# Patient Record
Sex: FEMALE | Race: BLACK OR AFRICAN AMERICAN | NOT HISPANIC OR LATINO | Employment: UNEMPLOYED | URBAN - METROPOLITAN AREA
[De-identification: names, ages, dates, MRNs, and addresses within clinical notes are randomized per-mention and may not be internally consistent; named-entity substitution may affect disease eponyms.]

---

## 2020-02-13 ENCOUNTER — HISTORICAL (OUTPATIENT)
Dept: ENDOSCOPY | Facility: HOSPITAL | Age: 75
End: 2020-02-13

## 2020-02-20 ENCOUNTER — HISTORICAL (OUTPATIENT)
Dept: ADMINISTRATIVE | Facility: HOSPITAL | Age: 75
End: 2020-02-20

## 2020-02-20 LAB
ABS NEUT (OLG): 5.57 X10(3)/MCL (ref 2.1–9.2)
ALBUMIN SERPL-MCNC: 3.2 GM/DL (ref 3.4–5)
ALBUMIN/GLOB SERPL: 0.8 {RATIO}
ALP SERPL-CCNC: 260 UNIT/L (ref 38–126)
ALT SERPL-CCNC: 422 UNIT/L (ref 12–78)
AST SERPL-CCNC: 224 UNIT/L (ref 15–37)
BASOPHILS # BLD AUTO: 0 X10(3)/MCL (ref 0–0.2)
BASOPHILS NFR BLD AUTO: 0 %
BILIRUB SERPL-MCNC: 15.2 MG/DL (ref 0.2–1)
BILIRUBIN DIRECT+TOT PNL SERPL-MCNC: 11.9 MG/DL (ref 0–0.2)
BILIRUBIN DIRECT+TOT PNL SERPL-MCNC: 3.3 MG/DL (ref 0–0.8)
BUN SERPL-MCNC: 9 MG/DL (ref 7–18)
CALCIUM SERPL-MCNC: 9.7 MG/DL (ref 8.5–10.1)
CANCER AG19-9 SERPL-ACNC: 9.6 IU/ML (ref 0–35)
CHLORIDE SERPL-SCNC: 104 MMOL/L (ref 98–107)
CO2 SERPL-SCNC: 27 MMOL/L (ref 21–32)
CREAT SERPL-MCNC: 0.73 MG/DL (ref 0.55–1.02)
EOSINOPHIL # BLD AUTO: 0 X10(3)/MCL (ref 0–0.9)
EOSINOPHIL NFR BLD AUTO: 0 %
ERYTHROCYTE [DISTWIDTH] IN BLOOD BY AUTOMATED COUNT: 17.4 % (ref 11.5–17)
GLOBULIN SER-MCNC: 4.2 GM/DL (ref 2.4–3.5)
GLUCOSE SERPL-MCNC: 172 MG/DL (ref 74–106)
HCT VFR BLD AUTO: 41.7 % (ref 37–47)
HGB BLD-MCNC: 13.1 GM/DL (ref 12–16)
INR PPP: 1.1 (ref 0–1.3)
LYMPHOCYTES # BLD AUTO: 2 X10(3)/MCL (ref 0.6–4.6)
LYMPHOCYTES NFR BLD AUTO: 24 %
MCH RBC QN AUTO: 27.3 PG (ref 27–31)
MCHC RBC AUTO-ENTMCNC: 31.4 GM/DL (ref 33–36)
MCV RBC AUTO: 86.9 FL (ref 80–94)
MONOCYTES # BLD AUTO: 0.4 X10(3)/MCL (ref 0.1–1.3)
MONOCYTES NFR BLD AUTO: 5 %
NEUTROPHILS # BLD AUTO: 5.57 X10(3)/MCL (ref 2.1–9.2)
NEUTROPHILS NFR BLD AUTO: 69 %
PLATELET # BLD AUTO: 278 X10(3)/MCL (ref 130–400)
PMV BLD AUTO: 10.3 FL (ref 9.4–12.4)
POTASSIUM SERPL-SCNC: 3.3 MMOL/L (ref 3.5–5.1)
PROT SERPL-MCNC: 7.4 GM/DL (ref 6.4–8.2)
PROTHROMBIN TIME: 13.9 SECOND(S) (ref 11.1–13.7)
RBC # BLD AUTO: 4.8 X10(6)/MCL (ref 4.2–5.4)
SODIUM SERPL-SCNC: 139 MMOL/L (ref 136–145)
WBC # SPEC AUTO: 8.1 X10(3)/MCL (ref 4.5–11.5)

## 2020-03-04 ENCOUNTER — HISTORICAL (OUTPATIENT)
Dept: RADIOLOGY | Facility: HOSPITAL | Age: 75
End: 2020-03-04

## 2020-03-05 ENCOUNTER — HISTORICAL (OUTPATIENT)
Dept: ADMINISTRATIVE | Facility: HOSPITAL | Age: 75
End: 2020-03-05

## 2020-03-05 LAB
ALBUMIN SERPL-MCNC: 2.9 GM/DL (ref 3.4–5)
ALBUMIN/GLOB SERPL: 0.7 RATIO (ref 1.1–2)
ALP SERPL-CCNC: 782 UNIT/L (ref 38–126)
ALT SERPL-CCNC: 763 UNIT/L (ref 12–78)
AST SERPL-CCNC: 521 UNIT/L (ref 15–37)
BILIRUB SERPL-MCNC: 2.8 MG/DL (ref 0.2–1)
BILIRUBIN DIRECT+TOT PNL SERPL-MCNC: 0.3 MG/DL (ref 0–0.8)
BILIRUBIN DIRECT+TOT PNL SERPL-MCNC: 2.5 MG/DL (ref 0–0.5)
BUN SERPL-MCNC: 15 MG/DL (ref 7–18)
CALCIUM SERPL-MCNC: 9 MG/DL (ref 8.5–10.1)
CHLORIDE SERPL-SCNC: 102 MMOL/L (ref 98–107)
CO2 SERPL-SCNC: 29 MMOL/L (ref 21–32)
CREAT SERPL-MCNC: 0.75 MG/DL (ref 0.55–1.02)
GLOBULIN SER-MCNC: 3.9 GM/DL (ref 2.4–3.5)
GLUCOSE SERPL-MCNC: 392 MG/DL (ref 74–106)
POTASSIUM SERPL-SCNC: 4.2 MMOL/L (ref 3.5–5.1)
PROT SERPL-MCNC: 6.8 GM/DL (ref 6.4–8.2)
SODIUM SERPL-SCNC: 139 MMOL/L (ref 136–145)

## 2020-03-06 ENCOUNTER — HISTORICAL (OUTPATIENT)
Dept: RADIOLOGY | Facility: HOSPITAL | Age: 75
End: 2020-03-06

## 2020-03-11 ENCOUNTER — HISTORICAL (OUTPATIENT)
Dept: ADMINISTRATIVE | Facility: HOSPITAL | Age: 75
End: 2020-03-11

## 2020-03-11 LAB
ABS NEUT (OLG): 3.12 X10(3)/MCL (ref 2.1–9.2)
ALBUMIN SERPL-MCNC: 2.8 GM/DL (ref 3.4–5)
ALBUMIN/GLOB SERPL: 0.7 RATIO (ref 1.1–2)
ALP SERPL-CCNC: 408 UNIT/L (ref 38–126)
ALT SERPL-CCNC: 197 UNIT/L (ref 12–78)
AST SERPL-CCNC: 52 UNIT/L (ref 15–37)
BASOPHILS # BLD AUTO: 0 X10(3)/MCL (ref 0–0.2)
BASOPHILS NFR BLD AUTO: 0.3 %
BILIRUB SERPL-MCNC: 1.8 MG/DL (ref 0.2–1)
BILIRUBIN DIRECT+TOT PNL SERPL-MCNC: 0.1 MG/DL (ref 0–0.8)
BILIRUBIN DIRECT+TOT PNL SERPL-MCNC: 1.7 MG/DL (ref 0–0.5)
BUN SERPL-MCNC: 11 MG/DL (ref 7–18)
CALCIUM SERPL-MCNC: 9 MG/DL (ref 8.5–10.1)
CHLORIDE SERPL-SCNC: 104 MMOL/L (ref 98–107)
CO2 SERPL-SCNC: 30 MMOL/L (ref 21–32)
CREAT SERPL-MCNC: 0.66 MG/DL (ref 0.55–1.02)
EOSINOPHIL # BLD AUTO: 0.1 X10(3)/MCL (ref 0–0.9)
EOSINOPHIL NFR BLD AUTO: 1 %
ERYTHROCYTE [DISTWIDTH] IN BLOOD BY AUTOMATED COUNT: 14.8 % (ref 11.5–17)
GLOBULIN SER-MCNC: 3.9 GM/DL (ref 2.4–3.5)
GLUCOSE SERPL-MCNC: 245 MG/DL (ref 74–106)
HCT VFR BLD AUTO: 36.7 % (ref 37–47)
HGB BLD-MCNC: 11.4 GM/DL (ref 12–16)
LYMPHOCYTES # BLD AUTO: 2.7 X10(3)/MCL (ref 0.6–4.6)
LYMPHOCYTES NFR BLD AUTO: 42.6 %
MCH RBC QN AUTO: 27.7 PG (ref 27–31)
MCHC RBC AUTO-ENTMCNC: 31.1 GM/DL (ref 33–36)
MCV RBC AUTO: 89.3 FL (ref 80–94)
MONOCYTES # BLD AUTO: 0.4 X10(3)/MCL (ref 0.1–1.3)
MONOCYTES NFR BLD AUTO: 6.4 %
NEUTROPHILS # BLD AUTO: 3.1 X10(3)/MCL (ref 2.1–9.2)
NEUTROPHILS NFR BLD AUTO: 49.5 %
PLATELET # BLD AUTO: 249 X10(3)/MCL (ref 130–400)
PMV BLD AUTO: 9.1 FL (ref 9.4–12.4)
POTASSIUM SERPL-SCNC: 3.7 MMOL/L (ref 3.5–5.1)
PROT SERPL-MCNC: 6.7 GM/DL (ref 6.4–8.2)
RBC # BLD AUTO: 4.11 X10(6)/MCL (ref 4.2–5.4)
SODIUM SERPL-SCNC: 139 MMOL/L (ref 136–145)
WBC # SPEC AUTO: 6.3 X10(3)/MCL (ref 4.5–11.5)

## 2020-03-12 ENCOUNTER — HISTORICAL (OUTPATIENT)
Dept: INFUSION THERAPY | Facility: HOSPITAL | Age: 75
End: 2020-03-12

## 2020-03-12 ENCOUNTER — HISTORICAL (OUTPATIENT)
Dept: ADMINISTRATIVE | Facility: HOSPITAL | Age: 75
End: 2020-03-12

## 2020-03-18 ENCOUNTER — HISTORICAL (OUTPATIENT)
Dept: ADMINISTRATIVE | Facility: HOSPITAL | Age: 75
End: 2020-03-18

## 2020-03-18 LAB
ABS NEUT (OLG): 10.74 X10(3)/MCL (ref 2.1–9.2)
ALBUMIN SERPL-MCNC: 3 GM/DL (ref 3.4–5)
ALBUMIN/GLOB SERPL: 0.8 RATIO (ref 1.1–2)
ALP SERPL-CCNC: 284 UNIT/L (ref 38–126)
ALT SERPL-CCNC: 116 UNIT/L (ref 12–78)
AST SERPL-CCNC: 27 UNIT/L (ref 15–37)
BASOPHILS # BLD AUTO: 0 X10(3)/MCL (ref 0–0.2)
BASOPHILS NFR BLD AUTO: 0.1 %
BILIRUB SERPL-MCNC: 1.2 MG/DL (ref 0.2–1)
BILIRUBIN DIRECT+TOT PNL SERPL-MCNC: 0.2 MG/DL (ref 0–0.8)
BILIRUBIN DIRECT+TOT PNL SERPL-MCNC: 1 MG/DL (ref 0–0.5)
BUN SERPL-MCNC: 10 MG/DL (ref 7–18)
CALCIUM SERPL-MCNC: 9.2 MG/DL (ref 8.5–10.1)
CHLORIDE SERPL-SCNC: 100 MMOL/L (ref 98–107)
CO2 SERPL-SCNC: 30 MMOL/L (ref 21–32)
CREAT SERPL-MCNC: 0.75 MG/DL (ref 0.55–1.02)
EOSINOPHIL # BLD AUTO: 0 X10(3)/MCL (ref 0–0.9)
EOSINOPHIL NFR BLD AUTO: 0.1 %
ERYTHROCYTE [DISTWIDTH] IN BLOOD BY AUTOMATED COUNT: 14.2 % (ref 11.5–17)
GLOBULIN SER-MCNC: 3.7 GM/DL (ref 2.4–3.5)
GLUCOSE SERPL-MCNC: 296 MG/DL (ref 74–106)
HCT VFR BLD AUTO: 36.8 % (ref 37–47)
HGB BLD-MCNC: 11.5 GM/DL (ref 12–16)
LYMPHOCYTES # BLD AUTO: 2.8 X10(3)/MCL (ref 0.6–4.6)
LYMPHOCYTES NFR BLD AUTO: 19.6 %
MCH RBC QN AUTO: 27.8 PG (ref 27–31)
MCHC RBC AUTO-ENTMCNC: 31.3 GM/DL (ref 33–36)
MCV RBC AUTO: 89.1 FL (ref 80–94)
MONOCYTES # BLD AUTO: 0.7 X10(3)/MCL (ref 0.1–1.3)
MONOCYTES NFR BLD AUTO: 5 %
NEUTROPHILS # BLD AUTO: 10.7 X10(3)/MCL (ref 2.1–9.2)
NEUTROPHILS NFR BLD AUTO: 74 %
PLATELET # BLD AUTO: 127 X10(3)/MCL (ref 130–400)
PMV BLD AUTO: 9.8 FL (ref 9.4–12.4)
POTASSIUM SERPL-SCNC: 4.1 MMOL/L (ref 3.5–5.1)
PROT SERPL-MCNC: 6.7 GM/DL (ref 6.4–8.2)
RBC # BLD AUTO: 4.13 X10(6)/MCL (ref 4.2–5.4)
SODIUM SERPL-SCNC: 137 MMOL/L (ref 136–145)
WBC # SPEC AUTO: 14.5 X10(3)/MCL (ref 4.5–11.5)

## 2020-03-26 ENCOUNTER — HISTORICAL (OUTPATIENT)
Dept: INFUSION THERAPY | Facility: HOSPITAL | Age: 75
End: 2020-03-26

## 2020-03-26 LAB
ABS NEUT (OLG): 13.15 X10(3)/MCL (ref 2.1–9.2)
ANION GAP SERPL CALC-SCNC: 17 MMOL/L
BASOPHILS # BLD AUTO: 0 X10(3)/MCL (ref 0–0.2)
BASOPHILS NFR BLD AUTO: 0.2 %
BUN SERPL-MCNC: 8 MG/DL (ref 8–26)
CHLORIDE SERPL-SCNC: 100 MMOL/L (ref 98–109)
CREAT SERPL-MCNC: 0.5 MG/DL (ref 0.6–1.3)
EOSINOPHIL # BLD AUTO: 0.2 X10(3)/MCL (ref 0–0.9)
EOSINOPHIL NFR BLD AUTO: 0.9 %
ERYTHROCYTE [DISTWIDTH] IN BLOOD BY AUTOMATED COUNT: 14.8 % (ref 11.5–17)
GLUCOSE SERPL-MCNC: 280 MG/DL (ref 70–105)
HCT VFR BLD AUTO: 37.3 % (ref 37–47)
HCT VFR BLD CALC: 37 % (ref 38–51)
HGB BLD-MCNC: 11.5 GM/DL (ref 12–16)
HGB BLD-MCNC: 12.6 MG/DL (ref 12–17)
LYMPHOCYTES # BLD AUTO: 4.3 X10(3)/MCL (ref 0.6–4.6)
LYMPHOCYTES NFR BLD AUTO: 23.2 %
MCH RBC QN AUTO: 27.3 PG (ref 27–31)
MCHC RBC AUTO-ENTMCNC: 30.8 GM/DL (ref 33–36)
MCV RBC AUTO: 88.6 FL (ref 80–94)
MONOCYTES # BLD AUTO: 0.8 X10(3)/MCL (ref 0.1–1.3)
MONOCYTES NFR BLD AUTO: 4.1 %
NEUTROPHILS # BLD AUTO: 13.2 X10(3)/MCL (ref 2.1–9.2)
NEUTROPHILS NFR BLD AUTO: 71 %
PLATELET # BLD AUTO: 181 X10(3)/MCL (ref 130–400)
PMV BLD AUTO: 9.1 FL (ref 9.4–12.4)
POC IONIZED CALCIUM: 1.19 MMOL/L (ref 1.12–1.32)
POC TCO2: 26 MMOL/L (ref 24–29)
POTASSIUM BLD-SCNC: 4 MMOL/L (ref 3.5–4.9)
RBC # BLD AUTO: 4.21 X10(6)/MCL (ref 4.2–5.4)
SODIUM BLD-SCNC: 138 MMOL/L (ref 138–146)
WBC # SPEC AUTO: 18.5 X10(3)/MCL (ref 4.5–11.5)

## 2020-03-27 ENCOUNTER — HISTORICAL (OUTPATIENT)
Dept: INFUSION THERAPY | Facility: HOSPITAL | Age: 75
End: 2020-03-27

## 2020-04-01 ENCOUNTER — HISTORICAL (OUTPATIENT)
Dept: ADMINISTRATIVE | Facility: HOSPITAL | Age: 75
End: 2020-04-01

## 2020-04-01 LAB
ABS NEUT (OLG): 27.55 X10(3)/MCL (ref 2.1–9.2)
ALBUMIN SERPL-MCNC: 2.9 GM/DL (ref 3.4–4.8)
ALBUMIN/GLOB SERPL: 0.9 RATIO (ref 1.1–2)
ALP SERPL-CCNC: 332 UNIT/L (ref 40–150)
ALT SERPL-CCNC: 101 UNIT/L (ref 0–55)
AST SERPL-CCNC: 53 UNIT/L (ref 5–34)
BASOPHILS # BLD AUTO: 0 X10(3)/MCL (ref 0–0.2)
BASOPHILS NFR BLD AUTO: 0 %
BILIRUB SERPL-MCNC: 2.6 MG/DL
BILIRUBIN DIRECT+TOT PNL SERPL-MCNC: 0.4 MG/DL (ref 0–0.8)
BILIRUBIN DIRECT+TOT PNL SERPL-MCNC: 2.2 MG/DL (ref 0–0.5)
BUN SERPL-MCNC: 12 MG/DL (ref 9.8–20.1)
CALCIUM SERPL-MCNC: 8.4 MG/DL (ref 8.4–10.2)
CHLORIDE SERPL-SCNC: 101 MMOL/L (ref 98–107)
CO2 SERPL-SCNC: 26 MMOL/L (ref 23–31)
CREAT SERPL-MCNC: 0.73 MG/DL (ref 0.55–1.02)
EOSINOPHIL # BLD AUTO: 0 X10(3)/MCL (ref 0–0.9)
EOSINOPHIL NFR BLD AUTO: 0.2 %
ERYTHROCYTE [DISTWIDTH] IN BLOOD BY AUTOMATED COUNT: 15 % (ref 11.5–17)
GLOBULIN SER-MCNC: 3.2 GM/DL (ref 2.4–3.5)
GLUCOSE SERPL-MCNC: 270 MG/DL (ref 82–115)
HCT VFR BLD AUTO: 35.9 % (ref 37–47)
HGB BLD-MCNC: 11.2 GM/DL (ref 12–16)
LYMPHOCYTES # BLD AUTO: 2.4 X10(3)/MCL (ref 0.6–4.6)
LYMPHOCYTES NFR BLD AUTO: 7.7 %
MCH RBC QN AUTO: 27.4 PG (ref 27–31)
MCHC RBC AUTO-ENTMCNC: 31.2 GM/DL (ref 33–36)
MCV RBC AUTO: 87.8 FL (ref 80–94)
MONOCYTES # BLD AUTO: 1.1 X10(3)/MCL (ref 0.1–1.3)
MONOCYTES NFR BLD AUTO: 3.5 %
NEUTROPHILS # BLD AUTO: 27.6 X10(3)/MCL (ref 2.1–9.2)
NEUTROPHILS NFR BLD AUTO: 87.2 %
PLATELET # BLD AUTO: 129 X10(3)/MCL (ref 130–400)
PMV BLD AUTO: 9.2 FL (ref 9.4–12.4)
POTASSIUM SERPL-SCNC: 3.4 MMOL/L (ref 3.5–5.1)
PROT SERPL-MCNC: 6.1 GM/DL (ref 5.8–7.6)
RBC # BLD AUTO: 4.09 X10(6)/MCL (ref 4.2–5.4)
SODIUM SERPL-SCNC: 136 MMOL/L (ref 136–145)
WBC # SPEC AUTO: 31.6 X10(3)/MCL (ref 4.5–11.5)

## 2020-04-09 ENCOUNTER — HISTORICAL (OUTPATIENT)
Dept: INFUSION THERAPY | Facility: HOSPITAL | Age: 75
End: 2020-04-09

## 2020-04-09 LAB
ABS NEUT (OLG): 18.19 X10(3)/MCL (ref 2.1–9.2)
ALBUMIN SERPL-MCNC: 3.2 GM/DL (ref 3.4–5)
ALP SERPL-CCNC: 354 UNIT/L (ref 40–150)
ALT SERPL-CCNC: 78 UNIT/L (ref 0–55)
ANION GAP SERPL CALC-SCNC: 14 MMOL/L
AST SERPL-CCNC: 46 UNIT/L (ref 5–34)
BASOPHILS # BLD AUTO: 0 X10(3)/MCL (ref 0–0.2)
BASOPHILS NFR BLD AUTO: 0.2 %
BILIRUB SERPL-MCNC: 1.1 MG/DL
BILIRUBIN DIRECT+TOT PNL SERPL-MCNC: 0.4 MG/DL (ref 0–0.8)
BILIRUBIN DIRECT+TOT PNL SERPL-MCNC: 0.7 MG/DL (ref 0–0.5)
BUN SERPL-MCNC: 8 MG/DL (ref 8–26)
CHLORIDE SERPL-SCNC: 102 MMOL/L (ref 98–109)
CREAT SERPL-MCNC: 0.6 MG/DL (ref 0.6–1.3)
EOSINOPHIL # BLD AUTO: 0.2 X10(3)/MCL (ref 0–0.9)
EOSINOPHIL NFR BLD AUTO: 0.6 %
ERYTHROCYTE [DISTWIDTH] IN BLOOD BY AUTOMATED COUNT: 16 % (ref 11.5–17)
GLUCOSE SERPL-MCNC: 229 MG/DL (ref 70–105)
HCT VFR BLD AUTO: 38.2 % (ref 37–47)
HCT VFR BLD CALC: 37 % (ref 38–51)
HGB BLD-MCNC: 11.8 GM/DL (ref 12–16)
HGB BLD-MCNC: 12.6 MG/DL (ref 12–17)
LIVER PROFILE INTERP: ABNORMAL
LYMPHOCYTES # BLD AUTO: 4.4 X10(3)/MCL (ref 0.6–4.6)
LYMPHOCYTES NFR BLD AUTO: 18.3 %
MCH RBC QN AUTO: 27.6 PG (ref 27–31)
MCHC RBC AUTO-ENTMCNC: 30.9 GM/DL (ref 33–36)
MCV RBC AUTO: 89.5 FL (ref 80–94)
MONOCYTES # BLD AUTO: 0.9 X10(3)/MCL (ref 0.1–1.3)
MONOCYTES NFR BLD AUTO: 3.7 %
NEUTROPHILS # BLD AUTO: 18.2 X10(3)/MCL (ref 2.1–9.2)
NEUTROPHILS NFR BLD AUTO: 76.6 %
PLATELET # BLD AUTO: 266 X10(3)/MCL (ref 130–400)
PMV BLD AUTO: 9.1 FL (ref 9.4–12.4)
POC IONIZED CALCIUM: 1.16 MMOL/L (ref 1.12–1.32)
POC TCO2: 27 MMOL/L (ref 24–29)
POTASSIUM BLD-SCNC: 3.7 MMOL/L (ref 3.5–4.9)
PROT SERPL-MCNC: 7 GM/DL (ref 5.8–7.6)
RBC # BLD AUTO: 4.27 X10(6)/MCL (ref 4.2–5.4)
SODIUM BLD-SCNC: 139 MMOL/L (ref 138–146)
WBC # SPEC AUTO: 23.8 X10(3)/MCL (ref 4.5–11.5)

## 2020-04-23 ENCOUNTER — HISTORICAL (OUTPATIENT)
Dept: ADMINISTRATIVE | Facility: HOSPITAL | Age: 75
End: 2020-04-23

## 2020-04-23 LAB
ABS NEUT (OLG): 8.93 X10(3)/MCL (ref 2.1–9.2)
ALBUMIN SERPL-MCNC: 2.7 GM/DL (ref 3.4–4.8)
ALP SERPL-CCNC: 337 UNIT/L (ref 40–150)
ALT SERPL-CCNC: 64 UNIT/L (ref 0–55)
ANION GAP SERPL CALC-SCNC: 16 MMOL/L
AST SERPL-CCNC: 56 UNIT/L (ref 5–34)
BASOPHILS # BLD AUTO: 0 X10(3)/MCL (ref 0–0.2)
BASOPHILS NFR BLD AUTO: 0.3 %
BILIRUB SERPL-MCNC: 1.8 MG/DL
BILIRUBIN DIRECT+TOT PNL SERPL-MCNC: 0.7 MG/DL (ref 0–0.8)
BILIRUBIN DIRECT+TOT PNL SERPL-MCNC: 1.1 MG/DL (ref 0–0.5)
BUN SERPL-MCNC: 9 MG/DL (ref 8–26)
CHLORIDE SERPL-SCNC: 99 MMOL/L (ref 98–109)
CREAT SERPL-MCNC: 0.5 MG/DL (ref 0.6–1.3)
EOSINOPHIL # BLD AUTO: 0 X10(3)/MCL (ref 0–0.9)
EOSINOPHIL NFR BLD AUTO: 0.4 %
ERYTHROCYTE [DISTWIDTH] IN BLOOD BY AUTOMATED COUNT: 16.9 % (ref 11.5–17)
GLUCOSE SERPL-MCNC: 195 MG/DL (ref 70–105)
HCT VFR BLD AUTO: 31.7 % (ref 37–47)
HCT VFR BLD CALC: 33 % (ref 38–51)
HGB BLD-MCNC: 11.2 MG/DL (ref 12–17)
HGB BLD-MCNC: 9.9 GM/DL (ref 12–16)
LIVER PROFILE INTERP: ABNORMAL
LYMPHOCYTES # BLD AUTO: 2.2 X10(3)/MCL (ref 0.6–4.6)
LYMPHOCYTES NFR BLD AUTO: 17.7 %
MCH RBC QN AUTO: 28.3 PG (ref 27–31)
MCHC RBC AUTO-ENTMCNC: 31.2 GM/DL (ref 33–36)
MCV RBC AUTO: 90.6 FL (ref 80–94)
MONOCYTES # BLD AUTO: 0.9 X10(3)/MCL (ref 0.1–1.3)
MONOCYTES NFR BLD AUTO: 7.5 %
NEUTROPHILS # BLD AUTO: 8.9 X10(3)/MCL (ref 2.1–9.2)
NEUTROPHILS NFR BLD AUTO: 73.8 %
PLATELET # BLD AUTO: 280 X10(3)/MCL (ref 130–400)
PMV BLD AUTO: 10.4 FL (ref 9.4–12.4)
POC IONIZED CALCIUM: 1.12 MMOL/L (ref 1.12–1.32)
POC TCO2: 26 MMOL/L (ref 24–29)
POTASSIUM BLD-SCNC: 3.4 MMOL/L (ref 3.5–4.9)
PROT SERPL-MCNC: 7.3 GM/DL (ref 5.8–7.6)
RBC # BLD AUTO: 3.5 X10(6)/MCL (ref 4.2–5.4)
SODIUM BLD-SCNC: 137 MMOL/L (ref 138–146)
WBC # SPEC AUTO: 12.1 X10(3)/MCL (ref 4.5–11.5)

## 2020-04-30 ENCOUNTER — HISTORICAL (OUTPATIENT)
Dept: INFUSION THERAPY | Facility: HOSPITAL | Age: 75
End: 2020-04-30

## 2020-04-30 LAB
ABS NEUT (OLG): 5.73 X10(3)/MCL (ref 2.1–9.2)
ALBUMIN SERPL-MCNC: 2.9 GM/DL (ref 3.4–5)
ALP SERPL-CCNC: 312 UNIT/L (ref 40–150)
ALT SERPL-CCNC: 41 UNIT/L (ref 0–55)
ANION GAP SERPL CALC-SCNC: 14 MMOL/L
AST SERPL-CCNC: 41 UNIT/L (ref 5–34)
BASOPHILS # BLD AUTO: 0.1 X10(3)/MCL (ref 0–0.2)
BASOPHILS NFR BLD AUTO: 1.1 %
BILIRUB SERPL-MCNC: 1 MG/DL
BILIRUBIN DIRECT+TOT PNL SERPL-MCNC: 0.3 MG/DL (ref 0–0.8)
BILIRUBIN DIRECT+TOT PNL SERPL-MCNC: 0.7 MG/DL (ref 0–0.5)
BUN SERPL-MCNC: 8 MG/DL (ref 8–26)
CHLORIDE SERPL-SCNC: 102 MMOL/L (ref 98–109)
CREAT SERPL-MCNC: 0.6 MG/DL (ref 0.6–1.3)
EOSINOPHIL # BLD AUTO: 0.1 X10(3)/MCL (ref 0–0.9)
EOSINOPHIL NFR BLD AUTO: 1.4 %
ERYTHROCYTE [DISTWIDTH] IN BLOOD BY AUTOMATED COUNT: 17.4 % (ref 11.5–17)
GLUCOSE SERPL-MCNC: 166 MG/DL (ref 70–105)
HCT VFR BLD AUTO: 31.6 % (ref 37–47)
HCT VFR BLD CALC: 33 % (ref 38–51)
HGB BLD-MCNC: 11.2 MG/DL (ref 12–17)
HGB BLD-MCNC: 9.8 GM/DL (ref 12–16)
LIVER PROFILE INTERP: ABNORMAL
LYMPHOCYTES # BLD AUTO: 2.2 X10(3)/MCL (ref 0.6–4.6)
LYMPHOCYTES NFR BLD AUTO: 25.5 %
MCH RBC QN AUTO: 28.6 PG (ref 27–31)
MCHC RBC AUTO-ENTMCNC: 31 GM/DL (ref 33–36)
MCV RBC AUTO: 92.1 FL (ref 80–94)
MONOCYTES # BLD AUTO: 0.6 X10(3)/MCL (ref 0.1–1.3)
MONOCYTES NFR BLD AUTO: 6.6 %
NEUTROPHILS # BLD AUTO: 5.7 X10(3)/MCL (ref 2.1–9.2)
NEUTROPHILS NFR BLD AUTO: 65.2 %
PLATELET # BLD AUTO: 384 X10(3)/MCL (ref 130–400)
PMV BLD AUTO: 8.9 FL (ref 9.4–12.4)
POC IONIZED CALCIUM: 1.22 MMOL/L (ref 1.12–1.32)
POC TCO2: 27 MMOL/L (ref 24–29)
POTASSIUM BLD-SCNC: 3.5 MMOL/L (ref 3.5–4.9)
PROT SERPL-MCNC: 7.5 GM/DL (ref 5.8–7.6)
RBC # BLD AUTO: 3.43 X10(6)/MCL (ref 4.2–5.4)
SODIUM BLD-SCNC: 139 MMOL/L (ref 138–146)
WBC # SPEC AUTO: 8.8 X10(3)/MCL (ref 4.5–11.5)

## 2020-05-07 ENCOUNTER — HISTORICAL (OUTPATIENT)
Dept: ADMINISTRATIVE | Facility: HOSPITAL | Age: 75
End: 2020-05-07

## 2020-05-07 LAB
ABS NEUT (OLG): 1.74 X10(3)/MCL (ref 2.1–9.2)
ALBUMIN SERPL-MCNC: 2.9 GM/DL (ref 3.4–5)
ALBUMIN/GLOB SERPL: 0.8 RATIO (ref 1.1–2)
ALP SERPL-CCNC: 231 UNIT/L (ref 40–150)
ALT SERPL-CCNC: 40 UNIT/L (ref 0–55)
AST SERPL-CCNC: 32 UNIT/L (ref 5–34)
BASOPHILS # BLD AUTO: 0 X10(3)/MCL (ref 0–0.2)
BASOPHILS NFR BLD AUTO: 0.9 %
BILIRUB SERPL-MCNC: 0.7 MG/DL
BILIRUBIN DIRECT+TOT PNL SERPL-MCNC: 0.2 MG/DL (ref 0–0.8)
BILIRUBIN DIRECT+TOT PNL SERPL-MCNC: 0.5 MG/DL (ref 0–0.5)
BUN SERPL-MCNC: 7 MG/DL (ref 9.8–20.1)
CALCIUM SERPL-MCNC: 8.4 MG/DL (ref 8.4–10.2)
CHLORIDE SERPL-SCNC: 103 MMOL/L (ref 98–107)
CO2 SERPL-SCNC: 28 MMOL/L (ref 23–31)
CREAT SERPL-MCNC: 0.66 MG/DL (ref 0.55–1.02)
EOSINOPHIL # BLD AUTO: 0 X10(3)/MCL (ref 0–0.9)
EOSINOPHIL NFR BLD AUTO: 0.9 %
ERYTHROCYTE [DISTWIDTH] IN BLOOD BY AUTOMATED COUNT: 17 % (ref 11.5–17)
GLOBULIN SER-MCNC: 3.6 GM/DL (ref 2.4–3.5)
GLUCOSE SERPL-MCNC: 161 MG/DL (ref 82–115)
HCT VFR BLD AUTO: 28.7 % (ref 37–47)
HGB BLD-MCNC: 8.8 GM/DL (ref 12–16)
LYMPHOCYTES # BLD AUTO: 2.1 X10(3)/MCL (ref 0.6–4.6)
LYMPHOCYTES NFR BLD AUTO: 48.8 %
MCH RBC QN AUTO: 28.9 PG (ref 27–31)
MCHC RBC AUTO-ENTMCNC: 30.7 GM/DL (ref 33–36)
MCV RBC AUTO: 94.1 FL (ref 80–94)
MONOCYTES # BLD AUTO: 0.4 X10(3)/MCL (ref 0.1–1.3)
MONOCYTES NFR BLD AUTO: 8.6 %
NEUTROPHILS # BLD AUTO: 1.7 X10(3)/MCL (ref 2.1–9.2)
NEUTROPHILS NFR BLD AUTO: 40.6 %
PLATELET # BLD AUTO: 133 X10(3)/MCL (ref 130–400)
PMV BLD AUTO: 9.2 FL (ref 9.4–12.4)
POTASSIUM SERPL-SCNC: 3.9 MMOL/L (ref 3.5–5.1)
PROT SERPL-MCNC: 6.5 GM/DL (ref 5.8–7.6)
RBC # BLD AUTO: 3.05 X10(6)/MCL (ref 4.2–5.4)
SODIUM SERPL-SCNC: 140 MMOL/L (ref 136–145)
WBC # SPEC AUTO: 4.3 X10(3)/MCL (ref 4.5–11.5)

## 2020-05-12 ENCOUNTER — HISTORICAL (OUTPATIENT)
Dept: WOUND CARE | Facility: HOSPITAL | Age: 75
End: 2020-05-12

## 2020-05-21 ENCOUNTER — HISTORICAL (OUTPATIENT)
Dept: ADMINISTRATIVE | Facility: HOSPITAL | Age: 75
End: 2020-05-21

## 2020-05-21 LAB
ABS NEUT (OLG): 5.2 X10(3)/MCL (ref 2.1–9.2)
ALBUMIN SERPL-MCNC: 3.2 GM/DL (ref 3.4–4.8)
ALP SERPL-CCNC: 314 UNIT/L (ref 40–150)
ALT SERPL-CCNC: 49 UNIT/L (ref 0–55)
ANION GAP SERPL CALC-SCNC: 17 MMOL/L
AST SERPL-CCNC: 43 UNIT/L (ref 5–34)
BASOPHILS # BLD AUTO: 0 X10(3)/MCL (ref 0–0.2)
BASOPHILS NFR BLD AUTO: 0.3 %
BILIRUB SERPL-MCNC: 0.6 MG/DL
BILIRUBIN DIRECT+TOT PNL SERPL-MCNC: 0.2 MG/DL (ref 0–0.8)
BILIRUBIN DIRECT+TOT PNL SERPL-MCNC: 0.4 MG/DL (ref 0–0.5)
BUN SERPL-MCNC: 6 MG/DL (ref 8–26)
CHLORIDE SERPL-SCNC: 102 MMOL/L (ref 98–109)
CREAT SERPL-MCNC: 0.4 MG/DL (ref 0.6–1.3)
EOSINOPHIL # BLD AUTO: 0 X10(3)/MCL (ref 0–0.9)
EOSINOPHIL NFR BLD AUTO: 0.6 %
ERYTHROCYTE [DISTWIDTH] IN BLOOD BY AUTOMATED COUNT: 16.8 % (ref 11.5–17)
GLUCOSE SERPL-MCNC: 164 MG/DL (ref 70–105)
HCT VFR BLD AUTO: 32.2 % (ref 37–47)
HCT VFR BLD CALC: 32 % (ref 38–51)
HGB BLD-MCNC: 10.9 MG/DL (ref 12–17)
HGB BLD-MCNC: 9.6 GM/DL (ref 12–16)
LIVER PROFILE INTERP: ABNORMAL
LYMPHOCYTES # BLD AUTO: 2.9 X10(3)/MCL (ref 0.6–4.6)
LYMPHOCYTES NFR BLD AUTO: 33.3 %
MCH RBC QN AUTO: 28.2 PG (ref 27–31)
MCHC RBC AUTO-ENTMCNC: 29.8 GM/DL (ref 33–36)
MCV RBC AUTO: 94.4 FL (ref 80–94)
MONOCYTES # BLD AUTO: 0.5 X10(3)/MCL (ref 0.1–1.3)
MONOCYTES NFR BLD AUTO: 6 %
NEUTROPHILS # BLD AUTO: 5.2 X10(3)/MCL (ref 2.1–9.2)
NEUTROPHILS NFR BLD AUTO: 59.6 %
PLATELET # BLD AUTO: 315 X10(3)/MCL (ref 130–400)
PMV BLD AUTO: 8.9 FL (ref 9.4–12.4)
POC IONIZED CALCIUM: 1.24 MMOL/L (ref 1.12–1.32)
POC TCO2: 27 MMOL/L (ref 24–29)
POTASSIUM BLD-SCNC: 3.7 MMOL/L (ref 3.5–4.9)
PROT SERPL-MCNC: 7.1 GM/DL (ref 5.8–7.6)
RBC # BLD AUTO: 3.41 X10(6)/MCL (ref 4.2–5.4)
SODIUM BLD-SCNC: 140 MMOL/L (ref 138–146)
WBC # SPEC AUTO: 8.7 X10(3)/MCL (ref 4.5–11.5)

## 2020-06-04 ENCOUNTER — HISTORICAL (OUTPATIENT)
Dept: ADMINISTRATIVE | Facility: HOSPITAL | Age: 75
End: 2020-06-04

## 2020-06-04 LAB
ABS NEUT (OLG): 9.28 X10(3)/MCL (ref 2.1–9.2)
ALBUMIN SERPL-MCNC: 3.2 GM/DL (ref 3.4–5)
ALBUMIN/GLOB SERPL: 0.8 RATIO (ref 1.1–2)
ALP SERPL-CCNC: 561 UNIT/L (ref 40–150)
ALT SERPL-CCNC: 157 UNIT/L (ref 0–55)
AST SERPL-CCNC: 164 UNIT/L (ref 5–34)
BASOPHILS # BLD AUTO: 0 X10(3)/MCL (ref 0–0.2)
BASOPHILS NFR BLD AUTO: 0.2 %
BILIRUB SERPL-MCNC: 2.3 MG/DL
BILIRUBIN DIRECT+TOT PNL SERPL-MCNC: 0.6 MG/DL (ref 0–0.8)
BILIRUBIN DIRECT+TOT PNL SERPL-MCNC: 1.7 MG/DL (ref 0–0.5)
BUN SERPL-MCNC: 7.4 MG/DL (ref 9.8–20.1)
CALCIUM SERPL-MCNC: 9.2 MG/DL (ref 8.4–10.2)
CANCER AG19-9 SERPL-ACNC: <2.06 UNIT/ML (ref 0–37)
CHLORIDE SERPL-SCNC: 102 MMOL/L (ref 98–107)
CO2 SERPL-SCNC: 30 MMOL/L (ref 23–31)
CREAT SERPL-MCNC: 0.59 MG/DL (ref 0.55–1.02)
EOSINOPHIL # BLD AUTO: 0 X10(3)/MCL (ref 0–0.9)
EOSINOPHIL NFR BLD AUTO: 0.2 %
ERYTHROCYTE [DISTWIDTH] IN BLOOD BY AUTOMATED COUNT: 16.2 % (ref 11.5–17)
GLOBULIN SER-MCNC: 4 GM/DL (ref 2.4–3.5)
GLUCOSE SERPL-MCNC: 188 MG/DL (ref 82–115)
HCT VFR BLD AUTO: 34.5 % (ref 37–47)
HGB BLD-MCNC: 10.5 GM/DL (ref 12–16)
LYMPHOCYTES # BLD AUTO: 1.8 X10(3)/MCL (ref 0.6–4.6)
LYMPHOCYTES NFR BLD AUTO: 15.2 %
MCH RBC QN AUTO: 28.8 PG (ref 27–31)
MCHC RBC AUTO-ENTMCNC: 30.4 GM/DL (ref 33–36)
MCV RBC AUTO: 94.5 FL (ref 80–94)
MONOCYTES # BLD AUTO: 0.6 X10(3)/MCL (ref 0.1–1.3)
MONOCYTES NFR BLD AUTO: 4.8 %
NEUTROPHILS # BLD AUTO: 9.3 X10(3)/MCL (ref 2.1–9.2)
NEUTROPHILS NFR BLD AUTO: 79.4 %
PLATELET # BLD AUTO: 277 X10(3)/MCL (ref 130–400)
PMV BLD AUTO: 9.3 FL (ref 9.4–12.4)
POTASSIUM SERPL-SCNC: 4 MMOL/L (ref 3.5–5.1)
PROT SERPL-MCNC: 7.2 GM/DL (ref 5.8–7.6)
RBC # BLD AUTO: 3.65 X10(6)/MCL (ref 4.2–5.4)
SODIUM SERPL-SCNC: 139 MMOL/L (ref 136–145)
WBC # SPEC AUTO: 11.7 X10(3)/MCL (ref 4.5–11.5)

## 2020-06-10 ENCOUNTER — HISTORICAL (OUTPATIENT)
Dept: HEMATOLOGY/ONCOLOGY | Facility: CLINIC | Age: 75
End: 2020-06-10

## 2020-06-10 LAB
ABS NEUT (OLG): 7.1 X10(3)/MCL (ref 2.1–9.2)
ALBUMIN SERPL-MCNC: 2.8 GM/DL (ref 3.4–5)
ALP SERPL-CCNC: 560 UNIT/L (ref 40–150)
ALT SERPL-CCNC: 174 UNIT/L (ref 0–55)
ANION GAP SERPL CALC-SCNC: 15 MMOL/L
AST SERPL-CCNC: 131 UNIT/L (ref 5–34)
BASOPHILS # BLD AUTO: 0 X10(3)/MCL (ref 0–0.2)
BASOPHILS NFR BLD AUTO: 0.2 %
BILIRUB SERPL-MCNC: 2.5 MG/DL
BILIRUBIN DIRECT+TOT PNL SERPL-MCNC: 0.9 MG/DL (ref 0–0.8)
BILIRUBIN DIRECT+TOT PNL SERPL-MCNC: 1.6 MG/DL (ref 0–0.5)
BUN SERPL-MCNC: 5 MG/DL (ref 8–26)
CHLORIDE SERPL-SCNC: 100 MMOL/L (ref 98–109)
CREAT SERPL-MCNC: 0.3 MG/DL (ref 0.6–1.3)
EOSINOPHIL # BLD AUTO: 0.1 X10(3)/MCL (ref 0–0.9)
EOSINOPHIL NFR BLD AUTO: 0.6 %
ERYTHROCYTE [DISTWIDTH] IN BLOOD BY AUTOMATED COUNT: 15.4 % (ref 11.5–17)
GLUCOSE SERPL-MCNC: 270 MG/DL (ref 70–105)
HCT VFR BLD AUTO: 32.2 % (ref 37–47)
HCT VFR BLD CALC: 33 % (ref 38–51)
HGB BLD-MCNC: 10 GM/DL (ref 12–16)
HGB BLD-MCNC: 11.2 MG/DL (ref 12–17)
LIVER PROFILE INTERP: ABNORMAL
LYMPHOCYTES # BLD AUTO: 2.4 X10(3)/MCL (ref 0.6–4.6)
LYMPHOCYTES NFR BLD AUTO: 23.5 %
MCH RBC QN AUTO: 28.8 PG (ref 27–31)
MCHC RBC AUTO-ENTMCNC: 31.1 GM/DL (ref 33–36)
MCV RBC AUTO: 92.8 FL (ref 80–94)
MONOCYTES # BLD AUTO: 0.5 X10(3)/MCL (ref 0.1–1.3)
MONOCYTES NFR BLD AUTO: 5.2 %
NEUTROPHILS # BLD AUTO: 7.1 X10(3)/MCL (ref 2.1–9.2)
NEUTROPHILS NFR BLD AUTO: 70.4 %
PLATELET # BLD AUTO: 290 X10(3)/MCL (ref 130–400)
PMV BLD AUTO: 9.2 FL (ref 9.4–12.4)
POC IONIZED CALCIUM: 1.14 MMOL/L (ref 1.12–1.32)
POC TCO2: 24 MMOL/L (ref 24–29)
POTASSIUM BLD-SCNC: 3.8 MMOL/L (ref 3.5–4.9)
PROT SERPL-MCNC: 7.4 GM/DL (ref 5.8–7.6)
RBC # BLD AUTO: 3.47 X10(6)/MCL (ref 4.2–5.4)
SODIUM BLD-SCNC: 134 MMOL/L (ref 138–146)
WBC # SPEC AUTO: 10.1 X10(3)/MCL (ref 4.5–11.5)

## 2020-06-15 ENCOUNTER — HISTORICAL (OUTPATIENT)
Dept: ADMINISTRATIVE | Facility: HOSPITAL | Age: 75
End: 2020-06-15

## 2020-06-15 LAB
ABS NEUT (OLG): 6.77 X10(3)/MCL (ref 2.1–9.2)
ALBUMIN SERPL-MCNC: 2.7 GM/DL (ref 3.4–4.8)
ALP SERPL-CCNC: 569 UNIT/L (ref 40–150)
ALT SERPL-CCNC: 176 UNIT/L (ref 0–55)
AST SERPL-CCNC: 117 UNIT/L (ref 5–34)
BASOPHILS # BLD AUTO: 0.04 X10(3)/MCL (ref 0–0.2)
BASOPHILS NFR BLD AUTO: 0.4 % (ref 0–1)
BILIRUB SERPL-MCNC: 2.9 MG/DL (ref 0.2–1.2)
BILIRUBIN DIRECT+TOT PNL SERPL-MCNC: 0.7 MG/DL (ref 0–0.8)
BILIRUBIN DIRECT+TOT PNL SERPL-MCNC: 2.2 MG/DL (ref 0–0.5)
BUN SERPL-MCNC: 6.2 MG/DL (ref 9.8–20.1)
CALCIUM SERPL-MCNC: 9.5 MG/DL (ref 8.4–10.2)
CHLORIDE SERPL-SCNC: 101 MMOL/L (ref 98–107)
CO2 SERPL-SCNC: 22 MMOL/L (ref 23–31)
CREAT SERPL-MCNC: 0.6 MG/DL (ref 0.57–1.11)
CREAT/UREA NIT SERPL: 10
EOSINOPHIL # BLD AUTO: 0.08 X10(3)/MCL (ref 0–0.9)
EOSINOPHIL NFR BLD AUTO: 0.8 % (ref 0–6.4)
ERYTHROCYTE [DISTWIDTH] IN BLOOD BY AUTOMATED COUNT: 14.9 % (ref 11.5–17)
GLUCOSE SERPL-MCNC: 217 MG/DL (ref 82–115)
HCT VFR BLD AUTO: 32.5 % (ref 37–47)
HGB BLD-MCNC: 10.3 GM/DL (ref 12–16)
IMM GRANULOCYTES # BLD AUTO: 0.03 10*3/UL (ref 0–0.02)
IMM GRANULOCYTES NFR BLD AUTO: 0.3 % (ref 0–0.43)
LYMPHOCYTES # BLD AUTO: 2.35 X10(3)/MCL (ref 0.6–4.6)
LYMPHOCYTES NFR BLD AUTO: 24.2 % (ref 16–44)
MCH RBC QN AUTO: 28.4 PG (ref 27–31)
MCHC RBC AUTO-ENTMCNC: 31.7 GM/DL (ref 33–36)
MCV RBC AUTO: 89.5 FL (ref 80–94)
MONOCYTES # BLD AUTO: 0.46 X10(3)/MCL (ref 0.1–1.3)
MONOCYTES NFR BLD AUTO: 4.7 % (ref 4–12.1)
NEUTROPHILS # BLD AUTO: 6.77 X10(3)/MCL (ref 2.1–9.2)
NEUTROPHILS NFR BLD AUTO: 69.6 % (ref 43–73)
NRBC BLD AUTO-RTO: 0 % (ref 0–0.2)
PLATELET # BLD AUTO: 321 X10(3)/MCL (ref 130–400)
PMV BLD AUTO: 9.4 FL (ref 7.4–10.4)
POTASSIUM SERPL-SCNC: 3.6 MMOL/L (ref 3.5–5.1)
PROT SERPL-MCNC: 7.4 GM/DL (ref 5.8–7.6)
RBC # BLD AUTO: 3.63 X10(6)/MCL (ref 4.2–5.4)
SODIUM SERPL-SCNC: 135 MMOL/L (ref 136–145)
WBC # SPEC AUTO: 9.7 X10(3)/MCL (ref 4.5–11.5)

## 2020-07-23 ENCOUNTER — TELEPHONE (OUTPATIENT)
Dept: HEMATOLOGY/ONCOLOGY | Facility: CLINIC | Age: 75
End: 2020-07-23

## 2020-07-23 NOTE — TELEPHONE ENCOUNTER
LM for pt to call me back regarding her second opinion request . Direct number left with daughter over phone.

## 2020-07-24 ENCOUNTER — TELEPHONE (OUTPATIENT)
Dept: HEMATOLOGY/ONCOLOGY | Facility: CLINIC | Age: 75
End: 2020-07-24

## 2020-07-24 NOTE — TELEPHONE ENCOUNTER
Pt and daughter Marichuy called me back this am and they were on their speaker phone.  Together we set up an appt to have a second opinion for pt's pancreatic cancer with DR. Onofre for 8/4/2020 at 1120 am at the [facility].  Pt requested date and time for appt as she is traveling from [facility] And only wants a morning appt.  She states she has been undergoing care and treatment at [facility] and had procedures at [facility]   I will obtain records from those facilities and have ready for her visit with us.  I explained my role as nurse navigator and they have my direct number to call back with any further questions or concerns.  I gave them appt address and travel directions. They are in agreement with this plan,.

## 2020-07-27 ENCOUNTER — DOCUMENTATION ONLY (OUTPATIENT)
Dept: HEMATOLOGY/ONCOLOGY | Facility: CLINIC | Age: 75
End: 2020-07-27

## 2020-07-27 NOTE — NURSING
Spoke with Rhea at [facility] regarding obtaining records for upcoming appt with Luz Onofre for 8/4/20 . [phone].  Rhea took my request over the phone and will fax records to [phone] today for this pt.   Oncology Navigation   Intake  Date Worked: 07/27/20     Treatment                  Acuity      Follow Up  No follow-ups on file.

## 2020-07-28 ENCOUNTER — DOCUMENTATION ONLY (OUTPATIENT)
Dept: HEMATOLOGY/ONCOLOGY | Facility: CLINIC | Age: 75
End: 2020-07-28

## 2020-07-28 ENCOUNTER — HISTORICAL (OUTPATIENT)
Dept: ADMINISTRATIVE | Facility: HOSPITAL | Age: 75
End: 2020-07-28

## 2020-07-28 LAB
ABS NEUT (OLG): 4.53 X10(3)/MCL (ref 2.1–9.2)
ALBUMIN SERPL-MCNC: 2.5 GM/DL (ref 3.4–5)
ALBUMIN/GLOB SERPL: 0.7 RATIO (ref 1.1–2)
ALP SERPL-CCNC: 365 UNIT/L (ref 40–150)
ALT SERPL-CCNC: 32 UNIT/L (ref 0–55)
AST SERPL-CCNC: 38 UNIT/L (ref 5–34)
BASOPHILS # BLD AUTO: 0 X10(3)/MCL (ref 0–0.2)
BASOPHILS NFR BLD AUTO: 0.5 %
BILIRUB SERPL-MCNC: 0.7 MG/DL
BILIRUBIN DIRECT+TOT PNL SERPL-MCNC: 0.1 MG/DL (ref 0–0.8)
BILIRUBIN DIRECT+TOT PNL SERPL-MCNC: 0.6 MG/DL (ref 0–0.5)
BUN SERPL-MCNC: 8.4 MG/DL (ref 9.8–20.1)
CALCIUM SERPL-MCNC: 8.4 MG/DL (ref 8.4–10.2)
CANCER AG19-9 SERPL-ACNC: <2.06 UNIT/ML (ref 0–37)
CEA SERPL-MCNC: 4.54 MG/ML (ref 0–3)
CHLORIDE SERPL-SCNC: 106 MMOL/L (ref 98–107)
CO2 SERPL-SCNC: 29 MMOL/L (ref 23–31)
CREAT SERPL-MCNC: 0.68 MG/DL (ref 0.55–1.02)
EOSINOPHIL # BLD AUTO: 0.1 X10(3)/MCL (ref 0–0.9)
EOSINOPHIL NFR BLD AUTO: 1 %
ERYTHROCYTE [DISTWIDTH] IN BLOOD BY AUTOMATED COUNT: 14.1 % (ref 11.5–17)
GLOBULIN SER-MCNC: 3.8 GM/DL (ref 2.4–3.5)
GLUCOSE SERPL-MCNC: 177 MG/DL (ref 82–115)
HCT VFR BLD AUTO: 34.8 % (ref 37–47)
HGB BLD-MCNC: 10.9 GM/DL (ref 12–16)
LYMPHOCYTES # BLD AUTO: 2.8 X10(3)/MCL (ref 0.6–4.6)
LYMPHOCYTES NFR BLD AUTO: 35.9 %
MCH RBC QN AUTO: 28 PG (ref 27–31)
MCHC RBC AUTO-ENTMCNC: 31.3 GM/DL (ref 33–36)
MCV RBC AUTO: 89.5 FL (ref 80–94)
MONOCYTES # BLD AUTO: 0.4 X10(3)/MCL (ref 0.1–1.3)
MONOCYTES NFR BLD AUTO: 5.2 %
NEUTROPHILS # BLD AUTO: 4.5 X10(3)/MCL (ref 2.1–9.2)
NEUTROPHILS NFR BLD AUTO: 57.1 %
PLATELET # BLD AUTO: 361 X10(3)/MCL (ref 130–400)
PMV BLD AUTO: 8.7 FL (ref 9.4–12.4)
POTASSIUM SERPL-SCNC: 3.6 MMOL/L (ref 3.5–5.1)
PROT SERPL-MCNC: 6.3 GM/DL (ref 5.8–7.6)
RBC # BLD AUTO: 3.89 X10(6)/MCL (ref 4.2–5.4)
SODIUM SERPL-SCNC: 138 MMOL/L (ref 136–145)
WBC # SPEC AUTO: 7.9 X10(3)/MCL (ref 4.5–11.5)

## 2020-07-28 NOTE — NURSING
Received medical records from [facility] and scanned into media tab of epic chart.   Oncology Navigation   Intake  Date Worked: 07/28/20     Treatment                  Acuity      Follow Up  No follow-ups on file.

## 2020-08-03 PROBLEM — C25.9: Status: ACTIVE | Noted: 2020-08-03

## 2020-08-03 PROBLEM — C77.2: Status: ACTIVE | Noted: 2020-08-03

## 2020-08-03 PROBLEM — R63.4 WEIGHT LOSS: Status: ACTIVE | Noted: 2020-08-03

## 2020-08-04 ENCOUNTER — TELEPHONE (OUTPATIENT)
Dept: HEMATOLOGY/ONCOLOGY | Facility: CLINIC | Age: 75
End: 2020-08-04

## 2020-08-04 ENCOUNTER — OFFICE VISIT (OUTPATIENT)
Dept: HEMATOLOGY/ONCOLOGY | Facility: CLINIC | Age: 75
End: 2020-08-04
Payer: MEDICARE

## 2020-08-04 ENCOUNTER — LAB VISIT (OUTPATIENT)
Dept: LAB | Facility: HOSPITAL | Age: 75
End: 2020-08-04
Attending: INTERNAL MEDICINE
Payer: MEDICARE

## 2020-08-04 VITALS
SYSTOLIC BLOOD PRESSURE: 112 MMHG | WEIGHT: 91.5 LBS | HEART RATE: 73 BPM | OXYGEN SATURATION: 99 % | HEIGHT: 61 IN | DIASTOLIC BLOOD PRESSURE: 66 MMHG | RESPIRATION RATE: 17 BRPM | TEMPERATURE: 98 F | BODY MASS INDEX: 17.27 KG/M2

## 2020-08-04 DIAGNOSIS — C77.2 PANCREATIC CARCINOMA METASTATIC TO INTRA-ABDOMINAL LYMPH NODE: ICD-10-CM

## 2020-08-04 DIAGNOSIS — R63.4 WEIGHT LOSS: ICD-10-CM

## 2020-08-04 DIAGNOSIS — C25.9 PANCREATIC CARCINOMA METASTATIC TO INTRA-ABDOMINAL LYMPH NODE: ICD-10-CM

## 2020-08-04 LAB
ALBUMIN SERPL BCP-MCNC: 2.4 G/DL (ref 3.5–5.2)
ALP SERPL-CCNC: 274 U/L (ref 55–135)
ALT SERPL W/O P-5'-P-CCNC: 42 U/L (ref 10–44)
ANION GAP SERPL CALC-SCNC: 11 MMOL/L (ref 8–16)
AST SERPL-CCNC: 50 U/L (ref 10–40)
BASOPHILS # BLD AUTO: 0.03 K/UL (ref 0–0.2)
BASOPHILS NFR BLD: 0.4 % (ref 0–1.9)
BILIRUB SERPL-MCNC: 0.5 MG/DL (ref 0.1–1)
BUN SERPL-MCNC: 10 MG/DL (ref 8–23)
CALCIUM SERPL-MCNC: 8.5 MG/DL (ref 8.7–10.5)
CHLORIDE SERPL-SCNC: 104 MMOL/L (ref 95–110)
CO2 SERPL-SCNC: 23 MMOL/L (ref 23–29)
CREAT SERPL-MCNC: 0.6 MG/DL (ref 0.5–1.4)
DIFFERENTIAL METHOD: ABNORMAL
EOSINOPHIL # BLD AUTO: 0.1 K/UL (ref 0–0.5)
EOSINOPHIL NFR BLD: 0.8 % (ref 0–8)
ERYTHROCYTE [DISTWIDTH] IN BLOOD BY AUTOMATED COUNT: 14 % (ref 11.5–14.5)
EST. GFR  (AFRICAN AMERICAN): >60 ML/MIN/1.73 M^2
EST. GFR  (NON AFRICAN AMERICAN): >60 ML/MIN/1.73 M^2
GLUCOSE SERPL-MCNC: 192 MG/DL (ref 70–110)
HCT VFR BLD AUTO: 34.3 % (ref 37–48.5)
HGB BLD-MCNC: 10.9 G/DL (ref 12–16)
IMM GRANULOCYTES # BLD AUTO: 0.02 K/UL (ref 0–0.04)
IMM GRANULOCYTES NFR BLD AUTO: 0.3 % (ref 0–0.5)
LYMPHOCYTES # BLD AUTO: 2.6 K/UL (ref 1–4.8)
LYMPHOCYTES NFR BLD: 35.9 % (ref 18–48)
MCH RBC QN AUTO: 27.8 PG (ref 27–31)
MCHC RBC AUTO-ENTMCNC: 31.8 G/DL (ref 32–36)
MCV RBC AUTO: 88 FL (ref 82–98)
MONOCYTES # BLD AUTO: 0.3 K/UL (ref 0.3–1)
MONOCYTES NFR BLD: 4.1 % (ref 4–15)
NEUTROPHILS # BLD AUTO: 4.2 K/UL (ref 1.8–7.7)
NEUTROPHILS NFR BLD: 58.5 % (ref 38–73)
NRBC BLD-RTO: 0 /100 WBC
PLATELET # BLD AUTO: 224 K/UL (ref 150–350)
PMV BLD AUTO: 9.5 FL (ref 9.2–12.9)
POTASSIUM SERPL-SCNC: 3 MMOL/L (ref 3.5–5.1)
PROT SERPL-MCNC: 6.4 G/DL (ref 6–8.4)
RBC # BLD AUTO: 3.92 M/UL (ref 4–5.4)
SODIUM SERPL-SCNC: 138 MMOL/L (ref 136–145)
WBC # BLD AUTO: 7.24 K/UL (ref 3.9–12.7)

## 2020-08-04 PROCEDURE — 99999 PR PBB SHADOW E&M-EST. PATIENT-LVL IV: CPT | Mod: PBBFAC,,, | Performed by: INTERNAL MEDICINE

## 2020-08-04 PROCEDURE — 85025 COMPLETE CBC W/AUTO DIFF WBC: CPT

## 2020-08-04 PROCEDURE — 36415 COLL VENOUS BLD VENIPUNCTURE: CPT

## 2020-08-04 PROCEDURE — 99205 PR OFFICE/OUTPT VISIT, NEW, LEVL V, 60-74 MIN: ICD-10-PCS | Mod: S$PBB,,, | Performed by: INTERNAL MEDICINE

## 2020-08-04 PROCEDURE — 99214 OFFICE O/P EST MOD 30 MIN: CPT | Mod: PBBFAC | Performed by: INTERNAL MEDICINE

## 2020-08-04 PROCEDURE — 99999 PR PBB SHADOW E&M-EST. PATIENT-LVL IV: ICD-10-PCS | Mod: PBBFAC,,, | Performed by: INTERNAL MEDICINE

## 2020-08-04 PROCEDURE — 99205 OFFICE O/P NEW HI 60 MIN: CPT | Mod: S$PBB,,, | Performed by: INTERNAL MEDICINE

## 2020-08-04 PROCEDURE — 80053 COMPREHEN METABOLIC PANEL: CPT

## 2020-08-04 PROCEDURE — 86301 IMMUNOASSAY TUMOR CA 19-9: CPT

## 2020-08-04 RX ORDER — SIMVASTATIN 20 MG/1
20 TABLET, FILM COATED ORAL NIGHTLY
COMMUNITY
Start: 2020-02-13

## 2020-08-04 RX ORDER — MEGESTROL ACETATE 40 MG/ML
20 SUSPENSION ORAL DAILY
COMMUNITY
Start: 2020-06-04

## 2020-08-04 RX ORDER — HYDROCODONE BITARTRATE AND ACETAMINOPHEN 10; 325 MG/1; MG/1
10-325 TABLET ORAL EVERY 6 HOURS PRN
COMMUNITY
Start: 2020-07-06 | End: 2020-08-18 | Stop reason: SDUPTHER

## 2020-08-04 RX ORDER — PROMETHAZINE HYDROCHLORIDE 25 MG/1
25 TABLET ORAL EVERY 6 HOURS PRN
COMMUNITY
Start: 2020-07-20

## 2020-08-04 RX ORDER — LEVOTHYROXINE SODIUM 13 UG/1
150 CAPSULE ORAL DAILY
COMMUNITY
Start: 2020-06-20

## 2020-08-04 RX ORDER — PANCRELIPASE 30000; 6000; 19000 [USP'U]/1; [USP'U]/1; [USP'U]/1
1 CAPSULE, DELAYED RELEASE PELLETS ORAL
Qty: 90 CAPSULE | Refills: 11 | Status: SHIPPED | OUTPATIENT
Start: 2020-08-04 | End: 2021-08-04

## 2020-08-04 RX ORDER — GLIPIZIDE 10 MG/1
10 TABLET, FILM COATED, EXTENDED RELEASE ORAL 2 TIMES DAILY
COMMUNITY
Start: 2020-02-13

## 2020-08-04 RX ORDER — ONDANSETRON HYDROCHLORIDE 8 MG/1
8 TABLET, FILM COATED ORAL EVERY 8 HOURS PRN
COMMUNITY
Start: 2020-03-11

## 2020-08-04 RX ORDER — AMLODIPINE BESYLATE 5 MG/1
5 TABLET ORAL 2 TIMES DAILY
COMMUNITY
Start: 2020-02-13

## 2020-08-04 NOTE — ASSESSMENT & PLAN NOTE
Related to debility from cancer.  Was prescribed Megace however claims not to be very helpful.  Given her low ECOG performance score, will recommend discontinuing Megace due to increased risk for thrombosis.  Will introduce Creon supplement.  Also recommend small frequent meals.  May consider Marinol in future if no improvement with appetite and weight gain.

## 2020-08-04 NOTE — PLAN OF CARE
START ON PATHWAY REGIMEN - Pancreatic Adenocarcinoma    PANOS10        Gemcitabine (Gemzar)     **Always confirm dose/schedule in your pharmacy ordering system**    Patient Characteristics:  Metastatic Disease, First Line, PS  ?  2, BRCA1/2 and PALB2 Mutation   Absent/Unknown  Current evidence of distant metastases<= Yes  AJCC T Category: T4  AJCC N Category: N1  AJCC M Category: M1  AJCC 8 Stage Grouping: IV  Line of Therapy: First Line  ECOG Performance Status: 2  BRCA1/2 Mutation Status: Awaiting Test Results  PALB2 Mutation Status: Awaiting Test Results  Intent of Therapy:  Non-Curative / Palliative Intent, Discussed with Patient

## 2020-08-04 NOTE — TELEPHONE ENCOUNTER
Spoke with daughter, Marichuy over the phone. Reviewed my role as the nurse navigator and gave her my direct contact information. We reviewed and scheduled the following appts needed prior to starting chemotherapy.  Virtual New Patient teaching appt with the NP on 8/6/20 @ 1030am, Marichuy states she will do the precheck prior to the visit; Marichuy agreeable to pt having echo [facility] 1 & PET CT (cancer center on 8/14 @ 1015 and 1245, respectively. Agrees with pt having rapid covid test, lab appt, appt with Dr. Rutherford since Dr. Onofre will not be in clinic that day and chemotherapy on 8/18 starting @ 12noon at the [place] location. Informed Marichuy that pt can have 1 support person over 17 y/o with her for the visit, she's agreeable. Notified , social workers & pharamacist of pt intent to start chemotherapy tx along with date, time & location. Also notified referral center to process authorization for insurance approval.

## 2020-08-04 NOTE — PROGRESS NOTES
Subjective:   Date of Visit: 8/4/20   ?   ?    REFERRING PROVIDER: Aaareferral Self  No address on file   ?   CHIEF COMPLAINT:  2nd opinion???????   ?   ONCOLOGIC DIAGNOSIS:   Metastatic adenocarcinoma of the pancreas  ?   CURRENT TREATMENT:  None    PAST TREATMENT:  Chemotherapy and palliative biliary bypass  ?   ONCOLOGIC HISTORY:     -borderline resectable pancreatic adenocarcinoma-02/21/2020  -completed 5 cycles of neoadjuvant chemotherapy with gemcitabine and Abraxane  -plan for Whipple procedure however aborted due to evidence of metastatic disease, receive palliative biliary bypass through gastrojejunostomy.    HPI:  75-year-old female with diagnosis of borderline resectable pancreatic adenocarcinoma-moderately differentiated-T3 NX MX, status post neoadjuvant chemotherapy with gemcitabine and Abraxane proceeded for Whipple procedure which was aborted due to evidence of metastatic disease.  She underwent palliative biliary by prs through jejunostomy.  Interestingly the course of chemotherapy was interrupted on multiple occasions due to infections requiring hospitalizations.    She presents today to our clinic for 2nd opinion.  Continues to complain of weight loss due to decreased appetite and claims that magace has not been very helpful.  She also complains of mid upper back pain that only relieves with narcotics.  She denies nausea or vomiting, chest pain, diarrhea or dysuria.  Denies abnormal bleed.    Review of Systems   Constitutional: Positive for activity change, appetite change and fatigue.   Musculoskeletal: Positive for back pain.   Neurological: Positive for weakness.       ?   PAST MEDICAL HISTORY:   No past medical history on file. ?     PAST SURGICAL HISTORY:   No past surgical history on file.   ?   ALLERGIES:   Allergies as of 08/04/2020    (No Known Allergies)      ?   MEDICATIONS:?   Outpatient Medications Marked as Taking for the 8/4/20 encounter (Office Visit) with Justin Onofre MD    Medication Sig Dispense Refill    amLODIPine (NORVASC) 5 MG tablet Take 5 mg by mouth 2 (two) times a day.      glipiZIDE (GLUCOTROL) 10 MG TR24 Take 10 mg by mouth 2 (two) times a day.      HYDROcodone-acetaminophen (NORCO)  mg per tablet Take  tablets by mouth every 6 (six) hours as needed.      levothyroxine 150 mcg Cap Take 150 mcg by mouth once daily.      megestroL (MEGACE) 400 mg/10 mL (40 mg/mL) Susp Take 20 mg by mouth once daily.      ondansetron (ZOFRAN) 8 MG tablet Take 8 mg by mouth every 8 (eight) hours as needed.      promethazine (PHENERGAN) 25 MG tablet Take 25 mg by mouth every 6 (six) hours as needed.      simvastatin (ZOCOR) 20 MG tablet Take 20 mg by mouth every evening.        ?   SOCIAL HISTORY:?   Social History     Tobacco Use    Smoking status: Not on file   Substance Use Topics    Alcohol use: Not on file        ?   FAMILY HISTORY:   family history is not on file.   ?     Objective:      Physical Exam  Constitutional:       General: She is not in acute distress.     Appearance: She is well-developed. She is ill-appearing. She is not toxic-appearing.   HENT:      Head: Normocephalic and atraumatic.      Mouth/Throat:      Pharynx: No oropharyngeal exudate.   Eyes:      General: No scleral icterus.        Right eye: No discharge.         Left eye: No discharge.      Conjunctiva/sclera: Conjunctivae normal.      Pupils: Pupils are equal, round, and reactive to light.   Neck:      Musculoskeletal: Normal range of motion and neck supple.      Thyroid: No thyromegaly.   Cardiovascular:      Rate and Rhythm: Normal rate and regular rhythm.      Heart sounds: No murmur.   Pulmonary:      Effort: Pulmonary effort is normal. No respiratory distress.      Breath sounds: Normal breath sounds.   Chest:      Chest wall: No tenderness.   Abdominal:      General: Bowel sounds are normal. There is no distension.      Palpations: Abdomen is soft. There is no mass.      Tenderness:  There is no abdominal tenderness. There is no guarding or rebound.   Musculoskeletal: Normal range of motion.         General: No tenderness.   Lymphadenopathy:      Cervical: No cervical adenopathy.      Right cervical: No superficial cervical adenopathy.     Left cervical: No superficial cervical adenopathy.      Upper Body:      Right upper body: No supraclavicular or pectoral adenopathy.      Left upper body: No supraclavicular or pectoral adenopathy.   Skin:     General: Skin is warm and dry.      Capillary Refill: Capillary refill takes 2 to 3 seconds.      Coloration: Skin is not pale.      Findings: No erythema or rash.   Neurological:      Mental Status: She is alert and oriented to person, place, and time.      Cranial Nerves: No cranial nerve deficit.      Sensory: No sensory deficit.   Psychiatric:         Behavior: Behavior normal. Behavior is cooperative.         Judgment: Judgment normal.         ?   Vitals:    08/04/20 1129   BP: 112/66   Pulse: 73   Resp: 17   Temp: 98.1 °F (36.7 °C)      ?     ECOG SCORE    2 - Capable of all selfcare but unable to carry out any work activities, active > 50% of hours         ?   Laboratory:  ?   Lab Visit on 08/04/2020   Component Date Value Ref Range Status    WBC 08/04/2020 7.24  3.90 - 12.70 K/uL Final    RBC 08/04/2020 3.92* 4.00 - 5.40 M/uL Final    Hemoglobin 08/04/2020 10.9* 12.0 - 16.0 g/dL Final    Hematocrit 08/04/2020 34.3* 37.0 - 48.5 % Final    Mean Corpuscular Volume 08/04/2020 88  82 - 98 fL Final    Mean Corpuscular Hemoglobin 08/04/2020 27.8  27.0 - 31.0 pg Final    Mean Corpuscular Hemoglobin Conc 08/04/2020 31.8* 32.0 - 36.0 g/dL Final    RDW 08/04/2020 14.0  11.5 - 14.5 % Final    Platelets 08/04/2020 224  150 - 350 K/uL Final    MPV 08/04/2020 9.5  9.2 - 12.9 fL Final    Immature Granulocytes 08/04/2020 0.3  0.0 - 0.5 % Final    Gran # (ANC) 08/04/2020 4.2  1.8 - 7.7 K/uL Final    Immature Grans (Abs) 08/04/2020 0.02  0.00 - 0.04  K/uL Final    Lymph # 08/04/2020 2.6  1.0 - 4.8 K/uL Final    Mono # 08/04/2020 0.3  0.3 - 1.0 K/uL Final    Eos # 08/04/2020 0.1  0.0 - 0.5 K/uL Final    Baso # 08/04/2020 0.03  0.00 - 0.20 K/uL Final    nRBC 08/04/2020 0  0 /100 WBC Final    Gran% 08/04/2020 58.5  38.0 - 73.0 % Final    Lymph% 08/04/2020 35.9  18.0 - 48.0 % Final    Mono% 08/04/2020 4.1  4.0 - 15.0 % Final    Eosinophil% 08/04/2020 0.8  0.0 - 8.0 % Final    Basophil% 08/04/2020 0.4  0.0 - 1.9 % Final    Differential Method 08/04/2020 Automated   Final    Sodium 08/04/2020 138  136 - 145 mmol/L Final    Potassium 08/04/2020 3.0* 3.5 - 5.1 mmol/L Final    Chloride 08/04/2020 104  95 - 110 mmol/L Final    CO2 08/04/2020 23  23 - 29 mmol/L Final    Glucose 08/04/2020 192* 70 - 110 mg/dL Final    BUN, Bld 08/04/2020 10  8 - 23 mg/dL Final    Creatinine 08/04/2020 0.6  0.5 - 1.4 mg/dL Final    Calcium 08/04/2020 8.5* 8.7 - 10.5 mg/dL Final    Total Protein 08/04/2020 6.4  6.0 - 8.4 g/dL Final    Albumin 08/04/2020 2.4* 3.5 - 5.2 g/dL Final    Total Bilirubin 08/04/2020 0.5  0.1 - 1.0 mg/dL Final    Alkaline Phosphatase 08/04/2020 274* 55 - 135 U/L Final    AST 08/04/2020 50* 10 - 40 U/L Final    ALT 08/04/2020 42  10 - 44 U/L Final    Anion Gap 08/04/2020 11  8 - 16 mmol/L Final    eGFR if African American 08/04/2020 >60  >60 mL/min/1.73 m^2 Final    eGFR if non African American 08/04/2020 >60  >60 mL/min/1.73 m^2 Final      ?   Tumor markers   ?   ?   Imaging: No image results found.     ?      Pathology:  Pathology Results  (Last 10 years)    None           ?   Assessment/Plan:       1. 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   Presents today for 2nd opinion.  Will start of by restaging with PET scan and biochemically with CA 19 9.  Will also obtain CBC and CMP.    Will request for tissue from outside facility to sent for genetic testing.  Plan to test for MSI status, BRCA and NTRK fusion gene mutation.  Meanwhile will initiate palliative systemic treatment with gemcitabine monotherapy.  Discussed the prognosis of metastatic pancreatic adenocarcinoma with patient and daughter, noted that this disease cannot be cured but can be treated.  She is to return back following nurse practitioner chemo teaching and 2D echocardiogram in about 2 weeks.  She knows to contact us sooner if needed.    Weight loss  Related to debility from cancer.  Was prescribed Megace however claims not to be very helpful.  Given her low ECOG performance score, will recommend discontinuing Megace due to increased risk for thrombosis.  Will introduce Creon supplement.  Also recommend small frequent meals.  May consider Marinol in future if no improvement with appetite and weight gain.     ?   ?   Follow-Up: Follow up in about 2 weeks (around 8/18/2020).    MAYURI JOHNSON Md., Ph.D  Hematology & Oncology Department  Phone #: [phone]

## 2020-08-04 NOTE — ASSESSMENT & PLAN NOTE
Initially diagnosed as borderline resectable pancreatic adenocarcinoma, status post neoadjuvant chemotherapy however Whipple's procedure aborted due to evidence of metastatic disease.  Presents today for 2nd opinion.  Will start of by restaging with PET scan and biochemically with CA 19 9.  Will also obtain CBC and CMP.    Will request for tissue from outside facility to sent for genetic testing.  Plan to test for MSI status, BRCA and NTRK fusion gene mutation.  Meanwhile will initiate palliative systemic treatment with gemcitabine monotherapy.  Discussed the prognosis of metastatic pancreatic adenocarcinoma with patient and daughter, noted that this disease cannot be cured but can be treated.  She is to return back following nurse practitioner chemo teaching and 2D echocardiogram in about 2 weeks.  She knows to contact us sooner if needed.

## 2020-08-04 NOTE — TELEPHONE ENCOUNTER
LVM on both home & cell phone to call me at my direct number to discuss some appts per Dr. Onofre's request. Will follow

## 2020-08-05 ENCOUNTER — DOCUMENTATION ONLY (OUTPATIENT)
Dept: PHARMACY | Facility: HOSPITAL | Age: 75
End: 2020-08-05

## 2020-08-05 LAB — CANCER AG19-9 SERPL-ACNC: <2 U/ML (ref 2–40)

## 2020-08-06 ENCOUNTER — OFFICE VISIT (OUTPATIENT)
Dept: HEMATOLOGY/ONCOLOGY | Facility: CLINIC | Age: 75
End: 2020-08-06
Payer: MEDICARE

## 2020-08-06 DIAGNOSIS — C77.2 PANCREATIC CARCINOMA METASTATIC TO INTRA-ABDOMINAL LYMPH NODE: Primary | ICD-10-CM

## 2020-08-06 DIAGNOSIS — C25.9 PANCREATIC CARCINOMA METASTATIC TO INTRA-ABDOMINAL LYMPH NODE: Primary | ICD-10-CM

## 2020-08-06 PROCEDURE — 99214 PR OFFICE/OUTPT VISIT, EST, LEVL IV, 30-39 MIN: ICD-10-PCS | Mod: 95,,, | Performed by: NURSE PRACTITIONER

## 2020-08-06 PROCEDURE — 99214 OFFICE O/P EST MOD 30 MIN: CPT | Mod: 95,,, | Performed by: NURSE PRACTITIONER

## 2020-08-06 RX ORDER — ONDANSETRON 8 MG/1
8 TABLET, ORALLY DISINTEGRATING ORAL EVERY 12 HOURS PRN
Qty: 30 TABLET | Refills: 1 | Status: SHIPPED | OUTPATIENT
Start: 2020-08-06 | End: 2021-08-06

## 2020-08-06 NOTE — PROGRESS NOTES
76 y/o female who presents to the Heme-Onc Clinic today for chemotherapy teaching.  Patient given the Navigation Notebook.  I had a detailed discussion with patient in regards to how to use notebook.  I had a detailed discussion with patient regarding the rationale for chemotherapy, how the chemotherapy works, the process of treatment, potential side effects along with managing the potential side effects.  Also discussed with patient signs and symptoms to report.     I had a detailed discussion with the patient and reviewed the specific medication(s) and gave them a handout describing the potential side effects of GEMZAR along with the management of those potential side effects. Also discussed with patient signs and symptoms to report.     Discussed in detail potential side effects such as:  Nausea and vomiting  Bone Marrow Suppression  Thrombocytopenia precautions  Anemia  Leukopenia  Fatigue  Anorexia  Diarrhea  Cystitis  Gastritis  Fever > 100.4  Antiemetics instructions  Skin care  Constipation  Rash  Small frequent meals  Increased protein  Increased calories  Vitamin support   Taste alterations  Increased Hydration  Renal toxicity   DVTs  Stress   Depression   Port management  Peripheral IV   Community resources  Importance of monitoring blood sugars if diabetic and reporting elevations or lows    40 minutes total Viteral face to face time spent with patient completing chemotherapy education. 30 minutes spent educating patient about new medication. 5 minutes spent addressing questions and concerns. 5 minutes spent discussed advanced care planning    Advance Care Planning   Today I introduced the topic of advance care planning in regards to advanced directives. Patient/family interested in information regarding advanced care planning

## 2020-08-06 NOTE — ASSESSMENT & PLAN NOTE
Chemotherapy education performed. Patient knows to keep all follow up appointments as previously scheduled.

## 2020-08-11 ENCOUNTER — HISTORICAL (OUTPATIENT)
Dept: INFUSION THERAPY | Facility: HOSPITAL | Age: 75
End: 2020-08-11

## 2020-08-11 ENCOUNTER — TELEPHONE (OUTPATIENT)
Dept: RADIOLOGY | Facility: HOSPITAL | Age: 75
End: 2020-08-11

## 2020-08-12 ENCOUNTER — TELEPHONE (OUTPATIENT)
Dept: RADIOLOGY | Facility: HOSPITAL | Age: 75
End: 2020-08-12

## 2020-08-14 ENCOUNTER — HOSPITAL ENCOUNTER (OUTPATIENT)
Dept: RADIOLOGY | Facility: HOSPITAL | Age: 75
Discharge: HOME | End: 2020-08-14
Attending: INTERNAL MEDICINE
Payer: MEDICARE

## 2020-08-14 ENCOUNTER — HOSPITAL ENCOUNTER (OUTPATIENT)
Dept: CARDIOLOGY | Facility: HOSPITAL | Age: 75
Discharge: HOME | End: 2020-08-14
Attending: INTERNAL MEDICINE
Payer: MEDICARE

## 2020-08-14 VITALS — WEIGHT: 91 LBS | HEART RATE: 58 BPM | BODY MASS INDEX: 17.18 KG/M2 | HEIGHT: 61 IN

## 2020-08-14 DIAGNOSIS — C77.2 PANCREATIC CARCINOMA METASTATIC TO INTRA-ABDOMINAL LYMPH NODE: ICD-10-CM

## 2020-08-14 DIAGNOSIS — C25.9 PANCREATIC CARCINOMA METASTATIC TO INTRA-ABDOMINAL LYMPH NODE: ICD-10-CM

## 2020-08-14 LAB
AORTIC ROOT ANNULUS: 2.76 CM
ASCENDING AORTA: 2.49 CM
AV INDEX (PROSTH): 0.87
AV MEAN GRADIENT: 4 MMHG
AV PEAK GRADIENT: 9 MMHG
AV VALVE AREA: 2.38 CM2
AV VELOCITY RATIO: 0.84
BSA FOR ECHO PROCEDURE: 1.33 M2
CV ECHO LV RWT: 0.72 CM
DOP CALC AO PEAK VEL: 1.5 M/S
DOP CALC AO VTI: 30.67 CM
DOP CALC LVOT AREA: 2.7 CM2
DOP CALC LVOT DIAMETER: 1.87 CM
DOP CALC LVOT PEAK VEL: 1.26 M/S
DOP CALC LVOT STROKE VOLUME: 73.1 CM3
DOP CALC RVOT PEAK VEL: 0.92 M/S
DOP CALC RVOT VTI: 15.62 CM
DOP CALCLVOT PEAK VEL VTI: 26.63 CM
E WAVE DECELERATION TIME: 260.3 MSEC
E/A RATIO: 0.56
E/E' RATIO: 9.73 M/S
ECHO LV POSTERIOR WALL: 0.96 CM (ref 0.6–1.1)
FRACTIONAL SHORTENING: 26 % (ref 28–44)
INTERVENTRICULAR SEPTUM: 0.95 CM (ref 0.6–1.1)
IVRT: 93.43 MSEC
LA MAJOR: 3.2 CM
LA MINOR: 2.78 CM
LA WIDTH: 2.38 CM
LEFT ATRIUM SIZE: 1.96 CM
LEFT ATRIUM VOLUME INDEX: 8.7 ML/M2
LEFT ATRIUM VOLUME: 11.8 CM3
LEFT INTERNAL DIMENSION IN SYSTOLE: 1.96 CM (ref 2.1–4)
LEFT VENTRICLE DIASTOLIC VOLUME INDEX: 19.07 ML/M2
LEFT VENTRICLE DIASTOLIC VOLUME: 25.78 ML
LEFT VENTRICLE MASS INDEX: 47 G/M2
LEFT VENTRICLE SYSTOLIC VOLUME INDEX: 8.9 ML/M2
LEFT VENTRICLE SYSTOLIC VOLUME: 12.06 ML
LEFT VENTRICULAR INTERNAL DIMENSION IN DIASTOLE: 2.65 CM (ref 3.5–6)
LEFT VENTRICULAR MASS: 64.01 G
LV LATERAL E/E' RATIO: 8.11 M/S
LV SEPTAL E/E' RATIO: 12.17 M/S
MV PEAK A VEL: 1.3 M/S
MV PEAK E VEL: 0.73 M/S
MV STENOSIS PRESSURE HALF TIME: 75.49 MS
MV VALVE AREA P 1/2 METHOD: 2.91 CM2
PISA TR MAX VEL: 2.6 M/S
PULM VEIN S/D RATIO: 1.64
PV MEAN GRADIENT: 1.63 MMHG
PV PEAK D VEL: 0.33 M/S
PV PEAK S VEL: 0.54 M/S
PV PEAK VELOCITY: 1.12 CM/S
RA MAJOR: 3.06 CM
RA PRESSURE: 3 MMHG
RA WIDTH: 2.11 CM
RIGHT VENTRICULAR END-DIASTOLIC DIMENSION: 2.01 CM
SINUS: 2.52 CM
STJ: 1.98 CM
TDI LATERAL: 0.09 M/S
TDI SEPTAL: 0.06 M/S
TDI: 0.08 M/S
TR MAX PG: 27 MMHG
TV REST PULMONARY ARTERY PRESSURE: 30 MMHG

## 2020-08-14 PROCEDURE — 93306 TTE W/DOPPLER COMPLETE: CPT

## 2020-08-14 PROCEDURE — 78815 PET IMAGE W/CT SKULL-THIGH: CPT | Mod: 26,PS,, | Performed by: RADIOLOGY

## 2020-08-14 PROCEDURE — 93356 ECHO (CUPID ONLY): ICD-10-PCS | Mod: ,,, | Performed by: INTERNAL MEDICINE

## 2020-08-14 PROCEDURE — 93356 MYOCRD STRAIN IMG SPCKL TRCK: CPT | Mod: ,,, | Performed by: INTERNAL MEDICINE

## 2020-08-14 PROCEDURE — 78815 NM PET CT ROUTINE: ICD-10-PCS | Mod: 26,PS,, | Performed by: RADIOLOGY

## 2020-08-14 PROCEDURE — 93306 TTE W/DOPPLER COMPLETE: CPT | Mod: 26,,, | Performed by: INTERNAL MEDICINE

## 2020-08-14 PROCEDURE — A9552 F18 FDG: HCPCS

## 2020-08-14 PROCEDURE — 78815 PET IMAGE W/CT SKULL-THIGH: CPT | Mod: TC

## 2020-08-14 PROCEDURE — 93306 ECHO (CUPID ONLY): ICD-10-PCS | Mod: 26,,, | Performed by: INTERNAL MEDICINE

## 2020-08-17 NOTE — PROGRESS NOTES
Subjective:       Patient ID: Ave Bunch is a 75 y.o. female.    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 Ave Bunch in Hematology Oncology clinic at [facility] on 08/17/2020.  Ms. Ave Bunch is a 75 y.o. gentleman with metastatic pancreatic cancer, currently on gemcitabine.  Has abdominal bloating, nausea, mid back pain, decreased appetite.    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  No family history on file.  Social History     Socioeconomic History    Marital status: Single     Spouse name: Not on file    Number of children: Not on file    Years of education: Not on file    Highest education level: Not on file   Occupational History    Not on file   Social Needs    Financial resource strain: Not on file    Food insecurity     Worry: Not on file     Inability: Not on file    Transportation needs     Medical: Not on file     Non-medical: Not on file   Tobacco Use    Smoking status: Not on file   Substance and Sexual Activity    Alcohol use: Not on file    Drug use: Not on file    Sexual activity: Not on file   Lifestyle    Physical activity     Days per week: Not on file     Minutes per session: Not on file    Stress: Not on file   Relationships    Social connections     Talks on phone: Not on file     Gets together: Not  on file     Attends Confucianist service: Not on file     Active member of club or organization: Not on file     Attends meetings of clubs or organizations: Not on file     Relationship status: Not on file   Other Topics Concern    Not on file   Social History Narrative    Not on file     No past surgical history on file.  Current Outpatient Medications   Medication Sig Dispense Refill    amLODIPine (NORVASC) 5 MG tablet Take 5 mg by mouth 2 (two) times a day.      glipiZIDE (GLUCOTROL) 10 MG TR24 Take 10 mg by mouth 2 (two) times a day.      HYDROcodone-acetaminophen (NORCO)  mg per tablet Take  tablets by mouth every 6 (six) hours as needed.      levothyroxine 150 mcg Cap Take 150 mcg by mouth once daily.      lipase-protease-amylase 6,000-19,000-30,000 units (CREON) 6,000-19,000 -30,000 unit capsule Take 1 capsule by mouth 3 (three) times daily with meals. 90 capsule 11    megestroL (MEGACE) 400 mg/10 mL (40 mg/mL) Susp Take 20 mg by mouth once daily.      ondansetron (ZOFRAN) 8 MG tablet Take 8 mg by mouth every 8 (eight) hours as needed.      ondansetron (ZOFRAN-ODT) 8 MG TbDL Take 1 tablet (8 mg total) by mouth every 12 (twelve) hours as needed. 30 tablet 1    promethazine (PHENERGAN) 25 MG tablet Take 25 mg by mouth every 6 (six) hours as needed.      simvastatin (ZOCOR) 20 MG tablet Take 20 mg by mouth every evening.       No current facility-administered medications for this visit.        Labs:  Lab Results   Component Value Date    WBC 7.24 08/04/2020    HGB 10.9 (L) 08/04/2020    HCT 34.3 (L) 08/04/2020    MCV 88 08/04/2020     08/04/2020     BMP  Lab Results   Component Value Date     08/04/2020    K 3.0 (L) 08/04/2020     08/04/2020    CO2 23 08/04/2020    BUN 10 08/04/2020    CREATININE 0.6 08/04/2020    CALCIUM 8.5 (L) 08/04/2020    ANIONGAP 11 08/04/2020    ESTGFRAFRICA >60 08/04/2020    EGFRNONAA >60 08/04/2020     Lab Results   Component Value Date    ALT 42  08/04/2020    AST 50 (H) 08/04/2020    ALKPHOS 274 (H) 08/04/2020    BILITOT 0.5 08/04/2020       No results found for: IRON, TIBC, FERRITIN, SATURATEDIRO  No results found for: DKZAUHJQ12  No results found for: FOLATE  No results found for: TSH    I have reviewed the radiology reports and examined the scan/xray images.    Review of Systems   Constitutional: Negative.    HENT: Negative.    Eyes: Negative.    Respiratory: Negative.    Cardiovascular: Negative.    Gastrointestinal: Negative.    Endocrine: Negative.    Genitourinary: Negative.    Musculoskeletal: Negative.    Skin: Negative.    Allergic/Immunologic: Negative.    Neurological: Negative.    Hematological: Negative.    Psychiatric/Behavioral: Negative.      ECOG SCORE    2 - Capable of all selfcare but unable to carry out any work activities, active > 50% of hours            Objective:     Vitals:    08/18/20 1205   BP: 126/74   Pulse: 67   Resp: 19   Temp: 97.3 °F (36.3 °C)   Body mass index is 16.7 kg/m².  Physical Exam  Vitals signs and nursing note reviewed.   Constitutional:       Appearance: She is well-developed.   HENT:      Head: Normocephalic and atraumatic.   Eyes:      Conjunctiva/sclera: Conjunctivae normal.   Neck:      Musculoskeletal: Normal range of motion and neck supple.   Cardiovascular:      Rate and Rhythm: Normal rate and regular rhythm.   Pulmonary:      Effort: Pulmonary effort is normal.      Breath sounds: Normal breath sounds.   Abdominal:      General: Bowel sounds are normal.      Palpations: Abdomen is soft.   Musculoskeletal: Normal range of motion.   Skin:     General: Skin is warm and dry.   Neurological:      Mental Status: She is alert and oriented to person, place, and time.   Psychiatric:         Behavior: Behavior normal.         Thought Content: Thought content normal.         Judgment: Judgment normal.       PET CT Impression:     1. Findings consistent with the given history of pancreatic adenocarcinoma with a  large FDG avid mass noted in the region of the pancreatic head.  Small subcentimeter focal area of uptake noted within the right hepatic lobe suspicious for metastatic disease.  Somewhat more diffuse uptake noted within the karen hepatis/gallbladder fossa which may be related to postsurgical change.  Tumor involvement would be difficult to exclude.  2. Bilateral adrenal gland uptake possibly related to a paraneoplastic syndrome or possibly adrenal hyperplasia as there is no discrete adrenal gland nodule to account for the uptake within either adrenal gland.    Assessment:      1. 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  Continue megace, marinol for appetite.  Radiology referral for celiac nerve block.  -     azithromycin (Z-JUICE) 250 MG tablet; Take 1 tablet (250 mg total) by mouth once daily.  Dispense: 30 tablet; Refill: 2  -     IR Celiac Nerve Block; Future; Expected date: 08/18/2020  -     HYDROcodone-acetaminophen (NORCO)  mg per tablet; Take 1 tablet by mouth every 8 (eight) hours as needed for Pain.  Dispense: 60 tablet; Refill: 0  -     dronabinoL (MARINOL) 2.5 MG capsule; Take 1 capsule (2.5 mg total) by mouth 2 (two) times daily before meals.  Dispense: 60 capsule; Refill: 1  -     CBC auto differential; Future; Expected date: 08/25/2020  -     Comprehensive metabolic panel; Future; Expected date: 08/25/2020

## 2020-08-17 NOTE — H&P (VIEW-ONLY)
Subjective:       Patient ID: Ave Bunch is a 75 y.o. female.    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 Ave Bunch in Hematology Oncology clinic at [facility] on 08/17/2020.  Ms. Ave Bunch is a 75 y.o. gentleman with metastatic pancreatic cancer, currently on gemcitabine.  Has abdominal bloating, nausea, mid back pain, decreased appetite.    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  No family history on file.  Social History     Socioeconomic History    Marital status: Single     Spouse name: Not on file    Number of children: Not on file    Years of education: Not on file    Highest education level: Not on file   Occupational History    Not on file   Social Needs    Financial resource strain: Not on file    Food insecurity     Worry: Not on file     Inability: Not on file    Transportation needs     Medical: Not on file     Non-medical: Not on file   Tobacco Use    Smoking status: Not on file   Substance and Sexual Activity    Alcohol use: Not on file    Drug use: Not on file    Sexual activity: Not on file   Lifestyle    Physical activity     Days per week: Not on file     Minutes per session: Not on file    Stress: Not on file   Relationships    Social connections     Talks on phone: Not on file     Gets together: Not  on file     Attends Muslim service: Not on file     Active member of club or organization: Not on file     Attends meetings of clubs or organizations: Not on file     Relationship status: Not on file   Other Topics Concern    Not on file   Social History Narrative    Not on file     No past surgical history on file.  Current Outpatient Medications   Medication Sig Dispense Refill    amLODIPine (NORVASC) 5 MG tablet Take 5 mg by mouth 2 (two) times a day.      glipiZIDE (GLUCOTROL) 10 MG TR24 Take 10 mg by mouth 2 (two) times a day.      HYDROcodone-acetaminophen (NORCO)  mg per tablet Take  tablets by mouth every 6 (six) hours as needed.      levothyroxine 150 mcg Cap Take 150 mcg by mouth once daily.      lipase-protease-amylase 6,000-19,000-30,000 units (CREON) 6,000-19,000 -30,000 unit capsule Take 1 capsule by mouth 3 (three) times daily with meals. 90 capsule 11    megestroL (MEGACE) 400 mg/10 mL (40 mg/mL) Susp Take 20 mg by mouth once daily.      ondansetron (ZOFRAN) 8 MG tablet Take 8 mg by mouth every 8 (eight) hours as needed.      ondansetron (ZOFRAN-ODT) 8 MG TbDL Take 1 tablet (8 mg total) by mouth every 12 (twelve) hours as needed. 30 tablet 1    promethazine (PHENERGAN) 25 MG tablet Take 25 mg by mouth every 6 (six) hours as needed.      simvastatin (ZOCOR) 20 MG tablet Take 20 mg by mouth every evening.       No current facility-administered medications for this visit.        Labs:  Lab Results   Component Value Date    WBC 7.24 08/04/2020    HGB 10.9 (L) 08/04/2020    HCT 34.3 (L) 08/04/2020    MCV 88 08/04/2020     08/04/2020     BMP  Lab Results   Component Value Date     08/04/2020    K 3.0 (L) 08/04/2020     08/04/2020    CO2 23 08/04/2020    BUN 10 08/04/2020    CREATININE 0.6 08/04/2020    CALCIUM 8.5 (L) 08/04/2020    ANIONGAP 11 08/04/2020    ESTGFRAFRICA >60 08/04/2020    EGFRNONAA >60 08/04/2020     Lab Results   Component Value Date    ALT 42  08/04/2020    AST 50 (H) 08/04/2020    ALKPHOS 274 (H) 08/04/2020    BILITOT 0.5 08/04/2020       No results found for: IRON, TIBC, FERRITIN, SATURATEDIRO  No results found for: IINPRMTY76  No results found for: FOLATE  No results found for: TSH    I have reviewed the radiology reports and examined the scan/xray images.    Review of Systems   Constitutional: Negative.    HENT: Negative.    Eyes: Negative.    Respiratory: Negative.    Cardiovascular: Negative.    Gastrointestinal: Negative.    Endocrine: Negative.    Genitourinary: Negative.    Musculoskeletal: Negative.    Skin: Negative.    Allergic/Immunologic: Negative.    Neurological: Negative.    Hematological: Negative.    Psychiatric/Behavioral: Negative.      ECOG SCORE    2 - Capable of all selfcare but unable to carry out any work activities, active > 50% of hours            Objective:     Vitals:    08/18/20 1205   BP: 126/74   Pulse: 67   Resp: 19   Temp: 97.3 °F (36.3 °C)   Body mass index is 16.7 kg/m².  Physical Exam  Vitals signs and nursing note reviewed.   Constitutional:       Appearance: She is well-developed.   HENT:      Head: Normocephalic and atraumatic.   Eyes:      Conjunctiva/sclera: Conjunctivae normal.   Neck:      Musculoskeletal: Normal range of motion and neck supple.   Cardiovascular:      Rate and Rhythm: Normal rate and regular rhythm.   Pulmonary:      Effort: Pulmonary effort is normal.      Breath sounds: Normal breath sounds.   Abdominal:      General: Bowel sounds are normal.      Palpations: Abdomen is soft.   Musculoskeletal: Normal range of motion.   Skin:     General: Skin is warm and dry.   Neurological:      Mental Status: She is alert and oriented to person, place, and time.   Psychiatric:         Behavior: Behavior normal.         Thought Content: Thought content normal.         Judgment: Judgment normal.       PET CT Impression:     1. Findings consistent with the given history of pancreatic adenocarcinoma with a  large FDG avid mass noted in the region of the pancreatic head.  Small subcentimeter focal area of uptake noted within the right hepatic lobe suspicious for metastatic disease.  Somewhat more diffuse uptake noted within the karen hepatis/gallbladder fossa which may be related to postsurgical change.  Tumor involvement would be difficult to exclude.  2. Bilateral adrenal gland uptake possibly related to a paraneoplastic syndrome or possibly adrenal hyperplasia as there is no discrete adrenal gland nodule to account for the uptake within either adrenal gland.    Assessment:      1. 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  Continue megace, marinol for appetite.  Radiology referral for celiac nerve block.  -     azithromycin (Z-JUICE) 250 MG tablet; Take 1 tablet (250 mg total) by mouth once daily.  Dispense: 30 tablet; Refill: 2  -     IR Celiac Nerve Block; Future; Expected date: 08/18/2020  -     HYDROcodone-acetaminophen (NORCO)  mg per tablet; Take 1 tablet by mouth every 8 (eight) hours as needed for Pain.  Dispense: 60 tablet; Refill: 0  -     dronabinoL (MARINOL) 2.5 MG capsule; Take 1 capsule (2.5 mg total) by mouth 2 (two) times daily before meals.  Dispense: 60 capsule; Refill: 1  -     CBC auto differential; Future; Expected date: 08/25/2020  -     Comprehensive metabolic panel; Future; Expected date: 08/25/2020

## 2020-08-18 ENCOUNTER — TELEPHONE (OUTPATIENT)
Dept: RADIOLOGY | Facility: HOSPITAL | Age: 75
End: 2020-08-18

## 2020-08-18 ENCOUNTER — OFFICE VISIT (OUTPATIENT)
Dept: HEMATOLOGY/ONCOLOGY | Facility: CLINIC | Age: 75
End: 2020-08-18
Payer: MEDICARE

## 2020-08-18 ENCOUNTER — DOCUMENTATION ONLY (OUTPATIENT)
Dept: HEMATOLOGY/ONCOLOGY | Facility: CLINIC | Age: 75
End: 2020-08-18

## 2020-08-18 ENCOUNTER — SOCIAL WORK (OUTPATIENT)
Dept: HEMATOLOGY/ONCOLOGY | Facility: CLINIC | Age: 75
End: 2020-08-18

## 2020-08-18 ENCOUNTER — INFUSION (OUTPATIENT)
Dept: INFUSION THERAPY | Facility: HOSPITAL | Age: 75
End: 2020-08-18
Attending: INTERNAL MEDICINE
Payer: MEDICARE

## 2020-08-18 VITALS
OXYGEN SATURATION: 99 % | WEIGHT: 88.38 LBS | DIASTOLIC BLOOD PRESSURE: 65 MMHG | RESPIRATION RATE: 16 BRPM | RESPIRATION RATE: 19 BRPM | WEIGHT: 88.38 LBS | HEART RATE: 67 BPM | SYSTOLIC BLOOD PRESSURE: 125 MMHG | BODY MASS INDEX: 16.69 KG/M2 | HEIGHT: 61 IN | TEMPERATURE: 97 F | HEART RATE: 60 BPM | HEIGHT: 61 IN | OXYGEN SATURATION: 95 % | BODY MASS INDEX: 16.69 KG/M2 | TEMPERATURE: 99 F | DIASTOLIC BLOOD PRESSURE: 74 MMHG | SYSTOLIC BLOOD PRESSURE: 126 MMHG

## 2020-08-18 DIAGNOSIS — C25.9 PANCREATIC CARCINOMA METASTATIC TO INTRA-ABDOMINAL LYMPH NODE: Primary | ICD-10-CM

## 2020-08-18 DIAGNOSIS — E87.6 HYPOKALEMIA: ICD-10-CM

## 2020-08-18 DIAGNOSIS — C77.2 PANCREATIC CARCINOMA METASTATIC TO INTRA-ABDOMINAL LYMPH NODE: Primary | ICD-10-CM

## 2020-08-18 LAB
INR PPP: 1.2 (ref 0.8–1.2)
PROTHROMBIN TIME: 12.7 SEC (ref 9–12.5)
SARS-COV-2 RDRP RESP QL NAA+PROBE: NEGATIVE

## 2020-08-18 PROCEDURE — 25000003 PHARM REV CODE 250: Performed by: INTERNAL MEDICINE

## 2020-08-18 PROCEDURE — A4216 STERILE WATER/SALINE, 10 ML: HCPCS | Performed by: INTERNAL MEDICINE

## 2020-08-18 PROCEDURE — 99215 PR OFFICE/OUTPT VISIT, EST, LEVL V, 40-54 MIN: ICD-10-PCS | Mod: 25,S$PBB,, | Performed by: INTERNAL MEDICINE

## 2020-08-18 PROCEDURE — U0002 COVID-19 LAB TEST NON-CDC: HCPCS

## 2020-08-18 PROCEDURE — 99999 PR PBB SHADOW E&M-EST. PATIENT-LVL III: ICD-10-PCS | Mod: PBBFAC,,, | Performed by: INTERNAL MEDICINE

## 2020-08-18 PROCEDURE — 96367 TX/PROPH/DG ADDL SEQ IV INF: CPT

## 2020-08-18 PROCEDURE — 85610 PROTHROMBIN TIME: CPT

## 2020-08-18 PROCEDURE — 96413 CHEMO IV INFUSION 1 HR: CPT

## 2020-08-18 PROCEDURE — 63600175 PHARM REV CODE 636 W HCPCS: Performed by: INTERNAL MEDICINE

## 2020-08-18 PROCEDURE — 99999 PR PBB SHADOW E&M-EST. PATIENT-LVL III: CPT | Mod: PBBFAC,,, | Performed by: INTERNAL MEDICINE

## 2020-08-18 PROCEDURE — 99215 OFFICE O/P EST HI 40 MIN: CPT | Mod: 25,S$PBB,, | Performed by: INTERNAL MEDICINE

## 2020-08-18 PROCEDURE — 99213 OFFICE O/P EST LOW 20 MIN: CPT | Mod: PBBFAC,25 | Performed by: INTERNAL MEDICINE

## 2020-08-18 PROCEDURE — 96361 HYDRATE IV INFUSION ADD-ON: CPT

## 2020-08-18 RX ORDER — HEPARIN 100 UNIT/ML
500 SYRINGE INTRAVENOUS
Status: DISCONTINUED | OUTPATIENT
Start: 2020-08-18 | End: 2020-08-18 | Stop reason: HOSPADM

## 2020-08-18 RX ORDER — AZITHROMYCIN 250 MG/1
250 TABLET, FILM COATED ORAL DAILY
Qty: 30 TABLET | Refills: 2 | Status: SHIPPED | OUTPATIENT
Start: 2020-08-18

## 2020-08-18 RX ORDER — HYDROCODONE BITARTRATE AND ACETAMINOPHEN 10; 325 MG/1; MG/1
1 TABLET ORAL EVERY 8 HOURS PRN
Qty: 60 TABLET | Refills: 0 | Status: SHIPPED | OUTPATIENT
Start: 2020-08-18

## 2020-08-18 RX ORDER — HEPARIN 100 UNIT/ML
500 SYRINGE INTRAVENOUS
Status: CANCELLED | OUTPATIENT
Start: 2020-08-18

## 2020-08-18 RX ORDER — AMOXICILLIN AND CLAVULANATE POTASSIUM 500; 125 MG/1; MG/1
TABLET, FILM COATED ORAL
COMMUNITY
Start: 2020-06-20 | End: 2020-06-23

## 2020-08-18 RX ORDER — SODIUM CHLORIDE 0.9 % (FLUSH) 0.9 %
10 SYRINGE (ML) INJECTION
Status: DISCONTINUED | OUTPATIENT
Start: 2020-08-18 | End: 2020-08-18 | Stop reason: HOSPADM

## 2020-08-18 RX ORDER — SODIUM CHLORIDE 0.9 % (FLUSH) 0.9 %
10 SYRINGE (ML) INJECTION
Status: CANCELLED | OUTPATIENT
Start: 2020-08-18

## 2020-08-18 RX ORDER — HEPARIN 100 UNIT/ML
500 SYRINGE INTRAVENOUS
Status: CANCELLED | OUTPATIENT
Start: 2020-08-19

## 2020-08-18 RX ORDER — DRONABINOL 2.5 MG/1
2.5 CAPSULE ORAL
Qty: 60 CAPSULE | Refills: 1 | Status: SHIPPED | OUTPATIENT
Start: 2020-08-18

## 2020-08-18 RX ORDER — SODIUM CHLORIDE 0.9 % (FLUSH) 0.9 %
10 SYRINGE (ML) INJECTION
Status: CANCELLED | OUTPATIENT
Start: 2020-08-19

## 2020-08-18 RX ADMIN — POTASSIUM CHLORIDE: 149 INJECTION, SOLUTION, CONCENTRATE INTRAVENOUS at 02:08

## 2020-08-18 RX ADMIN — DEXAMETHASONE SODIUM PHOSPHATE 10 MG: 4 INJECTION, SOLUTION INTRAMUSCULAR; INTRAVENOUS at 01:08

## 2020-08-18 RX ADMIN — HEPARIN 500 UNITS: 100 SYRINGE at 04:08

## 2020-08-18 RX ADMIN — GEMCITABINE HYDROCHLORIDE 1340 MG: 2 INJECTION, SOLUTION INTRAVENOUS at 02:08

## 2020-08-18 RX ADMIN — Medication 10 ML: at 01:08

## 2020-08-18 NOTE — TELEPHONE ENCOUNTER
Interventional Radiology:  Carly at [facility] called and asked if we could do Celiac Nerve Block, Dr. Long approved.  Patient is not on blood thinners.  Patient scheduled for tomorrow.

## 2020-08-18 NOTE — DISCHARGE INSTRUCTIONS
[facility]  [address] [address]  [address]  [phone] phone     [phone] fax  Hours of Operation: Monday- Friday 8:00am- 5:00pm  After hours phone  [phone]  Hematology / Oncology Physicians on call      NATALIA Enrique Dr., Dr., Dr., Dr., NP Sydney Prescott, NP Tyesha Taylor, NP    Please call with any concerns regarding your appointment today.HOME CARE AFTER CHEMOTHERAPY   Meals   Many patients feel sick and lose their appetites during treatment. Eat small meals several times a day. Choose bland foods with little taste or smell if you have problems with nausea. Be sure to cook all food thoroughly. This kills bacteria and helps you avoid intestinal infection. Soft foods are easier to swallow and digest.   Activity   Exercise keeps you strong and keeps your heart and lungs active. Talk to your doctor about an appropriate exercise program for you.   Skin Care   To prevent a skin infection, bathe or shower once a day. Use a moisturizing soap and wash with warm water. Avoid very hot or cold water. Chemotherapy can make your skin dry . Apply moisturizing lotion to help relieve dry skin. Some drugs used in high doses can cause slight burns to appear (like sunburn). Ask for a special cream to help relieve the burn and protect your skin.   Prevent Mouth Sores   During chemotherapy, many people get mouth sores. Do the following to help prevent mouth sores or to ease discomfort.   Brush your teeth with a soft-bristle toothbrush after every meal.  Don't use dental floss if your platelet count is below 50,000. Your doctor or nurse will tell you if this is the case.  Use an oral swab or special soft toothbrush if your gums bleed during regular brushing.  Use mouthwash as directed. If you can't tolerate commercial mouthwash, use salt and baking soda to clean your mouth. Mix 1 teaspoon of salt and 1  teaspoon of baking soda into a glass of water. Swish and spit.  Call your doctor or return to this facility if you develop any of the following:   Sore throat   White patches in the mouth or throat   Fever of 100.4ºF (38ºC) or higher, or as directed by your healthcare provider  © 2000-2011 [facility], [address] [place] [address]. All rights reserved. This information is not intended as a substitute for professional medical care. Always follow your healthcare professional's   FALL PREVENTION   Falls often occur due to slipping, tripping or losing your balance. Here are ways to reduce your risk of falling again.   Was there anything that caused your fall that can be fixed, removed or replaced?   Make your home safe by keeping walkways clear of objects you may trip over.   Use non-slip pads under rugs.   Do not walk in poorly lit areas.   Do not stand on chairs or wobbly ladders.   Use caution when reaching overhead or looking upward. This position can cause a loss of balance.   Be sure your shoes fit properly, have non-slip bottoms and are in good condition.   Be cautious when going up and down stairs, curbs, and when walking on uneven sidewalks.   If your balance is poor, consider using a cane or walker.   If your fall was related to alcohol use, stop or limit alcohol intake.   If your fall was related to use of sleeping medicines, talk to your doctor about this. You may need to reduce your dosage at bedtime if you awaken during the night to go to the bathroom.   To reduce the need for nighttime bathroom trips:   Avoid drinking fluids for several hours before going to bed   Empty your bladder before going to bed   Men can keep a urinal at the bedside   © 2000-2011 [facility], [address] [place] [address]. All rights reserved. This information is not intended as a substitute for professional medical care. Always follow your healthcare professional's instructions.  Support  "Groups/Classes    Support groups and classes are being offered at the   [facility] and [facility]!!    "Cooking with Cancer" (Nutrition Class):  Second Wednesday of each month   at noon at the Cancer Center.  Metastatic Support Group:  Third Tuesday of each month   at noon at the Cancer Center.  Next Steps Class/Group: Second and fourth Thursday of each month at noon at the Cancer Center.  [facility] (Breast Cancer Support Group): First Tuesday of each month   at 5:30pm at the [facility] location.  [facility]: [facility]: Second and third Tuesday of each month from 7:30am - 2pm.  [facility]: First and fourth Tuesday of each month from 7:30am - 2pm    If you are interested in attending or would like more information please ask our social workers or your nurse!  "

## 2020-08-18 NOTE — PROGRESS NOTES
Errol met with patient and his daughter Marichuy. Sw introduced herself and explained her role. Patient travels from [facility] for treatment. Sw offered $100 gas card to assist daughter with transporting patient to and from appointments. Errol provided Marichuy new patient packet. Marcihuy was interested in [facility]. Sw faxed referral to [facility] with specification for Boost Breeze. Pt reports she does not like the milk based Boost. Sw did not have any Boost Breeze in office. Marichuy reports she will call [facility] on tomorrow and  Boost for patient. Sw provided her contact card to call if she have any needs that may arise. Sw will continue to follow up.     Oncology Social Work   Intake  Date of Diagnosis: 07/13/20  Cancer Type: Pancreatic  MD Assigned: Dr. Justin Onofre  Patient currently being followed by outpatient case management: No  Initial social work contact: 08/18/20  Referral contact method: Workqueue  Contact method: In person  Start of Treatment: 08/18/20  Diagnosis to Treat Timeline (days): 36 days     Intervention  Current Status: Active  Treatment Type(s): Chemotherapy  Chemotherapy Regimen: Gemzar    General Referrals: Social work  Social Work Referral Date: 08/04/20    Support Systems: Children  Concerns: None at this time     Supportive Checklist      Follow Up  No follow-ups on file.

## 2020-08-18 NOTE — NURSING
Reviewed pt status with infusion nurses. Pt to have rapid covid test prior to first tx today. Will follow.

## 2020-08-19 ENCOUNTER — DOCUMENTATION ONLY (OUTPATIENT)
Dept: HEMATOLOGY/ONCOLOGY | Facility: CLINIC | Age: 75
End: 2020-08-19

## 2020-08-19 ENCOUNTER — INFUSION (OUTPATIENT)
Dept: INFUSION THERAPY | Facility: HOSPITAL | Age: 75
End: 2020-08-19
Payer: MEDICARE

## 2020-08-19 VITALS
BODY MASS INDEX: 16.69 KG/M2 | HEIGHT: 61 IN | RESPIRATION RATE: 16 BRPM | SYSTOLIC BLOOD PRESSURE: 139 MMHG | OXYGEN SATURATION: 99 % | WEIGHT: 88.38 LBS | TEMPERATURE: 98 F | DIASTOLIC BLOOD PRESSURE: 78 MMHG | HEART RATE: 64 BPM

## 2020-08-19 DIAGNOSIS — E87.6 HYPOKALEMIA: Primary | ICD-10-CM

## 2020-08-19 PROCEDURE — 96361 HYDRATE IV INFUSION ADD-ON: CPT

## 2020-08-19 PROCEDURE — 96365 THER/PROPH/DIAG IV INF INIT: CPT

## 2020-08-19 PROCEDURE — 96366 THER/PROPH/DIAG IV INF ADDON: CPT

## 2020-08-19 PROCEDURE — 63600175 PHARM REV CODE 636 W HCPCS: Performed by: INTERNAL MEDICINE

## 2020-08-19 PROCEDURE — 96375 TX/PRO/DX INJ NEW DRUG ADDON: CPT

## 2020-08-19 PROCEDURE — A4216 STERILE WATER/SALINE, 10 ML: HCPCS | Performed by: INTERNAL MEDICINE

## 2020-08-19 PROCEDURE — 96360 HYDRATION IV INFUSION INIT: CPT

## 2020-08-19 PROCEDURE — 25000003 PHARM REV CODE 250: Performed by: INTERNAL MEDICINE

## 2020-08-19 RX ORDER — ONDANSETRON 2 MG/ML
8 INJECTION INTRAMUSCULAR; INTRAVENOUS
Status: COMPLETED | OUTPATIENT
Start: 2020-08-19 | End: 2020-08-19

## 2020-08-19 RX ORDER — SODIUM CHLORIDE 0.9 % (FLUSH) 0.9 %
10 SYRINGE (ML) INJECTION
Status: DISCONTINUED | OUTPATIENT
Start: 2020-08-19 | End: 2020-08-19 | Stop reason: HOSPADM

## 2020-08-19 RX ORDER — HEPARIN 100 UNIT/ML
500 SYRINGE INTRAVENOUS
Status: DISCONTINUED | OUTPATIENT
Start: 2020-08-19 | End: 2020-08-19 | Stop reason: HOSPADM

## 2020-08-19 RX ADMIN — POTASSIUM CHLORIDE: 149 INJECTION, SOLUTION, CONCENTRATE INTRAVENOUS at 09:08

## 2020-08-19 RX ADMIN — HEPARIN 500 UNITS: 100 SYRINGE at 01:08

## 2020-08-19 RX ADMIN — Medication 10 ML: at 09:08

## 2020-08-19 RX ADMIN — ONDANSETRON 8 MG: 2 INJECTION INTRAMUSCULAR; INTRAVENOUS at 12:08

## 2020-08-19 NOTE — DISCHARGE INSTRUCTIONS
[facility]  [address] [address]  [address]  [phone] phone     [phone] fax  Hours of Operation: Monday- Friday 8:00am- 5:00pm  After hours phone  [phone]  Hematology / Oncology Physicians on call      Dr. Ermias Londono, NATALIA Tidwell NP Tyesha Taylor, NP    Please call with any concerns regarding your appointment today.FALL PREVENTION   Falls often occur due to slipping, tripping or losing your balance. Here are ways to reduce your risk of falling again.   Was there anything that caused your fall that can be fixed, removed or replaced?   Make your home safe by keeping walkways clear of objects you may trip over.   Use non-slip pads under rugs.   Do not walk in poorly lit areas.   Do not stand on chairs or wobbly ladders.   Use caution when reaching overhead or looking upward. This position can cause a loss of balance.   Be sure your shoes fit properly, have non-slip bottoms and are in good condition.   Be cautious when going up and down stairs, curbs, and when walking on uneven sidewalks.   If your balance is poor, consider using a cane or walker.   If your fall was related to alcohol use, stop or limit alcohol intake.   If your fall was related to use of sleeping medicines, talk to your doctor about this. You may need to reduce your dosage at bedtime if you awaken during the night to go to the bathroom.   To reduce the need for nighttime bathroom trips:   Avoid drinking fluids for several hours before going to bed   Empty your bladder before going to bed   Men can keep a urinal at the bedside   © 5348-3190 [facility] [facility], [address] [place] [address], [place] [address]. All rights reserved. This information is not intended as a substitute for professional medical care. Always follow your healthcare professional's instructions.

## 2020-08-19 NOTE — PROGRESS NOTES
Errol called patient's daughter to report on [facility] not being able to provide patient with Boost Breeze due to funding. Errol reports the Cancer Center will order pay a couple cases and asked her preferred flavor. She reports patient would prefer the wild kennedy. Errol will call Marichuy once Boost has been delivered. Errol will continue to f/u.

## 2020-08-21 ENCOUNTER — HOSPITAL ENCOUNTER (OUTPATIENT)
Dept: RADIOLOGY | Facility: HOSPITAL | Age: 75
Discharge: HOME | End: 2020-08-21
Attending: INTERNAL MEDICINE
Payer: MEDICARE

## 2020-08-21 VITALS
RESPIRATION RATE: 16 BRPM | BODY MASS INDEX: 16.62 KG/M2 | SYSTOLIC BLOOD PRESSURE: 121 MMHG | DIASTOLIC BLOOD PRESSURE: 59 MMHG | HEIGHT: 61 IN | WEIGHT: 88 LBS | OXYGEN SATURATION: 97 % | HEART RATE: 52 BPM

## 2020-08-21 DIAGNOSIS — C77.2 PANCREATIC CARCINOMA METASTATIC TO INTRA-ABDOMINAL LYMPH NODE: ICD-10-CM

## 2020-08-21 DIAGNOSIS — C25.9 PANCREATIC CARCINOMA METASTATIC TO INTRA-ABDOMINAL LYMPH NODE: ICD-10-CM

## 2020-08-21 PROCEDURE — 77002 NEEDLE LOCALIZATION BY XRAY: CPT | Mod: TC,59

## 2020-08-21 PROCEDURE — 63600175 PHARM REV CODE 636 W HCPCS: Performed by: RADIOLOGY

## 2020-08-21 PROCEDURE — 25500020 PHARM REV CODE 255: Performed by: INTERNAL MEDICINE

## 2020-08-21 PROCEDURE — 25000003 PHARM REV CODE 250: Performed by: RADIOLOGY

## 2020-08-21 RX ORDER — DEXAMETHASONE SODIUM PHOSPHATE 100 MG/10ML
INJECTION INTRAMUSCULAR; INTRAVENOUS CODE/TRAUMA/SEDATION MEDICATION
Status: COMPLETED | OUTPATIENT
Start: 2020-08-21 | End: 2020-08-21

## 2020-08-21 RX ORDER — MIDAZOLAM HYDROCHLORIDE 1 MG/ML
INJECTION INTRAMUSCULAR; INTRAVENOUS CODE/TRAUMA/SEDATION MEDICATION
Status: COMPLETED | OUTPATIENT
Start: 2020-08-21 | End: 2020-08-21

## 2020-08-21 RX ORDER — FENTANYL CITRATE 50 UG/ML
INJECTION, SOLUTION INTRAMUSCULAR; INTRAVENOUS CODE/TRAUMA/SEDATION MEDICATION
Status: COMPLETED | OUTPATIENT
Start: 2020-08-21 | End: 2020-08-21

## 2020-08-21 RX ORDER — BUPIVACAINE HYDROCHLORIDE 2.5 MG/ML
INJECTION, SOLUTION EPIDURAL; INFILTRATION; INTRACAUDAL CODE/TRAUMA/SEDATION MEDICATION
Status: COMPLETED | OUTPATIENT
Start: 2020-08-21 | End: 2020-08-21

## 2020-08-21 RX ADMIN — BUPIVACAINE HYDROCHLORIDE 30 ML: 2.5 INJECTION, SOLUTION EPIDURAL; INFILTRATION; INTRACAUDAL; PERINEURAL at 01:08

## 2020-08-21 RX ADMIN — IOHEXOL 10 ML: 350 INJECTION, SOLUTION INTRAVENOUS at 01:08

## 2020-08-21 RX ADMIN — FENTANYL CITRATE 25 MCG: 50 INJECTION, SOLUTION INTRAMUSCULAR; INTRAVENOUS at 01:08

## 2020-08-21 RX ADMIN — MIDAZOLAM HYDROCHLORIDE 0.5 MG: 1 INJECTION, SOLUTION INTRAMUSCULAR; INTRAVENOUS at 01:08

## 2020-08-21 RX ADMIN — DEXAMETHASONE SODIUM PHOSPHATE 16 MG: 10 INJECTION INTRAMUSCULAR; INTRAVENOUS at 01:08

## 2020-08-21 NOTE — DISCHARGE INSTRUCTIONS
Recovery After Procedural Sedation (Adult)   You have been given medicine by vein to make you sleep during your surgery. This may have included both a pain medicine and sleeping medicine. Most of the effects have worn off. But you may still have some drowsiness for the next 6 to 8 hours.  Home care  Follow these guidelines when you get home:  · For the next 8 hours, you should be watched by a responsible adult. This person should make sure your condition is not getting worse.  · Don't drink any alcohol for the next 24 hours.  · Don't drive, operate dangerous machinery, or make important business or personal decisions during the next 24 hours.  · To prevent injury or falls, use caution when standing and walking for at least 24 hours after your procedure.  Note: Your healthcare provider may tell you not to take any medicine by mouth for pain or sleep in the next 4 hours. These medicines may react with the medicines you were given in the hospital. This could cause a much stronger response than usual.  Follow-up care  Follow up with your healthcare provider if you are not alert and back to your usual level of activity within 12 hours.  When to seek medical advice  Call your healthcare provider right away if any of these occur:  · Drowsiness gets worse  · Weakness or dizziness gets worse  · Repeated vomiting  · You can't be awakened  · Fever  · New rash  [employer] last reviewed this educational content on 9/1/2019  © 2719-2700 [employer]. [address] [place] [address], [place] [address]. All rights reserved. This information is not intended as a substitute for professional medical care. Always follow your healthcare professional's instructions.

## 2020-08-21 NOTE — SEDATION DOCUMENTATION
Pt placed supine on CT table with arms above her head. CM in place, no acute distress noted and VSS. Pt verbalizes understanding of procedure.

## 2020-08-21 NOTE — DISCHARGE SUMMARY
Pre Op Diagnosis: abdominal pain     Post Op Diagnosis: same     Procedure:  Celiac block     Procedure performed by: Ethan JEFFERSON, Madiha SHIELDS     Written Informed Consent Obtained: Yes     Specimen Removed:  no     Estimated Blood Loss:  minimal     Findings: Local anesthesia and moderate sedation were used.     The patient tolerated the procedure well and there were no complications.      Sterile technique was performed in the mid upper abdomen, lidocaine was used as a local anesthetic.  Medicinal agents administered near celiac ganglion.  Pt tolerated the procedure well without immediate complications.  Please see radiologist report for details. F/u with PCP and/or ordering physician.

## 2020-08-21 NOTE — SEDATION DOCUMENTATION
Procedure completed at this time. VSS, no acute distress noted and pt tolerated procedure well. Band aid placed to RU C/D/I with no bleeding noted to site. Pt transferred to stretcher and transported back to recovery bay. Will continue to monitor pt.

## 2020-08-21 NOTE — PLAN OF CARE
Pt discharged to home with family.  Pt d/c home in stable condition via wheelchair with family.  Verbalized understanding of d/c instructions, refused paper handout.  Pt stated that they use my chart often.   Pt voiced no complaints at this time, denied pain or discomfort, encouraged to follow up with ordering physican.

## 2020-08-25 ENCOUNTER — OFFICE VISIT (OUTPATIENT)
Dept: HEMATOLOGY/ONCOLOGY | Facility: CLINIC | Age: 75
End: 2020-08-25
Payer: MEDICARE

## 2020-08-25 ENCOUNTER — DOCUMENTATION ONLY (OUTPATIENT)
Dept: HEMATOLOGY/ONCOLOGY | Facility: CLINIC | Age: 75
End: 2020-08-25

## 2020-08-25 ENCOUNTER — INFUSION (OUTPATIENT)
Dept: INFUSION THERAPY | Facility: HOSPITAL | Age: 75
End: 2020-08-25
Attending: INTERNAL MEDICINE
Payer: MEDICARE

## 2020-08-25 VITALS
BODY MASS INDEX: 16.44 KG/M2 | HEIGHT: 61 IN | WEIGHT: 87.06 LBS | HEART RATE: 67 BPM | TEMPERATURE: 98 F | RESPIRATION RATE: 16 BRPM | SYSTOLIC BLOOD PRESSURE: 130 MMHG | DIASTOLIC BLOOD PRESSURE: 71 MMHG | OXYGEN SATURATION: 98 %

## 2020-08-25 VITALS
OXYGEN SATURATION: 100 % | TEMPERATURE: 99 F | DIASTOLIC BLOOD PRESSURE: 67 MMHG | HEART RATE: 74 BPM | RESPIRATION RATE: 16 BRPM | BODY MASS INDEX: 16.44 KG/M2 | WEIGHT: 87.06 LBS | HEIGHT: 61 IN | SYSTOLIC BLOOD PRESSURE: 145 MMHG

## 2020-08-25 DIAGNOSIS — R63.4 WEIGHT LOSS: ICD-10-CM

## 2020-08-25 DIAGNOSIS — E86.0 DEHYDRATION: Primary | ICD-10-CM

## 2020-08-25 DIAGNOSIS — C25.9 PANCREATIC CARCINOMA METASTATIC TO INTRA-ABDOMINAL LYMPH NODE: ICD-10-CM

## 2020-08-25 DIAGNOSIS — C77.2 PANCREATIC CARCINOMA METASTATIC TO INTRA-ABDOMINAL LYMPH NODE: ICD-10-CM

## 2020-08-25 DIAGNOSIS — E87.6 HYPOKALEMIA: ICD-10-CM

## 2020-08-25 PROCEDURE — 99999 PR PBB SHADOW E&M-EST. PATIENT-LVL III: CPT | Mod: PBBFAC,,, | Performed by: INTERNAL MEDICINE

## 2020-08-25 PROCEDURE — 25000003 PHARM REV CODE 250: Performed by: INTERNAL MEDICINE

## 2020-08-25 PROCEDURE — 99999 PR PBB SHADOW E&M-EST. PATIENT-LVL III: ICD-10-PCS | Mod: PBBFAC,,, | Performed by: INTERNAL MEDICINE

## 2020-08-25 PROCEDURE — 96413 CHEMO IV INFUSION 1 HR: CPT

## 2020-08-25 PROCEDURE — 99215 OFFICE O/P EST HI 40 MIN: CPT | Mod: 25,S$PBB,, | Performed by: INTERNAL MEDICINE

## 2020-08-25 PROCEDURE — 96367 TX/PROPH/DG ADDL SEQ IV INF: CPT

## 2020-08-25 PROCEDURE — A4216 STERILE WATER/SALINE, 10 ML: HCPCS | Performed by: INTERNAL MEDICINE

## 2020-08-25 PROCEDURE — 99215 PR OFFICE/OUTPT VISIT, EST, LEVL V, 40-54 MIN: ICD-10-PCS | Mod: 25,S$PBB,, | Performed by: INTERNAL MEDICINE

## 2020-08-25 PROCEDURE — 96361 HYDRATE IV INFUSION ADD-ON: CPT

## 2020-08-25 PROCEDURE — 99213 OFFICE O/P EST LOW 20 MIN: CPT | Mod: PBBFAC,25 | Performed by: INTERNAL MEDICINE

## 2020-08-25 PROCEDURE — 96375 TX/PRO/DX INJ NEW DRUG ADDON: CPT

## 2020-08-25 PROCEDURE — 63600175 PHARM REV CODE 636 W HCPCS: Performed by: INTERNAL MEDICINE

## 2020-08-25 RX ORDER — ONDANSETRON 2 MG/ML
8 INJECTION INTRAMUSCULAR; INTRAVENOUS
Status: COMPLETED | OUTPATIENT
Start: 2020-08-25 | End: 2020-08-25

## 2020-08-25 RX ORDER — HEPARIN 100 UNIT/ML
500 SYRINGE INTRAVENOUS
Status: DISCONTINUED | OUTPATIENT
Start: 2020-08-25 | End: 2020-08-25 | Stop reason: HOSPADM

## 2020-08-25 RX ORDER — HEPARIN 100 UNIT/ML
500 SYRINGE INTRAVENOUS
Status: CANCELLED | OUTPATIENT
Start: 2020-08-25

## 2020-08-25 RX ORDER — SODIUM CHLORIDE 0.9 % (FLUSH) 0.9 %
10 SYRINGE (ML) INJECTION
Status: CANCELLED | OUTPATIENT
Start: 2020-08-25

## 2020-08-25 RX ORDER — SODIUM CHLORIDE 0.9 % (FLUSH) 0.9 %
10 SYRINGE (ML) INJECTION
Status: DISCONTINUED | OUTPATIENT
Start: 2020-08-25 | End: 2020-08-25 | Stop reason: HOSPADM

## 2020-08-25 RX ORDER — ONDANSETRON 2 MG/ML
8 INJECTION INTRAMUSCULAR; INTRAVENOUS
Status: CANCELLED | OUTPATIENT
Start: 2020-08-25

## 2020-08-25 RX ADMIN — POTASSIUM CHLORIDE: 149 INJECTION, SOLUTION, CONCENTRATE INTRAVENOUS at 10:08

## 2020-08-25 RX ADMIN — Medication 10 ML: at 10:08

## 2020-08-25 RX ADMIN — HEPARIN 500 UNITS: 100 SYRINGE at 03:08

## 2020-08-25 RX ADMIN — ONDANSETRON HYDROCHLORIDE 8 MG: 2 INJECTION INTRAMUSCULAR; INTRAVENOUS at 10:08

## 2020-08-25 RX ADMIN — DEXAMETHASONE SODIUM PHOSPHATE 10 MG: 4 INJECTION, SOLUTION INTRAMUSCULAR; INTRAVENOUS at 10:08

## 2020-08-25 RX ADMIN — GEMCITABINE 1340 MG: 38 INJECTION, SOLUTION INTRAVENOUS at 11:08

## 2020-08-25 NOTE — DISCHARGE INSTRUCTIONS
[facility]  [address] [facility]  [address] [address]  [phone] phone     [phone] fax  Hours of Operation: Monday- Friday 8:00am- 5:00pm  After hours phone  [phone]  Hematology / Oncology Physicians on call      Dr. Ermias Londono, NATAILA Tidwell NP Tyesha Taylor, NP    Please call with any concerns regarding your appointment today.FALL PREVENTION   Falls often occur due to slipping, tripping or losing your balance. Here are ways to reduce your risk of falling again.   Was there anything that caused your fall that can be fixed, removed or replaced?   Make your home safe by keeping walkways clear of objects you may trip over.   Use non-slip pads under rugs.   Do not walk in poorly lit areas.   Do not stand on chairs or wobbly ladders.   Use caution when reaching overhead or looking upward. This position can cause a loss of balance.   Be sure your shoes fit properly, have non-slip bottoms and are in good condition.   Be cautious when going up and down stairs, curbs, and when walking on uneven sidewalks.   If your balance is poor, consider using a cane or walker.   If your fall was related to alcohol use, stop or limit alcohol intake.   If your fall was related to use of sleeping medicines, talk to your doctor about this. You may need to reduce your dosage at bedtime if you awaken during the night to go to the bathroom.   To reduce the need for nighttime bathroom trips:   Avoid drinking fluids for several hours before going to bed   Empty your bladder before going to bed   Men can keep a urinal at the bedside   © 0496-3530 [facility], [address] [place] [address], [place] [address]. All rights reserved. This information is not intended as a substitute for professional medical care. Always follow your healthcare professional's instructions.  HOME CARE AFTER CHEMOTHERAPY   Meals    Many patients feel sick and lose their appetites during treatment. Eat small meals several times a day. Choose bland foods with little taste or smell if you have problems with nausea. Be sure to cook all food thoroughly. This kills bacteria and helps you avoid intestinal infection. Soft foods are easier to swallow and digest.   Activity   Exercise keeps you strong and keeps your heart and lungs active. Talk to your doctor about an appropriate exercise program for you.   Skin Care   To prevent a skin infection, bathe or shower once a day. Use a moisturizing soap and wash with warm water. Avoid very hot or cold water. Chemotherapy can make your skin dry . Apply moisturizing lotion to help relieve dry skin. Some drugs used in high doses can cause slight burns to appear (like sunburn). Ask for a special cream to help relieve the burn and protect your skin.   Prevent Mouth Sores   During chemotherapy, many people get mouth sores. Do the following to help prevent mouth sores or to ease discomfort.   Brush your teeth with a soft-bristle toothbrush after every meal.  Don't use dental floss if your platelet count is below 50,000. Your doctor or nurse will tell you if this is the case.  Use an oral swab or special soft toothbrush if your gums bleed during regular brushing.  Use mouthwash as directed. If you can't tolerate commercial mouthwash, use salt and baking soda to clean your mouth. Mix 1 teaspoon of salt and 1 teaspoon of baking soda into a glass of water. Swish and spit.  Call your doctor or return to this facility if you develop any of the following:   Sore throat   White patches in the mouth or throat   Fever of 100.4ºF (38ºC) or higher, or as directed by your healthcare provider  © 9244-9560 [employer], [address] [place] [address], [place] [address]. All rights reserved. This information is not intended as a substitute for professional medical care. Always follow your healthcare professional's   Support  "Groups/Classes    Support groups and classes are being offered at the   [facility] and [facility]!!    "Cooking with Cancer" (Nutrition Class):  Second Wednesday of each month   at noon at the Cancer Center.  Metastatic Support Group:  Third Tuesday of each month   at noon at the Cancer Center.  Next Steps Class/Group: Second and fourth Thursday of each month at noon at the Cancer Center.  [facility] (Breast Cancer Support Group): First Tuesday of each month   at 5:30pm at the [facility] location.  [employer]: [facility]: Second and third Tuesday of each month from 7:30am - 2pm.  [facility]: First and fourth Tuesday of each month from 7:30am - 2pm    If you are interested in attending or would like more information please ask our social workers or your nurse!  "

## 2020-08-25 NOTE — ASSESSMENT & PLAN NOTE
Metastatic pancreatic adenocarcinoma on palliative systemic therapy with gemcitabine.  Reviewed labs, no concerning cytopenia, will proceed with treatment.    Status post celiac nerve block for palliative pain with improvement per patient.  Refilled narcotics for pain.

## 2020-08-25 NOTE — PROGRESS NOTES
Subjective:       Patient ID: Ave Bunch is a 75 y.o. female.    Chief Complaint: Dehydration [E86.0]  HPI: We have an opportunity to see Ms. Ave Bunch in Hematology Oncology clinic at [facility] on 08/25/2020.  Ms. Ave Bunch is a 75 y.o. female with metastatic pancreatic cancer, currently on gemcitabine.  Has abdominal bloating, nausea, mid back pain, decreased appetite.  She noted improvement in appetite with Marinol.    Oncology History   Pancreatic carcinoma metastatic to intra-abdominal lymph node   8/3/2020 Initial Diagnosis    Pancreatic carcinoma metastatic to intra-abdominal lymph node     8/18/2020 -  Chemotherapy    Treatment Summary   Plan Name: OP PANC GEMCITABINE QW  Treatment Goal: Palliative  Status: Active  Start Date: 8/18/2020  End Date: 1/19/2021 (Planned)  Provider: Justin Onofre MD  Chemotherapy: gemcitabine 1,340 mg in sodium chloride 0.9% 285.3 mL chemo infusion, 1,000 mg/m2 = 1,340 mg, Intravenous, Clinic/HOD 1 time, 1 of 6 cycles  Administration: 1,340 mg (8/18/2020)       No past medical history on file.  No family history on file.  Social History     Socioeconomic History    Marital status: Single     Spouse name: Not on file    Number of children: Not on file    Years of education: Not on file    Highest education level: Not on file   Occupational History    Not on file   Social Needs    Financial resource strain: Not on file    Food insecurity     Worry: Not on file     Inability: Not on file    Transportation needs     Medical: Not on file     Non-medical: Not on file   Tobacco Use    Smoking status: Not on file   Substance and Sexual Activity    Alcohol use: Not on file    Drug use: Not on file    Sexual activity: Not on file   Lifestyle    Physical activity     Days per week: Not on file     Minutes per session: Not on file    Stress: Not on file   Relationships    Social connections     Talks on phone: Not on file      Gets together: Not on file     Attends Christian service: Not on file     Active member of club or organization: Not on file     Attends meetings of clubs or organizations: Not on file     Relationship status: Not on file   Other Topics Concern    Not on file   Social History Narrative    Not on file     No past surgical history on file.  Current Outpatient Medications   Medication Sig Dispense Refill    amLODIPine (NORVASC) 5 MG tablet Take 5 mg by mouth 2 (two) times a day.      azithromycin (Z-JUICE) 250 MG tablet Take 1 tablet (250 mg total) by mouth once daily. 30 tablet 2    dronabinoL (MARINOL) 2.5 MG capsule Take 1 capsule (2.5 mg total) by mouth 2 (two) times daily before meals. 60 capsule 1    glipiZIDE (GLUCOTROL) 10 MG TR24 Take 10 mg by mouth 2 (two) times a day.      HYDROcodone-acetaminophen (NORCO)  mg per tablet Take 1 tablet by mouth every 8 (eight) hours as needed for Pain. 60 tablet 0    levothyroxine 150 mcg Cap Take 150 mcg by mouth once daily.      lipase-protease-amylase 6,000-19,000-30,000 units (CREON) 6,000-19,000 -30,000 unit capsule Take 1 capsule by mouth 3 (three) times daily with meals. 90 capsule 11    megestroL (MEGACE) 400 mg/10 mL (40 mg/mL) Susp Take 20 mg by mouth once daily.      ondansetron (ZOFRAN) 8 MG tablet Take 8 mg by mouth every 8 (eight) hours as needed.      ondansetron (ZOFRAN-ODT) 8 MG TbDL Take 1 tablet (8 mg total) by mouth every 12 (twelve) hours as needed. 30 tablet 1    promethazine (PHENERGAN) 25 MG tablet Take 25 mg by mouth every 6 (six) hours as needed.      simvastatin (ZOCOR) 20 MG tablet Take 20 mg by mouth every evening.       No current facility-administered medications for this visit.      Facility-Administered Medications Ordered in Other Visits   Medication Dose Route Frequency Provider Last Rate Last Dose    gemcitabine 1,340 mg in sodium chloride 0.9% 285.3 mL chemo infusion  1,000 mg/m2 (Treatment Plan Recorded) Intravenous  1 time in Clinic/HOD Justin Onofre .6 mL/hr at 08/25/20 1117 1,340 mg at 08/25/20 1117    heparin, porcine (PF) 100 unit/mL injection flush 500 Units  500 Units Intravenous PRN Justin Onofre MD        sodium chloride 0.9% 250 mL flush bag   Intravenous 1 time in Clinic/HOD Justin Onofre MD        sodium chloride 0.9% 500 mL with potassium chloride 40 mEq infusion   Intravenous 1 time in Clinic/HOD Justin Onofre  mL/hr at 08/25/20 1043      sodium chloride 0.9% flush 10 mL  10 mL Intravenous PRN Justin Onofre MD   10 mL at 08/25/20 1042       Labs:  Lab Results   Component Value Date    WBC 4.34 08/25/2020    HGB 10.3 (L) 08/25/2020    HCT 31.4 (L) 08/25/2020    MCV 84 08/25/2020    PLT 99 (L) 08/25/2020     BMP  Lab Results   Component Value Date     08/25/2020    K 2.9 (L) 08/25/2020     08/25/2020    CO2 23 08/25/2020    BUN 7 (L) 08/25/2020    CREATININE 0.6 08/25/2020    CALCIUM 8.1 (L) 08/25/2020    ANIONGAP 10 08/25/2020    ESTGFRAFRICA >60 08/25/2020    EGFRNONAA >60 08/25/2020     Lab Results   Component Value Date    ALT 48 (H) 08/25/2020    AST 56 (H) 08/25/2020    ALKPHOS 206 (H) 08/25/2020    BILITOT 0.9 08/25/2020       No results found for: IRON, TIBC, FERRITIN, SATURATEDIRO  No results found for: XTMFDCHV14  No results found for: FOLATE  No results found for: TSH    I have reviewed the radiology reports and examined the scan/xray images.    Review of Systems   Constitutional: Negative.    HENT: Negative.    Eyes: Negative.    Respiratory: Negative.    Cardiovascular: Negative.    Gastrointestinal: Negative.    Endocrine: Negative.    Genitourinary: Negative.    Musculoskeletal: Negative.    Skin: Negative.    Allergic/Immunologic: Negative.    Neurological: Negative.    Hematological: Negative.    Psychiatric/Behavioral: Negative.      ECOG SCORE    2 - Capable of all selfcare but unable to carry out any work activities, active > 50% of hours             Objective:     Vitals:    08/25/20 0956   BP: 130/71   Pulse: 67   Resp: 16   Temp: 98.2 °F (36.8 °C)   Body mass index is 16.45 kg/m².  Physical Exam  Vitals signs and nursing note reviewed.   Constitutional:       Appearance: She is well-developed.   HENT:      Head: Normocephalic and atraumatic.   Eyes:      Conjunctiva/sclera: Conjunctivae normal.   Neck:      Musculoskeletal: Normal range of motion and neck supple.   Cardiovascular:      Rate and Rhythm: Normal rate and regular rhythm.   Pulmonary:      Effort: Pulmonary effort is normal.      Breath sounds: Normal breath sounds.   Abdominal:      General: Bowel sounds are normal.      Palpations: Abdomen is soft.   Musculoskeletal: Normal range of motion.   Skin:     General: Skin is warm and dry.   Neurological:      Mental Status: She is alert and oriented to person, place, and time.   Psychiatric:         Behavior: Behavior normal.         Thought Content: Thought content normal.         Judgment: Judgment normal.       PET CT Impression:     1. Findings consistent with the given history of pancreatic adenocarcinoma with a large FDG avid mass noted in the region of the pancreatic head.  Small subcentimeter focal area of uptake noted within the right hepatic lobe suspicious for metastatic disease.  Somewhat more diffuse uptake noted within the karen hepatis/gallbladder fossa which may be related to postsurgical change.  Tumor involvement would be difficult to exclude.  2. Bilateral adrenal gland uptake possibly related to a paraneoplastic syndrome or possibly adrenal hyperplasia as there is no discrete adrenal gland nodule to account for the uptake within either adrenal gland.    Assessment:      1. Dehydration    2. Pancreatic carcinoma metastatic to intra-abdominal lymph node    3. Weight loss    4. Hypokalemia           Plan:     Pancreatic carcinoma metastatic to intra-abdominal lymph node  Metastatic pancreatic adenocarcinoma on palliative  systemic therapy with gemcitabine.  Reviewed labs, no concerning cytopenia, will proceed with treatment.    Status post celiac nerve block for palliative pain with improvement per patient.  Refilled narcotics for pain.    Weight loss  Due to decreased appetite.  Improving with Marinol per patient.  Encourage more frequent small meals throughout the day.  Continue Zithromax for gastro paresis    Hypokalemia  Will administer IV fluids with electrolytes.      Justin Onofre MD

## 2020-08-25 NOTE — ASSESSMENT & PLAN NOTE
Due to decreased appetite.  Improving with Marinol per patient.  Encourage more frequent small meals throughout the day.  Continue Zithromax for gastro paresis

## 2020-08-25 NOTE — PROGRESS NOTES
Errol provided patient and daughter with [employer]reeze. Errol reports to tell her if she likes it and she will order her another case. Errol will continue to f/u.

## 2020-09-01 ENCOUNTER — TELEPHONE (OUTPATIENT)
Dept: HEMATOLOGY/ONCOLOGY | Facility: CLINIC | Age: 75
End: 2020-09-01

## 2020-09-01 ENCOUNTER — INFUSION (OUTPATIENT)
Dept: INFUSION THERAPY | Facility: HOSPITAL | Age: 75
End: 2020-09-01
Attending: NURSE PRACTITIONER
Payer: MEDICARE

## 2020-09-01 ENCOUNTER — OFFICE VISIT (OUTPATIENT)
Dept: HEMATOLOGY/ONCOLOGY | Facility: CLINIC | Age: 75
End: 2020-09-01
Payer: MEDICARE

## 2020-09-01 VITALS
BODY MASS INDEX: 16.6 KG/M2 | HEART RATE: 60 BPM | OXYGEN SATURATION: 98 % | TEMPERATURE: 99 F | RESPIRATION RATE: 18 BRPM | WEIGHT: 87.94 LBS | HEIGHT: 61 IN | DIASTOLIC BLOOD PRESSURE: 69 MMHG | SYSTOLIC BLOOD PRESSURE: 130 MMHG

## 2020-09-01 VITALS
SYSTOLIC BLOOD PRESSURE: 120 MMHG | DIASTOLIC BLOOD PRESSURE: 69 MMHG | HEART RATE: 63 BPM | TEMPERATURE: 98 F | WEIGHT: 87.94 LBS | BODY MASS INDEX: 16.6 KG/M2 | HEIGHT: 61 IN | OXYGEN SATURATION: 93 %

## 2020-09-01 DIAGNOSIS — D69.6 THROMBOCYTOPENIA: ICD-10-CM

## 2020-09-01 DIAGNOSIS — C77.2 PANCREATIC CARCINOMA METASTATIC TO INTRA-ABDOMINAL LYMPH NODE: ICD-10-CM

## 2020-09-01 DIAGNOSIS — E83.42 HYPOMAGNESEMIA: ICD-10-CM

## 2020-09-01 DIAGNOSIS — D61.818 PANCYTOPENIA: ICD-10-CM

## 2020-09-01 DIAGNOSIS — R23.3 PETECHIAE: Primary | ICD-10-CM

## 2020-09-01 DIAGNOSIS — E87.6 HYPOKALEMIA: ICD-10-CM

## 2020-09-01 DIAGNOSIS — C25.9 PANCREATIC CARCINOMA METASTATIC TO INTRA-ABDOMINAL LYMPH NODE: ICD-10-CM

## 2020-09-01 DIAGNOSIS — E86.0 DEHYDRATION: Primary | ICD-10-CM

## 2020-09-01 PROCEDURE — 99215 OFFICE O/P EST HI 40 MIN: CPT | Mod: S$PBB,,, | Performed by: NURSE PRACTITIONER

## 2020-09-01 PROCEDURE — 99999 PR PBB SHADOW E&M-EST. PATIENT-LVL III: CPT | Mod: PBBFAC,,, | Performed by: NURSE PRACTITIONER

## 2020-09-01 PROCEDURE — 99213 OFFICE O/P EST LOW 20 MIN: CPT | Mod: PBBFAC,25 | Performed by: NURSE PRACTITIONER

## 2020-09-01 PROCEDURE — 96360 HYDRATION IV INFUSION INIT: CPT

## 2020-09-01 PROCEDURE — 96367 TX/PROPH/DG ADDL SEQ IV INF: CPT

## 2020-09-01 PROCEDURE — 25000003 PHARM REV CODE 250: Performed by: NURSE PRACTITIONER

## 2020-09-01 PROCEDURE — 96365 THER/PROPH/DIAG IV INF INIT: CPT

## 2020-09-01 PROCEDURE — 96361 HYDRATE IV INFUSION ADD-ON: CPT

## 2020-09-01 PROCEDURE — 99215 PR OFFICE/OUTPT VISIT, EST, LEVL V, 40-54 MIN: ICD-10-PCS | Mod: S$PBB,,, | Performed by: NURSE PRACTITIONER

## 2020-09-01 PROCEDURE — 63600175 PHARM REV CODE 636 W HCPCS: Performed by: NURSE PRACTITIONER

## 2020-09-01 PROCEDURE — A4216 STERILE WATER/SALINE, 10 ML: HCPCS | Performed by: NURSE PRACTITIONER

## 2020-09-01 PROCEDURE — 96375 TX/PRO/DX INJ NEW DRUG ADDON: CPT

## 2020-09-01 PROCEDURE — 99999 PR PBB SHADOW E&M-EST. PATIENT-LVL III: ICD-10-PCS | Mod: PBBFAC,,, | Performed by: NURSE PRACTITIONER

## 2020-09-01 RX ORDER — ONDANSETRON 2 MG/ML
8 INJECTION INTRAMUSCULAR; INTRAVENOUS
Status: CANCELLED | OUTPATIENT
Start: 2020-09-01

## 2020-09-01 RX ORDER — HEPARIN 100 UNIT/ML
500 SYRINGE INTRAVENOUS
Status: CANCELLED | OUTPATIENT
Start: 2020-09-01

## 2020-09-01 RX ORDER — ONDANSETRON 2 MG/ML
8 INJECTION INTRAMUSCULAR; INTRAVENOUS
Status: COMPLETED | OUTPATIENT
Start: 2020-09-01 | End: 2020-09-01

## 2020-09-01 RX ORDER — SODIUM CHLORIDE 0.9 % (FLUSH) 0.9 %
10 SYRINGE (ML) INJECTION
Status: CANCELLED | OUTPATIENT
Start: 2020-09-01

## 2020-09-01 RX ORDER — HEPARIN 100 UNIT/ML
500 SYRINGE INTRAVENOUS
Status: DISCONTINUED | OUTPATIENT
Start: 2020-09-01 | End: 2020-09-01 | Stop reason: HOSPADM

## 2020-09-01 RX ORDER — MAGNESIUM SULFATE 1 G/100ML
1 INJECTION INTRAVENOUS ONCE
Status: COMPLETED | OUTPATIENT
Start: 2020-09-01 | End: 2020-09-01

## 2020-09-01 RX ORDER — SODIUM CHLORIDE 0.9 % (FLUSH) 0.9 %
10 SYRINGE (ML) INJECTION
Status: DISCONTINUED | OUTPATIENT
Start: 2020-09-01 | End: 2020-09-01 | Stop reason: HOSPADM

## 2020-09-01 RX ADMIN — MAGNESIUM SULFATE HEPTAHYDRATE 1 G: 1 INJECTION, SOLUTION INTRAVENOUS at 01:09

## 2020-09-01 RX ADMIN — ONDANSETRON HYDROCHLORIDE 8 MG: 2 INJECTION INTRAMUSCULAR; INTRAVENOUS at 01:09

## 2020-09-01 RX ADMIN — HEPARIN 500 UNITS: 100 SYRINGE at 05:09

## 2020-09-01 RX ADMIN — POTASSIUM CHLORIDE: 149 INJECTION, SOLUTION, CONCENTRATE INTRAVENOUS at 01:09

## 2020-09-01 RX ADMIN — Medication 10 ML: at 01:09

## 2020-09-01 NOTE — PROGRESS NOTES
Subjective:      Patient ID: Ave Bunch is a 75 y.o. female.    Chief Complaint: rash to right lower leg    HPI:  Patient is a 75 year old female who is accompanied by her daughter who presents today for review of labs and next dose of maintenance Gemzar for treatment of metastatic pancreatic cancer.   She states that she developed a red non itchy rash to her lower right leg Saturday evening.  She has chronic diarrhea and decreased appetite.  She has pancytopenia with a platelet count of 24 K.  She denies hematuria, melena, hematochezia, epistaxis, bleeding gums, or unusual bruising.      Social History     Socioeconomic History    Marital status: Single     Spouse name: Not on file    Number of children: Not on file    Years of education: Not on file    Highest education level: Not on file   Occupational History    Not on file   Social Needs    Financial resource strain: Not on file    Food insecurity     Worry: Not on file     Inability: Not on file    Transportation needs     Medical: Not on file     Non-medical: Not on file   Tobacco Use    Smoking status: Never Smoker   Substance and Sexual Activity    Alcohol use: Never     Frequency: Never    Drug use: Never    Sexual activity: Not Currently   Lifestyle    Physical activity     Days per week: Not on file     Minutes per session: Not on file    Stress: Not on file   Relationships    Social connections     Talks on phone: Not on file     Gets together: Not on file     Attends Gnosticism service: Not on file     Active member of club or organization: Not on file     Attends meetings of clubs or organizations: Not on file     Relationship status: Not on file   Other Topics Concern    Not on file   Social History Narrative    Not on file       History reviewed. No pertinent family history.    History reviewed. No pertinent surgical history.    History reviewed. No pertinent past medical history.    Review of Systems   Constitutional:  Positive for appetite change and fatigue. Negative for chills and fever.   HENT: Negative.    Eyes: Negative.    Respiratory: Negative.    Cardiovascular: Negative.    Gastrointestinal: Positive for diarrhea. Negative for anal bleeding and blood in stool.   Endocrine: Negative.    Genitourinary: Negative.    Musculoskeletal: Negative.    Skin: Positive for rash (to right lower leg).   Allergic/Immunologic: Negative.    Neurological: Negative.    Hematological: Bruises/bleeds easily.   Psychiatric/Behavioral: Negative.           Medication List with Changes/Refills   Current Medications    AMLODIPINE (NORVASC) 5 MG TABLET    Take 5 mg by mouth 2 (two) times a day.    AZITHROMYCIN (Z-JUICE) 250 MG TABLET    Take 1 tablet (250 mg total) by mouth once daily.    DRONABINOL (MARINOL) 2.5 MG CAPSULE    Take 1 capsule (2.5 mg total) by mouth 2 (two) times daily before meals.    GLIPIZIDE (GLUCOTROL) 10 MG TR24    Take 10 mg by mouth 2 (two) times a day.    HYDROCODONE-ACETAMINOPHEN (NORCO)  MG PER TABLET    Take 1 tablet by mouth every 8 (eight) hours as needed for Pain.    LEVOTHYROXINE 150 MCG CAP    Take 150 mcg by mouth once daily.    LIPASE-PROTEASE-AMYLASE 6,000-19,000-30,000 UNITS (CREON) 6,000-19,000 -30,000 UNIT CAPSULE    Take 1 capsule by mouth 3 (three) times daily with meals.    MEGESTROL (MEGACE) 400 MG/10 ML (40 MG/ML) SUSP    Take 20 mg by mouth once daily.    ONDANSETRON (ZOFRAN) 8 MG TABLET    Take 8 mg by mouth every 8 (eight) hours as needed.    ONDANSETRON (ZOFRAN-ODT) 8 MG TBDL    Take 1 tablet (8 mg total) by mouth every 12 (twelve) hours as needed.    PROMETHAZINE (PHENERGAN) 25 MG TABLET    Take 25 mg by mouth every 6 (six) hours as needed.    SIMVASTATIN (ZOCOR) 20 MG TABLET    Take 20 mg by mouth every evening.        Objective:     Vitals:    09/01/20 1037   BP: 120/69   Pulse: 63   Temp: 98.3 °F (36.8 °C)       Physical Exam  Vitals signs reviewed.   Constitutional:       Appearance: Normal  appearance. She is ill-appearing.   HENT:      Head: Normocephalic and atraumatic.   Eyes:      Extraocular Movements: Extraocular movements intact.   Neck:      Musculoskeletal: Normal range of motion.   Cardiovascular:      Rate and Rhythm: Normal rate and regular rhythm.      Heart sounds: Normal heart sounds, S1 normal and S2 normal.   Pulmonary:      Effort: Pulmonary effort is normal.      Breath sounds: Normal breath sounds.   Abdominal:      General: Abdomen is flat. There is no distension.   Musculoskeletal: Normal range of motion.   Skin:     General: Skin is warm.      Coloration: Skin is pale.      Findings: Petechiae present.             Comments: To right lower leg   Neurological:      General: No focal deficit present.      Mental Status: She is alert and oriented to person, place, and time.   Psychiatric:         Attention and Perception: Attention normal.         Mood and Affect: Mood normal.         Speech: Speech is delayed.         Behavior: Behavior normal.         Thought Content: Thought content normal.         Cognition and Memory: Cognition normal.         Judgment: Judgment normal.         Assessment:     Problem List Items Addressed This Visit        Renal/    Hypokalemia    Relevant Orders    Magnesium (Completed)    Comprehensive metabolic panel    Hypomagnesemia    Relevant Orders    Comprehensive metabolic panel    Magnesium       Oncology    Pancreatic carcinoma metastatic to intra-abdominal lymph node      Other Visit Diagnoses     Petechiae    -  Primary    Thrombocytopenia        Relevant Orders    CBC auto differential    Pancytopenia        Relevant Orders    CBC auto differential        Lab Results   Component Value Date    WBC 3.69 (L) 09/01/2020    HGB 8.6 (L) 09/01/2020    HCT 26.2 (L) 09/01/2020    MCV 85 09/01/2020    PLT 24 (LL) 09/01/2020       BMP  Lab Results   Component Value Date     09/01/2020    K 2.6 (LL) 09/01/2020     09/01/2020    CO2 24 09/01/2020     BUN 7 (L) 09/01/2020    CREATININE 0.6 09/01/2020    CALCIUM 7.9 (L) 09/01/2020    ANIONGAP 10 09/01/2020    ESTGFRAFRICA >60 09/01/2020    EGFRNONAA >60 09/01/2020     Lab Results   Component Value Date    ALT 41 09/01/2020    AST 49 (H) 09/01/2020    ALKPHOS 160 (H) 09/01/2020    BILITOT 0.6 09/01/2020       Plan:   Petechiae  -     Cancel: Pathologist Interpretation Differential; Future; Expected date: 09/01/2020  -     Cancel: CBC auto differential; Future; Expected date: 09/01/2020  -     Cancel: Comprehensive metabolic panel; Future; Expected date: 09/01/2020    Thrombocytopenia  -     Cancel: Pathologist Interpretation Differential; Future; Expected date: 09/01/2020  -     Cancel: CBC auto differential; Future; Expected date: 09/01/2020  -     Cancel: Comprehensive metabolic panel; Future; Expected date: 09/01/2020  -     CBC auto differential; Future; Expected date: 09/01/2020    Pancytopenia  -     Cancel: Pathologist Interpretation Differential; Future; Expected date: 09/01/2020  -     Cancel: CBC auto differential; Future; Expected date: 09/01/2020  -     Cancel: Comprehensive metabolic panel; Future; Expected date: 09/01/2020  -     CBC auto differential; Future; Expected date: 09/01/2020    Hypokalemia  -     Magnesium; Future; Expected date: 09/01/2020  -     Cancel: Comprehensive metabolic panel; Future; Expected date: 09/01/2020  -     Comprehensive metabolic panel; Future; Expected date: 09/01/2020    Hypomagnesemia  -     Comprehensive metabolic panel; Future; Expected date: 09/01/2020  -     Magnesium; Future; Expected date: 09/01/2020    Pancreatic carcinoma metastatic to intra-abdominal lymph node    Hold Gemzar today.  Ask pathologist to review peripheral smear to assess for schistocytes - rule out TTP/Hemolytic uremic syndrome.  Kidney function is normal.  Potassium is 2.6.  Will give potassium 40 meq in 500 ml NS over 4 hours.  Mag also low at 1.2.  Will add 1 gram magnesium to be given IV  as well.    We discussed potential for bleeding with low platelet count and she knows to seek medical attention for any unusual spontaneous bleeding or uncontrolled bleeding.      Update:  Per Cuca Sandoval, pathologist no schistocytes found on peripheral smear.       F/u in one week with cbc, cmp, magnesium see Dr. Onofre, nydia Gemzar.    Collaborating Provider:  Dr. Pineda Rutherford    Thank You,  THERESA Fernandez-C

## 2020-09-01 NOTE — DISCHARGE INSTRUCTIONS
[facility]  [address] [address]  [address]  [phone] phone     [phone] fax  Hours of Operation: Monday- Friday 8:00am- 5:00pm  After hours phone  [phone]  Hematology / Oncology Physicians on call      Dr. Ermias Londono, NATALIA Tidwell NP Tyesha Taylor, NP    Please call with any concerns regarding your appointment today.FALL PREVENTION   Falls often occur due to slipping, tripping or losing your balance. Here are ways to reduce your risk of falling again.   Was there anything that caused your fall that can be fixed, removed or replaced?   Make your home safe by keeping walkways clear of objects you may trip over.   Use non-slip pads under rugs.   Do not walk in poorly lit areas.   Do not stand on chairs or wobbly ladders.   Use caution when reaching overhead or looking upward. This position can cause a loss of balance.   Be sure your shoes fit properly, have non-slip bottoms and are in good condition.   Be cautious when going up and down stairs, curbs, and when walking on uneven sidewalks.   If your balance is poor, consider using a cane or walker.   If your fall was related to alcohol use, stop or limit alcohol intake.   If your fall was related to use of sleeping medicines, talk to your doctor about this. You may need to reduce your dosage at bedtime if you awaken during the night to go to the bathroom.   To reduce the need for nighttime bathroom trips:   Avoid drinking fluids for several hours before going to bed   Empty your bladder before going to bed   Men can keep a urinal at the bedside   © 5979-7143 [employer] [facility], [address] [place] [address], [place] [address]. All rights reserved. This information is not intended as a substitute for professional medical care. Always follow your healthcare professional's instructions.

## 2020-09-08 ENCOUNTER — OFFICE VISIT (OUTPATIENT)
Dept: HEMATOLOGY/ONCOLOGY | Facility: CLINIC | Age: 75
End: 2020-09-08
Payer: MEDICARE

## 2020-09-08 ENCOUNTER — INFUSION (OUTPATIENT)
Dept: INFUSION THERAPY | Facility: HOSPITAL | Age: 75
End: 2020-09-08
Attending: INTERNAL MEDICINE
Payer: MEDICARE

## 2020-09-08 VITALS
WEIGHT: 84.88 LBS | RESPIRATION RATE: 16 BRPM | SYSTOLIC BLOOD PRESSURE: 113 MMHG | HEIGHT: 61 IN | HEART RATE: 62 BPM | TEMPERATURE: 98 F | BODY MASS INDEX: 16.02 KG/M2 | DIASTOLIC BLOOD PRESSURE: 67 MMHG | OXYGEN SATURATION: 96 %

## 2020-09-08 VITALS
TEMPERATURE: 98 F | DIASTOLIC BLOOD PRESSURE: 61 MMHG | OXYGEN SATURATION: 97 % | BODY MASS INDEX: 16.02 KG/M2 | HEIGHT: 61 IN | HEART RATE: 54 BPM | SYSTOLIC BLOOD PRESSURE: 108 MMHG | WEIGHT: 84.88 LBS | RESPIRATION RATE: 16 BRPM

## 2020-09-08 DIAGNOSIS — E87.6 HYPOKALEMIA: Primary | ICD-10-CM

## 2020-09-08 DIAGNOSIS — R11.0 NAUSEA: ICD-10-CM

## 2020-09-08 DIAGNOSIS — C25.9 PANCREATIC CARCINOMA METASTATIC TO INTRA-ABDOMINAL LYMPH NODE: ICD-10-CM

## 2020-09-08 DIAGNOSIS — C77.2 PANCREATIC CARCINOMA METASTATIC TO INTRA-ABDOMINAL LYMPH NODE: ICD-10-CM

## 2020-09-08 PROCEDURE — 25000003 PHARM REV CODE 250: Performed by: NURSE PRACTITIONER

## 2020-09-08 PROCEDURE — A4216 STERILE WATER/SALINE, 10 ML: HCPCS | Performed by: NURSE PRACTITIONER

## 2020-09-08 PROCEDURE — 96361 HYDRATE IV INFUSION ADD-ON: CPT

## 2020-09-08 PROCEDURE — 99215 OFFICE O/P EST HI 40 MIN: CPT | Mod: S$PBB,,, | Performed by: NURSE PRACTITIONER

## 2020-09-08 PROCEDURE — 96375 TX/PRO/DX INJ NEW DRUG ADDON: CPT

## 2020-09-08 PROCEDURE — 96365 THER/PROPH/DIAG IV INF INIT: CPT

## 2020-09-08 PROCEDURE — 99999 PR PBB SHADOW E&M-EST. PATIENT-LVL IV: ICD-10-PCS | Mod: PBBFAC,,, | Performed by: NURSE PRACTITIONER

## 2020-09-08 PROCEDURE — 96360 HYDRATION IV INFUSION INIT: CPT

## 2020-09-08 PROCEDURE — 99999 PR PBB SHADOW E&M-EST. PATIENT-LVL IV: CPT | Mod: PBBFAC,,, | Performed by: NURSE PRACTITIONER

## 2020-09-08 PROCEDURE — 99215 PR OFFICE/OUTPT VISIT, EST, LEVL V, 40-54 MIN: ICD-10-PCS | Mod: S$PBB,,, | Performed by: NURSE PRACTITIONER

## 2020-09-08 PROCEDURE — 96366 THER/PROPH/DIAG IV INF ADDON: CPT

## 2020-09-08 PROCEDURE — 63600175 PHARM REV CODE 636 W HCPCS: Performed by: NURSE PRACTITIONER

## 2020-09-08 PROCEDURE — 96374 THER/PROPH/DIAG INJ IV PUSH: CPT

## 2020-09-08 PROCEDURE — 99214 OFFICE O/P EST MOD 30 MIN: CPT | Mod: PBBFAC,25 | Performed by: NURSE PRACTITIONER

## 2020-09-08 RX ORDER — SODIUM CHLORIDE 0.9 % (FLUSH) 0.9 %
10 SYRINGE (ML) INJECTION
Status: CANCELLED | OUTPATIENT
Start: 2020-09-08

## 2020-09-08 RX ORDER — SODIUM CHLORIDE 0.9 % (FLUSH) 0.9 %
10 SYRINGE (ML) INJECTION
Status: DISCONTINUED | OUTPATIENT
Start: 2020-09-08 | End: 2020-09-08 | Stop reason: HOSPADM

## 2020-09-08 RX ORDER — LEVOTHYROXINE SODIUM 150 UG/1
150 TABLET ORAL DAILY
COMMUNITY
Start: 2020-09-02

## 2020-09-08 RX ORDER — POTASSIUM CHLORIDE 20 MEQ/1
20 TABLET, EXTENDED RELEASE ORAL 2 TIMES DAILY
Qty: 30 TABLET | Refills: 1 | Status: SHIPPED | OUTPATIENT
Start: 2020-09-08

## 2020-09-08 RX ORDER — HEPARIN 100 UNIT/ML
500 SYRINGE INTRAVENOUS
Status: CANCELLED | OUTPATIENT
Start: 2020-09-08

## 2020-09-08 RX ORDER — HEPARIN 100 UNIT/ML
500 SYRINGE INTRAVENOUS
Status: DISCONTINUED | OUTPATIENT
Start: 2020-09-08 | End: 2020-09-08 | Stop reason: HOSPADM

## 2020-09-08 RX ORDER — ONDANSETRON 2 MG/ML
4 INJECTION INTRAMUSCULAR; INTRAVENOUS ONCE
Status: CANCELLED
Start: 2020-09-08

## 2020-09-08 RX ORDER — ONDANSETRON 2 MG/ML
4 INJECTION INTRAMUSCULAR; INTRAVENOUS ONCE
Status: COMPLETED | OUTPATIENT
Start: 2020-09-08 | End: 2020-09-08

## 2020-09-08 RX ADMIN — HEPARIN 500 UNITS: 100 SYRINGE at 04:09

## 2020-09-08 RX ADMIN — SODIUM CHLORIDE, PRESERVATIVE FREE 10 ML: 5 INJECTION INTRAVENOUS at 12:09

## 2020-09-08 RX ADMIN — ONDANSETRON HYDROCHLORIDE 4 MG: 2 INJECTION INTRAMUSCULAR; INTRAVENOUS at 02:09

## 2020-09-08 RX ADMIN — POTASSIUM CHLORIDE: 149 INJECTION, SOLUTION, CONCENTRATE INTRAVENOUS at 12:09

## 2020-09-08 NOTE — DISCHARGE INSTRUCTIONS
[facility]  [address] [facility]  [address]  [phone] phone     [phone] fax  Hours of Operation: Monday- Friday 8:00am- 5:00pm  After hours phone  [phone]  Hematology / Oncology Physicians on call      Dr. Ermias Londono, NATALIA Tidwell NP Tyesha Taylor, NP    Please call with any concerns regarding your appointment today.FALL PREVENTION   Falls often occur due to slipping, tripping or losing your balance. Here are ways to reduce your risk of falling again.   Was there anything that caused your fall that can be fixed, removed or replaced?   Make your home safe by keeping walkways clear of objects you may trip over.   Use non-slip pads under rugs.   Do not walk in poorly lit areas.   Do not stand on chairs or wobbly ladders.   Use caution when reaching overhead or looking upward. This position can cause a loss of balance.   Be sure your shoes fit properly, have non-slip bottoms and are in good condition.   Be cautious when going up and down stairs, curbs, and when walking on uneven sidewalks.   If your balance is poor, consider using a cane or walker.   If your fall was related to alcohol use, stop or limit alcohol intake.   If your fall was related to use of sleeping medicines, talk to your doctor about this. You may need to reduce your dosage at bedtime if you awaken during the night to go to the bathroom.   To reduce the need for nighttime bathroom trips:   Avoid drinking fluids for several hours before going to bed   Empty your bladder before going to bed   Men can keep a urinal at the bedside   © 6007-7921 [facility], [address] [place] [address], [place] [address]. All rights reserved. This information is not intended as a substitute for professional medical care. Always follow your healthcare professional's instructions.

## 2020-09-09 NOTE — PROGRESS NOTES
Subjective:      Patient ID: Ave Bunch is a 75 y.o. female.    Chief Complaint: rash to right lower leg    HPI:  Patient is a 75 year old female who is accompanied by her daughter who presents today for review of labs and next dose of maintenance Gemzar for treatment of metastatic pancreatic cancer.   Present last week with petechia to right lower leg and found with platelet count of 24 K.  Chemo was held.     She states that for 2-3 days since her last visit she had 4-5 episodes of diarrhea bowel movements per day.  She states this resolved on Sunday and she has not had diarrhea since.  She denies vomiting or nausea.  She denies any signs or symptoms of infection.  She denies fever.  Platelet count has now normalized.; however she is currently neutropenic with ANC of 0.8.    Potassium continues to be low at 2.8.  Magnesium is normal today.    Social History     Socioeconomic History    Marital status: Single     Spouse name: Not on file    Number of children: Not on file    Years of education: Not on file    Highest education level: Not on file   Occupational History    Not on file   Social Needs    Financial resource strain: Not on file    Food insecurity     Worry: Not on file     Inability: Not on file    Transportation needs     Medical: Not on file     Non-medical: Not on file   Tobacco Use    Smoking status: Never Smoker   Substance and Sexual Activity    Alcohol use: Never     Frequency: Never    Drug use: Never    Sexual activity: Not Currently   Lifestyle    Physical activity     Days per week: Not on file     Minutes per session: Not on file    Stress: Not on file   Relationships    Social connections     Talks on phone: Not on file     Gets together: Not on file     Attends Amish service: Not on file     Active member of club or organization: Not on file     Attends meetings of clubs or organizations: Not on file     Relationship status: Not on file   Other Topics Concern     Not on file   Social History Narrative    Not on file       History reviewed. No pertinent family history.    History reviewed. No pertinent surgical history.    History reviewed. No pertinent past medical history.    Review of Systems   Constitutional: Positive for appetite change. Negative for chills, fatigue and fever.   HENT: Negative.    Eyes: Negative.    Respiratory: Negative.    Cardiovascular: Negative.    Gastrointestinal: Negative.  Negative for anal bleeding, blood in stool and diarrhea.   Endocrine: Negative.    Genitourinary: Negative.    Musculoskeletal: Negative.    Skin: Negative.    Allergic/Immunologic: Positive for immunocompromised state.   Neurological: Negative.    Hematological: Negative.  Does not bruise/bleed easily.   Psychiatric/Behavioral: Negative.           Medication List with Changes/Refills   New Medications    POTASSIUM CHLORIDE SA (K-DUR,KLOR-CON) 20 MEQ TABLET    Take 1 tablet (20 mEq total) by mouth 2 (two) times daily.   Current Medications    AMLODIPINE (NORVASC) 5 MG TABLET    Take 5 mg by mouth 2 (two) times a day.    AZITHROMYCIN (Z-JUICE) 250 MG TABLET    Take 1 tablet (250 mg total) by mouth once daily.    DRONABINOL (MARINOL) 2.5 MG CAPSULE    Take 1 capsule (2.5 mg total) by mouth 2 (two) times daily before meals.    GLIPIZIDE (GLUCOTROL) 10 MG TR24    Take 10 mg by mouth 2 (two) times a day.    HYDROCODONE-ACETAMINOPHEN (NORCO)  MG PER TABLET    Take 1 tablet by mouth every 8 (eight) hours as needed for Pain.    LEVOTHYROXINE (SYNTHROID) 150 MCG TABLET    Take 150 mcg by mouth once daily.    LEVOTHYROXINE 150 MCG CAP    Take 150 mcg by mouth once daily.    LIPASE-PROTEASE-AMYLASE 6,000-19,000-30,000 UNITS (CREON) 6,000-19,000 -30,000 UNIT CAPSULE    Take 1 capsule by mouth 3 (three) times daily with meals.    MEGESTROL (MEGACE) 400 MG/10 ML (40 MG/ML) SUSP    Take 20 mg by mouth once daily.    ONDANSETRON (ZOFRAN) 8 MG TABLET    Take 8 mg by mouth every 8  (eight) hours as needed.    ONDANSETRON (ZOFRAN-ODT) 8 MG TBDL    Take 1 tablet (8 mg total) by mouth every 12 (twelve) hours as needed.    PROMETHAZINE (PHENERGAN) 25 MG TABLET    Take 25 mg by mouth every 6 (six) hours as needed.    SIMVASTATIN (ZOCOR) 20 MG TABLET    Take 20 mg by mouth every evening.        Objective:     Vitals:    09/08/20 1037   BP: 113/67   Pulse: 62   Resp: 16   Temp: 98.2 °F (36.8 °C)       Physical Exam  Vitals signs reviewed.   Constitutional:       Appearance: Normal appearance. She is ill-appearing.   HENT:      Head: Normocephalic and atraumatic.   Eyes:      Extraocular Movements: Extraocular movements intact.   Neck:      Musculoskeletal: Normal range of motion.   Cardiovascular:      Rate and Rhythm: Normal rate and regular rhythm.      Heart sounds: Normal heart sounds, S1 normal and S2 normal.   Pulmonary:      Effort: Pulmonary effort is normal.      Breath sounds: Normal breath sounds.   Abdominal:      General: Abdomen is flat. There is no distension.   Musculoskeletal: Normal range of motion.   Skin:     General: Skin is warm.      Coloration: Skin is pale.      Findings: Petechiae present.             Comments: To right lower leg   Neurological:      General: No focal deficit present.      Mental Status: She is alert and oriented to person, place, and time.   Psychiatric:         Attention and Perception: Attention normal.         Mood and Affect: Mood normal.         Speech: Speech is delayed.         Behavior: Behavior normal.         Thought Content: Thought content normal.         Cognition and Memory: Cognition normal.         Judgment: Judgment normal.         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    Relevant Orders    CBC auto differential    Comprehensive metabolic panel       GI    Nausea        Lab Results   Component Value Date    WBC 2.81 (L) 09/08/2020    HGB 9.6 (L) 09/08/2020    HCT 29.9 (L) 09/08/2020    MCV 87 09/08/2020     09/08/2020       BMP  Lab Results   Component Value Date     09/08/2020    K 2.8 (L) 09/08/2020     09/08/2020    CO2 23 09/08/2020    BUN 10 09/08/2020    CREATININE 0.6 09/08/2020    CALCIUM 8.0 (L) 09/08/2020    ANIONGAP 13 09/08/2020    ESTGFRAFRICA >60 09/08/2020    EGFRNONAA >60 09/08/2020     Lab Results   Component Value Date    ALT 44 09/08/2020    AST 56 (H) 09/08/2020    ALKPHOS 176 (H) 09/08/2020    BILITOT 0.6 09/08/2020       Plan:   Hypokalemia  -     potassium chloride SA (K-DUR,KLOR-CON) 20 MEQ tablet; Take 1 tablet (20 mEq total) by mouth 2 (two) times daily.  Dispense: 30 tablet; Refill: 1  -     CBC auto differential; Future; Expected date: 09/08/2020  -     Comprehensive metabolic panel; Future; Expected date: 09/08/2020    Pancreatic carcinoma metastatic to intra-abdominal lymph node  -     CBC auto differential; Future; Expected date: 09/08/2020  -     Comprehensive metabolic panel; Future; Expected date: 09/08/2020    Nausea    Hold Gemzar today.  Due to low potassium will give 40 mEq IV potassium in 1 L of normal saline today.  We will start her on potassium 20 mEq p.o. daily.  She will follow-up in 1 week with CBC, CMP, see Dr. Onofre, next dose Gemzar.  May need dose adjustment in the future since she has had to have postponement of chemo for 2 weeks due to cytopenias.    Zofran added to treatment plan today due to patient stating that she gets nauseated while receiving IV potassium.    Collaborating Provider:  Dr. Justin Onofre.      Thank You,  Abraham Londono, THERESA-SHYLA

## 2020-09-15 ENCOUNTER — DOCUMENTATION ONLY (OUTPATIENT)
Dept: HEMATOLOGY/ONCOLOGY | Facility: CLINIC | Age: 75
End: 2020-09-15

## 2020-09-15 ENCOUNTER — OFFICE VISIT (OUTPATIENT)
Dept: HEMATOLOGY/ONCOLOGY | Facility: CLINIC | Age: 75
End: 2020-09-15
Payer: MEDICARE

## 2020-09-15 ENCOUNTER — INFUSION (OUTPATIENT)
Dept: INFUSION THERAPY | Facility: HOSPITAL | Age: 75
End: 2020-09-15
Attending: INTERNAL MEDICINE
Payer: MEDICARE

## 2020-09-15 VITALS
HEIGHT: 61 IN | TEMPERATURE: 97 F | DIASTOLIC BLOOD PRESSURE: 72 MMHG | WEIGHT: 87.31 LBS | OXYGEN SATURATION: 96 % | HEART RATE: 71 BPM | BODY MASS INDEX: 16.48 KG/M2 | SYSTOLIC BLOOD PRESSURE: 112 MMHG | RESPIRATION RATE: 16 BRPM

## 2020-09-15 VITALS
WEIGHT: 87.31 LBS | HEART RATE: 63 BPM | SYSTOLIC BLOOD PRESSURE: 109 MMHG | DIASTOLIC BLOOD PRESSURE: 62 MMHG | RESPIRATION RATE: 16 BRPM | BODY MASS INDEX: 16.48 KG/M2 | HEIGHT: 61 IN | TEMPERATURE: 98 F | OXYGEN SATURATION: 100 %

## 2020-09-15 DIAGNOSIS — R53.81 DEBILITY: Primary | ICD-10-CM

## 2020-09-15 DIAGNOSIS — C25.9 PANCREATIC CARCINOMA METASTATIC TO INTRA-ABDOMINAL LYMPH NODE: ICD-10-CM

## 2020-09-15 DIAGNOSIS — C77.2 PANCREATIC CARCINOMA METASTATIC TO INTRA-ABDOMINAL LYMPH NODE: Primary | ICD-10-CM

## 2020-09-15 DIAGNOSIS — R63.4 WEIGHT LOSS: ICD-10-CM

## 2020-09-15 DIAGNOSIS — E87.6 HYPOKALEMIA: ICD-10-CM

## 2020-09-15 DIAGNOSIS — C25.9 PANCREATIC CARCINOMA METASTATIC TO INTRA-ABDOMINAL LYMPH NODE: Primary | ICD-10-CM

## 2020-09-15 DIAGNOSIS — C77.2 PANCREATIC CARCINOMA METASTATIC TO INTRA-ABDOMINAL LYMPH NODE: ICD-10-CM

## 2020-09-15 PROCEDURE — 99999 PR PBB SHADOW E&M-EST. PATIENT-LVL III: CPT | Mod: PBBFAC,,, | Performed by: INTERNAL MEDICINE

## 2020-09-15 PROCEDURE — 99213 OFFICE O/P EST LOW 20 MIN: CPT | Mod: PBBFAC,25 | Performed by: INTERNAL MEDICINE

## 2020-09-15 PROCEDURE — 63600175 PHARM REV CODE 636 W HCPCS: Performed by: INTERNAL MEDICINE

## 2020-09-15 PROCEDURE — 99215 PR OFFICE/OUTPT VISIT, EST, LEVL V, 40-54 MIN: ICD-10-PCS | Mod: 25,S$PBB,, | Performed by: INTERNAL MEDICINE

## 2020-09-15 PROCEDURE — 96367 TX/PROPH/DG ADDL SEQ IV INF: CPT

## 2020-09-15 PROCEDURE — 25000003 PHARM REV CODE 250: Performed by: INTERNAL MEDICINE

## 2020-09-15 PROCEDURE — 96413 CHEMO IV INFUSION 1 HR: CPT

## 2020-09-15 PROCEDURE — 99215 OFFICE O/P EST HI 40 MIN: CPT | Mod: 25,S$PBB,, | Performed by: INTERNAL MEDICINE

## 2020-09-15 PROCEDURE — 99999 PR PBB SHADOW E&M-EST. PATIENT-LVL III: ICD-10-PCS | Mod: PBBFAC,,, | Performed by: INTERNAL MEDICINE

## 2020-09-15 RX ORDER — HEPARIN 100 UNIT/ML
500 SYRINGE INTRAVENOUS
Status: DISCONTINUED | OUTPATIENT
Start: 2020-09-15 | End: 2020-09-15 | Stop reason: HOSPADM

## 2020-09-15 RX ORDER — HEPARIN 100 UNIT/ML
500 SYRINGE INTRAVENOUS
Status: CANCELLED | OUTPATIENT
Start: 2020-09-15

## 2020-09-15 RX ORDER — SODIUM CHLORIDE 0.9 % (FLUSH) 0.9 %
10 SYRINGE (ML) INJECTION
Status: CANCELLED | OUTPATIENT
Start: 2020-09-15

## 2020-09-15 RX ADMIN — HEPARIN 500 UNITS: 100 SYRINGE at 12:09

## 2020-09-15 RX ADMIN — DEXAMETHASONE SODIUM PHOSPHATE 10 MG: 4 INJECTION, SOLUTION INTRAMUSCULAR; INTRAVENOUS at 11:09

## 2020-09-15 RX ADMIN — GEMCITABINE HYDROCHLORIDE 1070 MG: 1 INJECTION, SOLUTION INTRAVENOUS at 11:09

## 2020-09-15 NOTE — PROGRESS NOTES
Subjective:       Patient ID: Ave Bunch is a 75 y.o. female.    Chief Complaint: Debility [R53.81]  HPI: We have an opportunity to see Ms. Ave Bunch in Hematology Oncology clinic at [facility] on 09/15/2020.  Ms. Ave Bunch is a 75 y.o. female with metastatic pancreatic cancer, currently on gemcitabine.  Has abdominal bloating, nausea, mid back pain, decreased appetite.  She noted improvement in appetite with Marinol.    Oncology History   Pancreatic carcinoma metastatic to intra-abdominal lymph node   8/3/2020 Initial Diagnosis    Pancreatic carcinoma metastatic to intra-abdominal lymph node     8/18/2020 -  Chemotherapy    Treatment Summary   Plan Name: OP PANC GEMCITABINE QW  Treatment Goal: Palliative  Status: Active  Start Date: 8/18/2020  End Date: 2/2/2021 (Planned)  Provider: Justin Onofre MD  Chemotherapy: gemcitabine 1,340 mg in sodium chloride 0.9% 285.3 mL chemo infusion, 1,000 mg/m2 = 1,340 mg, Intravenous, Clinic/HOD 1 time, 1 of 6 cycles  Dose modification: 800 mg/m2 (80 % of original dose 1,000 mg/m2, Cycle 1, Reason: Dose Not Tolerated)  Administration: 1,340 mg (8/18/2020), 1,340 mg (8/25/2020), 1,070 mg (9/15/2020)       History reviewed. No pertinent past medical history.  History reviewed. No pertinent family history.  Social History     Socioeconomic History    Marital status: Single     Spouse name: Not on file    Number of children: Not on file    Years of education: Not on file    Highest education level: Not on file   Occupational History    Not on file   Social Needs    Financial resource strain: Not on file    Food insecurity     Worry: Not on file     Inability: Not on file    Transportation needs     Medical: Not on file     Non-medical: Not on file   Tobacco Use    Smoking status: Never Smoker   Substance and Sexual Activity    Alcohol use: Never     Frequency: Never    Drug use: Never    Sexual activity: Not Currently    Lifestyle    Physical activity     Days per week: Not on file     Minutes per session: Not on file    Stress: Not on file   Relationships    Social connections     Talks on phone: Not on file     Gets together: Not on file     Attends Scientologist service: Not on file     Active member of club or organization: Not on file     Attends meetings of clubs or organizations: Not on file     Relationship status: Not on file   Other Topics Concern    Not on file   Social History Narrative    Not on file     History reviewed. No pertinent surgical history.  Current Outpatient Medications   Medication Sig Dispense Refill    amLODIPine (NORVASC) 5 MG tablet Take 5 mg by mouth 2 (two) times a day.      azithromycin (Z-JUICE) 250 MG tablet Take 1 tablet (250 mg total) by mouth once daily. 30 tablet 2    dronabinoL (MARINOL) 2.5 MG capsule Take 1 capsule (2.5 mg total) by mouth 2 (two) times daily before meals. 60 capsule 1    glipiZIDE (GLUCOTROL) 10 MG TR24 Take 10 mg by mouth 2 (two) times a day.      HYDROcodone-acetaminophen (NORCO)  mg per tablet Take 1 tablet by mouth every 8 (eight) hours as needed for Pain. 60 tablet 0    levothyroxine (SYNTHROID) 150 MCG tablet Take 150 mcg by mouth once daily.      levothyroxine 150 mcg Cap Take 150 mcg by mouth once daily.      lipase-protease-amylase 6,000-19,000-30,000 units (CREON) 6,000-19,000 -30,000 unit capsule Take 1 capsule by mouth 3 (three) times daily with meals. 90 capsule 11    megestroL (MEGACE) 400 mg/10 mL (40 mg/mL) Susp Take 20 mg by mouth once daily.      ondansetron (ZOFRAN) 8 MG tablet Take 8 mg by mouth every 8 (eight) hours as needed.      ondansetron (ZOFRAN-ODT) 8 MG TbDL Take 1 tablet (8 mg total) by mouth every 12 (twelve) hours as needed. 30 tablet 1    potassium chloride SA (K-DUR,KLOR-CON) 20 MEQ tablet Take 1 tablet (20 mEq total) by mouth 2 (two) times daily. 30 tablet 1    promethazine (PHENERGAN) 25 MG tablet Take 25 mg by mouth  every 6 (six) hours as needed.      simvastatin (ZOCOR) 20 MG tablet Take 20 mg by mouth every evening.       No current facility-administered medications for this visit.      Facility-Administered Medications Ordered in Other Visits   Medication Dose Route Frequency Provider Last Rate Last Dose    heparin, porcine (PF) 100 unit/mL injection flush 500 Units  500 Units Intravenous PRN Justin Onofre MD   500 Units at 09/15/20 1220       Labs:  Lab Results   Component Value Date    WBC 5.45 09/15/2020    HGB 9.7 (L) 09/15/2020    HCT 29.9 (L) 09/15/2020    MCV 89 09/15/2020     09/15/2020     BMP  Lab Results   Component Value Date     09/15/2020    K 4.1 09/15/2020     09/15/2020    CO2 20 (L) 09/15/2020    BUN 11 09/15/2020    CREATININE 0.6 09/15/2020    CALCIUM 8.0 (L) 09/15/2020    ANIONGAP 12 09/15/2020    ESTGFRAFRICA >60 09/15/2020    EGFRNONAA >60 09/15/2020     Lab Results   Component Value Date    ALT 45 (H) 09/15/2020    AST 69 (H) 09/15/2020    ALKPHOS 151 (H) 09/15/2020    BILITOT 0.5 09/15/2020       No results found for: IRON, TIBC, FERRITIN, SATURATEDIRO  No results found for: [id number]  No results found for: FOLATE  No results found for: TSH    I have reviewed the radiology reports and examined the scan/xray images.    Review of Systems   Constitutional: Negative.    HENT: Negative.    Eyes: Negative.    Respiratory: Negative.    Cardiovascular: Negative.    Gastrointestinal: Negative.    Endocrine: Negative.    Genitourinary: Negative.    Musculoskeletal: Negative.    Skin: Negative.    Allergic/Immunologic: Negative.    Neurological: Negative.    Hematological: Negative.    Psychiatric/Behavioral: Negative.      ECOG SCORE              Objective:     Vitals:    09/15/20 1013   BP: 112/72   Pulse: 71   Resp: 16   Temp: 97.1 °F (36.2 °C)   Body mass index is 16.5 kg/m².  Physical Exam  Vitals signs and nursing note reviewed.   Constitutional:       Appearance: She is  well-developed.   HENT:      Head: Normocephalic and atraumatic.   Eyes:      Conjunctiva/sclera: Conjunctivae normal.   Neck:      Musculoskeletal: Normal range of motion and neck supple.   Cardiovascular:      Rate and Rhythm: Normal rate and regular rhythm.   Pulmonary:      Effort: Pulmonary effort is normal.      Breath sounds: Normal breath sounds.   Abdominal:      General: Bowel sounds are normal.      Palpations: Abdomen is soft.   Musculoskeletal: Normal range of motion.   Skin:     General: Skin is warm and dry.   Neurological:      Mental Status: She is alert and oriented to person, place, and time.   Psychiatric:         Behavior: Behavior normal.         Thought Content: Thought content normal.         Judgment: Judgment normal.       PET CT Impression:     1. Findings consistent with the given history of pancreatic adenocarcinoma with a large FDG avid mass noted in the region of the pancreatic head.  Small subcentimeter focal area of uptake noted within the right hepatic lobe suspicious for metastatic disease.  Somewhat more diffuse uptake noted within the karen hepatis/gallbladder fossa which may be related to postsurgical change.  Tumor involvement would be difficult to exclude.  2. Bilateral adrenal gland uptake possibly related to a paraneoplastic syndrome or possibly adrenal hyperplasia as there is no discrete adrenal gland nodule to account for the uptake within either adrenal gland.    Assessment:      1. Debility    2. Pancreatic carcinoma metastatic to intra-abdominal lymph node    3. Weight loss    4. Hypokalemia           Plan:     Pancreatic carcinoma metastatic to intra-abdominal lymph node  Metastatic pancreatic adenocarcinoma on palliative systemic therapy with gemcitabine monotherapy.  Treatment held on 09/08/2020 due to rash and hypokalemia.  Was treated with potassium supplementation and IV hydration.  Presents today to resume treatment.  Reviewed labs, no concerning cytopenia, will  proceed with treatment with 20% reduction in Hans a dose.  Plan to see her back in 1 week with repeat labs or sooner if needed.    Will place order for palliative care service to discuss goals of care and advanced care plan.    Weight loss  Continue Marinol.  Encourage increase oral fluid intake.  Continue protein boost.    Hypokalemia  Resolved following administration of IV potassium.    Debility:  Script written for home wheelchair.      Justin Onofre MD

## 2020-09-15 NOTE — PROGRESS NOTES
Dr. Onofre requested SW meet with pt for assistance with wheelchair request. SW met with pt and her daughter briefly to describe process and verify demographic information to which daughter agreed to all info. SW provided SW contact card for future needs.     F/u by primary SWJoseline over the next few weeks.

## 2020-09-15 NOTE — ASSESSMENT & PLAN NOTE
Metastatic pancreatic adenocarcinoma on palliative systemic therapy with gemcitabine monotherapy.  Treatment held on 09/08/2020 due to rash and hypokalemia.  Was treated with potassium supplementation and IV hydration.  Presents today to resume treatment.  Reviewed labs, no concerning cytopenia, will proceed with treatment with 20% reduction in Hans a dose.  Plan to see her back in 1 week with repeat labs or sooner if needed.    Will place order for palliative care service to discuss goals of care and advanced care plan.

## 2020-09-17 ENCOUNTER — DOCUMENTATION ONLY (OUTPATIENT)
Dept: HEMATOLOGY/ONCOLOGY | Facility: CLINIC | Age: 75
End: 2020-09-17

## 2020-09-17 NOTE — NURSING
Spoke with patient over the phone, reviewed my role as nurse navigator and made sure she had my direct contact information. Pt states she is doing well and without needs or questions at present. She states her treatments have been going well. She knows to call with any questions/concerns.   Oncology Navigation   Intake  Date of Diagnosis: 20  Cancer Type: Pancreatic  MD Assigned: Dr. Justin Onofre  Internal / External Referral: External  Initial Nurse Navigator Contact: 20  Diagnosis to Initial Contact Timeline (days): 11 days  Contact Method: Pool basket  Date Worked: 20  Start of Treatment: 20  Diagnosis to Treat Timeline (days): 36 days     Treatment  Current Status: Active  Treatment Type(s): Chemotherapy  Chemotherapy Regimen: Gemzar          General Referrals: Social work  Social Work Referral Date: 20    Support Systems: Children  Concerns: None at this time     Acuity  Systemic Treatment - predicted or initiated: Chemotherapy regimen with multiple drugs (+1)  Treatment Tolerability: Has not started treatment yet/treatment fully completed and side effects resolved  ECO  Comorbidities in Medical History: 1  Support: 0  Verbalizes Financial Concerns: 0  Transportation: 0  Verbalizes the need for more education: 1  Other Factors (+1 for Each): 1 (travels from [place], [place])  Navigation Acuity: 6     Follow Up  Follow up in 6 weeks (on 10/27/2020) for receiving chemo.

## 2020-09-21 ENCOUNTER — PATIENT MESSAGE (OUTPATIENT)
Dept: HEMATOLOGY/ONCOLOGY | Facility: CLINIC | Age: 75
End: 2020-09-21

## 2020-09-24 ENCOUNTER — TELEPHONE (OUTPATIENT)
Dept: HEMATOLOGY/ONCOLOGY | Facility: CLINIC | Age: 75
End: 2020-09-24

## 2020-09-24 NOTE — TELEPHONE ENCOUNTER
SW intern called and left voice message for pts daughter regarding open ivan through cancer care. SW will follow up with daughter on how to apply for copay assistance ivan.

## 2020-09-28 NOTE — PROGRESS NOTES
Subjective:      DATE OF VISIT: 9/29/2020   ?   ?   Patient ID:?Ave Bunch is a 75 y.o. female.?? MR#: [id number]   ?   PRIMARY PROVIDER:  Dr. Onofre  ?   CHIEF COMPLAINT:  Follow-up?????   ?   ONCOLOGIC DIAGNOSIS:  Pancreatic carcinoma  ?   CURRENT TREATMENT:  Gemcitabine monotherapy      HPI    Today I have the pleasure meeting for the 1st time Ms. Mccormack a a 75-year-old woman with metastatic pancreatic carcinoma on gemcitabine.  She has had difficulty with poor appetite currently on dronabinol.  She last treatment 2 weeks ago.  Since treatment she has had gradually progressive lower extremity edema, left slightly worse than right.  Dyspnea on exertion is stable with no worsening during this time.  She was recommended emergency room visit for Doppler scan but did not comply.      Review of Systems    ?   A comprehensive 14-point review of systems was reviewed with patient and was negative other than as specified above.   ?     Objective:      Physical Exam      ?   Vitals:    09/29/20 1020   BP: 119/74   Pulse: 78   Temp: 98 °F (36.7 °C)      ?   ECOG:?3   General appearance:  Generally ill-appearing, frail, soft-spoken  Head, eyes, ears, nose, and throat: Oropharynx clear with moist mucous membranes.   Cardiovascular: Regular rate and rhythm, S1, S2, no audible murmurs.   Respiratory: Lungs clear to auscultation bilaterally.   Abdomen: nontender, nondistended.   Extremities: Warm, with 2+ pitting edema throughout shins bilaterally symmetric  Neurologic: Alert and oriented.  Slow, unstable gait.  Skin: No rashes, ecchymoses or petechial lesion, vitiligo.   Psychiatric: normal mood and affect, conversant and appropriate    ?   Laboratory:  ?   Lab Visit on 09/29/2020   Component Date Value Ref Range Status    WBC 09/29/2020 11.20  3.90 - 12.70 K/uL Final    RBC 09/29/2020 2.94* 4.00 - 5.40 M/uL Final    Hemoglobin 09/29/2020 8.6* 12.0 - 16.0 g/dL Final    Hematocrit 09/29/2020 26.3* 37.0 - 48.5  % Final    Mean Corpuscular Volume 09/29/2020 90  82 - 98 fL Final    Mean Corpuscular Hemoglobin 09/29/2020 29.3  27.0 - 31.0 pg Final    Mean Corpuscular Hemoglobin Conc 09/29/2020 32.7  32.0 - 36.0 g/dL Final    RDW 09/29/2020 19.7* 11.5 - 14.5 % Final    Platelets 09/29/2020 177  150 - 350 K/uL Final    MPV 09/29/2020 10.7  9.2 - 12.9 fL Final    Immature Granulocytes 09/29/2020 0.4  0.0 - 0.5 % Final    Gran # (ANC) 09/29/2020 9.1* 1.8 - 7.7 K/uL Final    Immature Grans (Abs) 09/29/2020 0.04  0.00 - 0.04 K/uL Final    Lymph # 09/29/2020 1.6  1.0 - 4.8 K/uL Final    Mono # 09/29/2020 0.4  0.3 - 1.0 K/uL Final    Eos # 09/29/2020 0.1  0.0 - 0.5 K/uL Final    Baso # 09/29/2020 0.02  0.00 - 0.20 K/uL Final    nRBC 09/29/2020 0  0 /100 WBC Final    Gran% 09/29/2020 80.9* 38.0 - 73.0 % Final    Lymph% 09/29/2020 14.4* 18.0 - 48.0 % Final    Mono% 09/29/2020 3.6* 4.0 - 15.0 % Final    Eosinophil% 09/29/2020 0.9  0.0 - 8.0 % Final    Basophil% 09/29/2020 0.2  0.0 - 1.9 % Final    Aniso 09/29/2020 Slight   Final    Poik 09/29/2020 Slight   Final    Poly 09/29/2020 Occasional   Final    Hypo 09/29/2020 Occasional   Final    Acanthocytes 09/29/2020 Present   Final    Schistocytes 09/29/2020 Present   Final    Differential Method 09/29/2020 Automated   Final    Sodium 09/29/2020 137  136 - 145 mmol/L Final    Potassium 09/29/2020 4.5  3.5 - 5.1 mmol/L Final    Chloride 09/29/2020 107  95 - 110 mmol/L Final    CO2 09/29/2020 19* 23 - 29 mmol/L Final    Glucose 09/29/2020 147* 70 - 110 mg/dL Final    BUN, Bld 09/29/2020 11  8 - 23 mg/dL Final    Creatinine 09/29/2020 0.7  0.5 - 1.4 mg/dL Final    Calcium 09/29/2020 7.7* 8.7 - 10.5 mg/dL Final    Total Protein 09/29/2020 5.5* 6.0 - 8.4 g/dL Final    Albumin 09/29/2020 2.1* 3.5 - 5.2 g/dL Final    Total Bilirubin 09/29/2020 0.7  0.1 - 1.0 mg/dL Final    Alkaline Phosphatase 09/29/2020 266* 55 - 135 U/L Final    AST 09/29/2020 61* 10 - 40  U/L Final    ALT 09/29/2020 45* 10 - 44 U/L Final    Anion Gap 09/29/2020 11  8 - 16 mmol/L Final    eGFR if African American 09/29/2020 >60  >60 mL/min/1.73 m^2 Final    eGFR if non African American 09/29/2020 >60  >60 mL/min/1.73 m^2 Final      ?   ?   Assessment/Plan:   Pancreatic carcinoma metastatic to intra-abdominal lymph node    Transaminitis    Alkaline phosphatase elevation    Anemia in neoplastic disease    Lower extremity edema  -     furosemide (LASIX) 20 MG tablet; Take 0.5 tablets (10 mg total) by mouth once daily.  Dispense: 7 tablet; Refill: 0  -     US Lower Extremity Veins Bilateral; Future; Expected date: 09/29/2020       1. 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 Transaminitis    3. Alkaline phosphatase elevation    4. Anemia in neoplastic disease    5. Lower extremity edema          Plan:     # pancreatic carcinoma:  On palliative single agent gemcitabine.  Bilateral lower extremity edema progressive concerning for side effect of chemotherapy verses thrombosis, see below.  Concerning alkaline phosphatase elevation, relatively stable transaminitis.  Labs also notable for left shifted and mild increase from baseline WBC.  Will hold treatment today and evaluate for possible thrombosis and re-evaluate on Friday for possible continue treatment.  I did discuss importance of palliative care follow-up and will look into scheduling this for her especially given concern for tolerance of therapy and poor functional status.    # bilateral lower extremity edema:  Possible gemcitabine toxicity, third spacing from hypoalbumenamia (slightly worseden on labs) versus thrombosis (left slightly worse edema than right).  Bilateral Doppler ultrasound today to rule out thrombosis specially in setting of hypercoagulable pancreatic malignancy.  If negative for thrombosis I did recommend very small dose Lasix 10 mg daily with close monitoring blood pressure at home and holding if low bp  or symptoms of this. Will have close recheck with labs on Friday.     # anemia:  Mild decline, continue to closely monitor.      Follow-Up:   Patient Instructions   Holding chemo  B/l doppler US today  If no thrombosis trial lasix 10mg daily with close bp monitoring at home  RV on Friday with repeat labs, may consider treatment  Palliative care consult

## 2020-09-29 ENCOUNTER — HOSPITAL ENCOUNTER (OUTPATIENT)
Dept: RADIOLOGY | Facility: HOSPITAL | Age: 75
Discharge: HOME | End: 2020-09-29
Attending: INTERNAL MEDICINE
Payer: MEDICARE

## 2020-09-29 ENCOUNTER — OFFICE VISIT (OUTPATIENT)
Dept: HEMATOLOGY/ONCOLOGY | Facility: CLINIC | Age: 75
End: 2020-09-29
Payer: MEDICARE

## 2020-09-29 VITALS
HEART RATE: 78 BPM | HEIGHT: 61 IN | BODY MASS INDEX: 16.32 KG/M2 | TEMPERATURE: 98 F | SYSTOLIC BLOOD PRESSURE: 119 MMHG | OXYGEN SATURATION: 100 % | WEIGHT: 86.44 LBS | DIASTOLIC BLOOD PRESSURE: 74 MMHG

## 2020-09-29 DIAGNOSIS — R60.0 LOWER EXTREMITY EDEMA: ICD-10-CM

## 2020-09-29 DIAGNOSIS — C77.2 PANCREATIC CARCINOMA METASTATIC TO INTRA-ABDOMINAL LYMPH NODE: Primary | ICD-10-CM

## 2020-09-29 DIAGNOSIS — E88.09 HYPOALBUMINEMIA: ICD-10-CM

## 2020-09-29 DIAGNOSIS — D63.0 ANEMIA IN NEOPLASTIC DISEASE: ICD-10-CM

## 2020-09-29 DIAGNOSIS — R74.8 ALKALINE PHOSPHATASE ELEVATION: ICD-10-CM

## 2020-09-29 DIAGNOSIS — C25.9 PANCREATIC CARCINOMA METASTATIC TO INTRA-ABDOMINAL LYMPH NODE: Primary | ICD-10-CM

## 2020-09-29 DIAGNOSIS — R74.01 TRANSAMINITIS: ICD-10-CM

## 2020-09-29 PROCEDURE — 93970 US LOWER EXTREMITY VEINS BILATERAL: ICD-10-PCS | Mod: 26,,, | Performed by: RADIOLOGY

## 2020-09-29 PROCEDURE — 99215 PR OFFICE/OUTPT VISIT, EST, LEVL V, 40-54 MIN: ICD-10-PCS | Mod: S$PBB,,, | Performed by: INTERNAL MEDICINE

## 2020-09-29 PROCEDURE — 93970 EXTREMITY STUDY: CPT | Mod: TC

## 2020-09-29 PROCEDURE — 93970 EXTREMITY STUDY: CPT | Mod: 26,,, | Performed by: RADIOLOGY

## 2020-09-29 PROCEDURE — 99999 PR PBB SHADOW E&M-EST. PATIENT-LVL IV: CPT | Mod: PBBFAC,,, | Performed by: INTERNAL MEDICINE

## 2020-09-29 PROCEDURE — 99215 OFFICE O/P EST HI 40 MIN: CPT | Mod: S$PBB,,, | Performed by: INTERNAL MEDICINE

## 2020-09-29 PROCEDURE — 99214 OFFICE O/P EST MOD 30 MIN: CPT | Mod: PBBFAC,25 | Performed by: INTERNAL MEDICINE

## 2020-09-29 PROCEDURE — 99999 PR PBB SHADOW E&M-EST. PATIENT-LVL IV: ICD-10-PCS | Mod: PBBFAC,,, | Performed by: INTERNAL MEDICINE

## 2020-09-29 RX ORDER — FUROSEMIDE 20 MG/1
10 TABLET ORAL DAILY
Qty: 7 TABLET | Refills: 0 | Status: SHIPPED | OUTPATIENT
Start: 2020-09-29 | End: 2020-10-02

## 2020-09-29 NOTE — PATIENT INSTRUCTIONS
Holding chemo  B/l doppler US today  If no thrombosis trial lasix 10mg daily with close bp monitoring at home  RV on Friday with repeat labs, may consider treatment  Palliative care consult

## 2020-10-02 ENCOUNTER — OFFICE VISIT (OUTPATIENT)
Dept: HEMATOLOGY/ONCOLOGY | Facility: CLINIC | Age: 75
End: 2020-10-02
Payer: MEDICARE

## 2020-10-02 ENCOUNTER — DOCUMENTATION ONLY (OUTPATIENT)
Dept: HEMATOLOGY/ONCOLOGY | Facility: CLINIC | Age: 75
End: 2020-10-02

## 2020-10-02 ENCOUNTER — INFUSION (OUTPATIENT)
Dept: INFUSION THERAPY | Facility: HOSPITAL | Age: 75
End: 2020-10-02
Attending: INTERNAL MEDICINE
Payer: MEDICARE

## 2020-10-02 VITALS
TEMPERATURE: 98 F | BODY MASS INDEX: 16.32 KG/M2 | OXYGEN SATURATION: 100 % | SYSTOLIC BLOOD PRESSURE: 118 MMHG | HEIGHT: 61 IN | DIASTOLIC BLOOD PRESSURE: 71 MMHG | RESPIRATION RATE: 16 BRPM | HEART RATE: 64 BPM | WEIGHT: 86.44 LBS

## 2020-10-02 VITALS
HEART RATE: 59 BPM | SYSTOLIC BLOOD PRESSURE: 101 MMHG | WEIGHT: 86.44 LBS | DIASTOLIC BLOOD PRESSURE: 59 MMHG | TEMPERATURE: 98 F | OXYGEN SATURATION: 99 % | HEIGHT: 61 IN | BODY MASS INDEX: 16.32 KG/M2

## 2020-10-02 DIAGNOSIS — C25.9 PANCREATIC CARCINOMA METASTATIC TO INTRA-ABDOMINAL LYMPH NODE: ICD-10-CM

## 2020-10-02 DIAGNOSIS — C25.9 PANCREATIC CARCINOMA METASTATIC TO INTRA-ABDOMINAL LYMPH NODE: Primary | ICD-10-CM

## 2020-10-02 DIAGNOSIS — C77.2 PANCREATIC CARCINOMA METASTATIC TO INTRA-ABDOMINAL LYMPH NODE: Primary | ICD-10-CM

## 2020-10-02 DIAGNOSIS — R62.7 FAILURE TO THRIVE IN ADULT: ICD-10-CM

## 2020-10-02 DIAGNOSIS — E86.0 DEHYDRATION: Primary | ICD-10-CM

## 2020-10-02 DIAGNOSIS — R74.01 TRANSAMINITIS: ICD-10-CM

## 2020-10-02 DIAGNOSIS — C77.2 PANCREATIC CARCINOMA METASTATIC TO INTRA-ABDOMINAL LYMPH NODE: ICD-10-CM

## 2020-10-02 DIAGNOSIS — R60.0 LOWER EXTREMITY EDEMA: ICD-10-CM

## 2020-10-02 PROCEDURE — A4216 STERILE WATER/SALINE, 10 ML: HCPCS | Performed by: INTERNAL MEDICINE

## 2020-10-02 PROCEDURE — 99999 PR PBB SHADOW E&M-EST. PATIENT-LVL IV: CPT | Mod: PBBFAC,,, | Performed by: INTERNAL MEDICINE

## 2020-10-02 PROCEDURE — 96361 HYDRATE IV INFUSION ADD-ON: CPT

## 2020-10-02 PROCEDURE — 63600175 PHARM REV CODE 636 W HCPCS: Performed by: INTERNAL MEDICINE

## 2020-10-02 PROCEDURE — 96360 HYDRATION IV INFUSION INIT: CPT

## 2020-10-02 PROCEDURE — 25000003 PHARM REV CODE 250: Performed by: INTERNAL MEDICINE

## 2020-10-02 PROCEDURE — 99215 PR OFFICE/OUTPT VISIT, EST, LEVL V, 40-54 MIN: ICD-10-PCS | Mod: S$PBB,,, | Performed by: INTERNAL MEDICINE

## 2020-10-02 PROCEDURE — 99999 PR PBB SHADOW E&M-EST. PATIENT-LVL IV: ICD-10-PCS | Mod: PBBFAC,,, | Performed by: INTERNAL MEDICINE

## 2020-10-02 PROCEDURE — 99215 OFFICE O/P EST HI 40 MIN: CPT | Mod: S$PBB,,, | Performed by: INTERNAL MEDICINE

## 2020-10-02 PROCEDURE — 96375 TX/PRO/DX INJ NEW DRUG ADDON: CPT

## 2020-10-02 PROCEDURE — 96374 THER/PROPH/DIAG INJ IV PUSH: CPT

## 2020-10-02 PROCEDURE — 99214 OFFICE O/P EST MOD 30 MIN: CPT | Mod: PBBFAC,25 | Performed by: INTERNAL MEDICINE

## 2020-10-02 RX ORDER — HEPARIN 100 UNIT/ML
500 SYRINGE INTRAVENOUS
Status: CANCELLED | OUTPATIENT
Start: 2020-10-02

## 2020-10-02 RX ORDER — ONDANSETRON 2 MG/ML
4 INJECTION INTRAMUSCULAR; INTRAVENOUS ONCE
Status: CANCELLED | OUTPATIENT
Start: 2020-10-02

## 2020-10-02 RX ORDER — SODIUM CHLORIDE 0.9 % (FLUSH) 0.9 %
10 SYRINGE (ML) INJECTION
Status: DISCONTINUED | OUTPATIENT
Start: 2020-10-02 | End: 2020-10-02 | Stop reason: HOSPADM

## 2020-10-02 RX ORDER — SODIUM CHLORIDE 0.9 % (FLUSH) 0.9 %
10 SYRINGE (ML) INJECTION
Status: CANCELLED | OUTPATIENT
Start: 2020-10-02

## 2020-10-02 RX ORDER — HEPARIN 100 UNIT/ML
500 SYRINGE INTRAVENOUS
Status: DISCONTINUED | OUTPATIENT
Start: 2020-10-02 | End: 2020-10-02 | Stop reason: HOSPADM

## 2020-10-02 RX ORDER — ONDANSETRON 2 MG/ML
4 INJECTION INTRAMUSCULAR; INTRAVENOUS ONCE
Status: COMPLETED | OUTPATIENT
Start: 2020-10-02 | End: 2020-10-02

## 2020-10-02 RX ADMIN — ONDANSETRON 4 MG: 2 INJECTION INTRAMUSCULAR; INTRAVENOUS at 11:10

## 2020-10-02 RX ADMIN — SODIUM CHLORIDE 500 ML: 0.9 INJECTION, SOLUTION INTRAVENOUS at 11:10

## 2020-10-02 RX ADMIN — HEPARIN 500 UNITS: 100 SYRINGE at 12:10

## 2020-10-02 RX ADMIN — Medication 10 ML: at 11:10

## 2020-10-02 NOTE — DISCHARGE INSTRUCTIONS
[facility]  [address] [address] [facility]  [address]  [phone] phone     [phone] fax  Hours of Operation: Monday- Friday 8:00am- 5:00pm  After hours phone  [phone]  Hematology / Oncology Physicians on call      Dr. Ermias Londono, NATAILA Tidwell NP Tyesha Taylor, NP    Please call with any concerns regarding your appointment today.FALL PREVENTION   Falls often occur due to slipping, tripping or losing your balance. Here are ways to reduce your risk of falling again.   Was there anything that caused your fall that can be fixed, removed or replaced?   Make your home safe by keeping walkways clear of objects you may trip over.   Use non-slip pads under rugs.   Do not walk in poorly lit areas.   Do not stand on chairs or wobbly ladders.   Use caution when reaching overhead or looking upward. This position can cause a loss of balance.   Be sure your shoes fit properly, have non-slip bottoms and are in good condition.   Be cautious when going up and down stairs, curbs, and when walking on uneven sidewalks.   If your balance is poor, consider using a cane or walker.   If your fall was related to alcohol use, stop or limit alcohol intake.   If your fall was related to use of sleeping medicines, talk to your doctor about this. You may need to reduce your dosage at bedtime if you awaken during the night to go to the bathroom.   To reduce the need for nighttime bathroom trips:   Avoid drinking fluids for several hours before going to bed   Empty your bladder before going to bed   Men can keep a urinal at the bedside   © 1669-0344 [employer] [facility], [address] [place] [address], [place] [address]. All rights reserved. This information is not intended as a substitute for professional medical care. Always follow your healthcare professional's instructions.

## 2020-10-02 NOTE — PROGRESS NOTES
Errol called [facility] to f/u on patient's wheelchair order. Staff reports patient lives outside of their service area. Staff reports they sent referral to Donna Gonzalez. Errol called Donna's office. Staff apologized because they never reached out to patient to schedule delivery. Staff asked would patient be at home by 3pm today. Errol colleague met with patient's daughter and she reports they will be at home by 3pm. Errol informed [facility] Staff. Sw will f/u as needed.

## 2020-10-02 NOTE — PROGRESS NOTES
Subjective:      DATE OF VISIT: 10/2/2020   ?   ?   Patient ID:?Ave Bunch is a 75 y.o. female.?? MR#: [id number]   ?   PRIMARY PROVIDER:  Dr. Onofre  ?   CHIEF COMPLAINT:  Follow-up?????   ?   ONCOLOGIC DIAGNOSIS:  Pancreatic carcinoma  ?   CURRENT TREATMENT:  Gemcitabine monotherapy      HPI    Ms. Mccormack presents with her daughter, still severely fatigued and stable lower extremity edema.  After visit last time she had Doppler ultrasound negative for bilateral lower extremity DVT.  Due to low blood pressure she notes holding Lasix trial.  She notes poor p.o. intake despite Megace use.       Review of Systems    ?   A comprehensive 14-point review of systems was reviewed with patient and was negative other than as specified above.   ?     Objective:      Physical Exam      ?   Vitals:    10/02/20 0916   BP: (!) 101/59   Pulse: (!) 59   Temp: 98 °F (36.7 °C)      ?   ECOG:?3   General appearance:  Generally ill-appearing, frail, soft-spoken  Head, eyes, ears, nose, and throat: Oropharynx clear with moist mucous membranes.   Cardiovascular: Regular rate and rhythm, S1, S2, no audible murmurs.   Respiratory: Lungs clear to auscultation bilaterally.   Abdomen: nontender, nondistended.   Extremities: Warm, with 2+ pitting edema throughout shins bilaterally symmetric, stable, nontender  Neurologic: Alert and oriented.  Slow, unstable gait.  Skin: No rashes, ecchymoses or petechial lesion, vitiligo.   Psychiatric: normal mood and affect, conversant and appropriate    ?   Laboratory:  ?   Lab Visit on 10/02/2020   Component Date Value Ref Range Status    WBC 10/02/2020 8.80  3.90 - 12.70 K/uL Final    RBC 10/02/2020 2.88* 4.00 - 5.40 M/uL Final    Hemoglobin 10/02/2020 8.7* 12.0 - 16.0 g/dL Final    Hematocrit 10/02/2020 25.9* 37.0 - 48.5 % Final    Mean Corpuscular Volume 10/02/2020 90  82 - 98 fL Final    Mean Corpuscular Hemoglobin 10/02/2020 30.2  27.0 - 31.0 pg Final    Mean Corpuscular  Hemoglobin Conc 10/02/2020 33.6  32.0 - 36.0 g/dL Final    RDW 10/02/2020 21.8* 11.5 - 14.5 % Final    Platelets 10/02/2020 213  150 - 350 K/uL Final    MPV 10/02/2020 10.2  9.2 - 12.9 fL Final    Immature Granulocytes 10/02/2020 0.3  0.0 - 0.5 % Final    Gran # (ANC) 10/02/2020 6.5  1.8 - 7.7 K/uL Final    Immature Grans (Abs) 10/02/2020 0.03  0.00 - 0.04 K/uL Final    Lymph # 10/02/2020 1.8  1.0 - 4.8 K/uL Final    Mono # 10/02/2020 0.5  0.3 - 1.0 K/uL Final    Eos # 10/02/2020 0.0  0.0 - 0.5 K/uL Final    Baso # 10/02/2020 0.02  0.00 - 0.20 K/uL Final    nRBC 10/02/2020 0  0 /100 WBC Final    Gran% 10/02/2020 73.9* 38.0 - 73.0 % Final    Lymph% 10/02/2020 20.6  18.0 - 48.0 % Final    Mono% 10/02/2020 5.2  4.0 - 15.0 % Final    Eosinophil% 10/02/2020 0.1  0.0 - 8.0 % Final    Basophil% 10/02/2020 0.2  0.0 - 1.9 % Final    Differential Method 10/02/2020 Automated   Final    Sodium 10/02/2020 137  136 - 145 mmol/L Final    Potassium 10/02/2020 3.8  3.5 - 5.1 mmol/L Final    Chloride 10/02/2020 106  95 - 110 mmol/L Final    CO2 10/02/2020 20* 23 - 29 mmol/L Final    Glucose 10/02/2020 132* 70 - 110 mg/dL Final    BUN, Bld 10/02/2020 11  8 - 23 mg/dL Final    Creatinine 10/02/2020 0.6  0.5 - 1.4 mg/dL Final    Calcium 10/02/2020 7.6* 8.7 - 10.5 mg/dL Final    Total Protein 10/02/2020 5.4* 6.0 - 8.4 g/dL Final    Albumin 10/02/2020 2.0* 3.5 - 5.2 g/dL Final    Total Bilirubin 10/02/2020 0.7  0.1 - 1.0 mg/dL Final    Alkaline Phosphatase 10/02/2020 247* 55 - 135 U/L Final    AST 10/02/2020 52* 10 - 40 U/L Final    ALT 10/02/2020 43  10 - 44 U/L Final    Anion Gap 10/02/2020 11  8 - 16 mmol/L Final    eGFR if African American 10/02/2020 >60  >60 mL/min/1.73 m^2 Final    eGFR if non African American 10/02/2020 >60  >60 mL/min/1.73 m^2 Final      ?   ?   Assessment/Plan:   Pancreatic carcinoma metastatic to intra-abdominal lymph node    Failure to thrive in adult    Transaminitis    Lower  extremity edema       1. 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 Failure to thrive in adult    3. Transaminitis    4. Lower extremity edema          Plan:     # pancreatic carcinoma:  On palliative single agent gemcitabine.  She has exceptionally poor functional status and expressed my concern for tolerance of therapy at this point.  ECOG 3.  She is most bothered by lower extremity edema, see below.  They have not yet met with palliative care and discuss this is very important to further discuss goals of care.  Would like her to follow-up with palliative care, nutrition and her primary oncologist next week.     Will be holding chemotherapy today due to poor functional status and give supportive 500 cc IV fluid slowly.    # bilateral lower extremity edema:  Suspect largely due to hypoalbuminemia, possible contribution of gemcitabine as she notes some worsening during this time however albumin and nutritional status has worsened as well.  Doppler ultrasound negative for thrombosis.  They understand recommendation to not use any diuretic since this will only further intravascularly deplete and cause lower blood pressure.  Recommended nutrition consultation and palliative care.    # transaminitis:  Relatively stable, mild decline from earlier this week, continue monitor.    # anemia:  Likely anemia of chronic illness, no evidence of bleeding, continue to closely monitor.      Follow-Up:   Patient Instructions   No chemo today  500cc ivf and zofran in tx area today  Please arrange palliative care, Dr. Onofre follow-up and nutrition consultation asap

## 2020-10-02 NOTE — PATIENT INSTRUCTIONS
No chemo today  500cc ivf and zofran in tx area today  Please arrange palliative care, Dr. Onofre follow-up and nutrition consultation asap

## 2020-10-06 NOTE — ASSESSMENT & PLAN NOTE
Metastatic pancreatic adenocarcinoma, on monotherapy with gemcitabine.  Recent PET-CT scan showed evidence of disease progression at the head of pancreas with intra-abdominal adenopathies.  Patient is currently ECOG 3, recommend hospice at this time.  Information regarding this were provided to patient and daughter.  They know to contact us if needed with any questions or concern.

## 2020-10-07 ENCOUNTER — OFFICE VISIT (OUTPATIENT)
Dept: HEMATOLOGY/ONCOLOGY | Facility: CLINIC | Age: 75
End: 2020-10-07
Payer: MEDICARE

## 2020-10-07 VITALS
HEIGHT: 61 IN | RESPIRATION RATE: 16 BRPM | HEART RATE: 71 BPM | DIASTOLIC BLOOD PRESSURE: 66 MMHG | BODY MASS INDEX: 16.27 KG/M2 | TEMPERATURE: 98 F | OXYGEN SATURATION: 99 % | SYSTOLIC BLOOD PRESSURE: 104 MMHG | WEIGHT: 86.19 LBS

## 2020-10-07 DIAGNOSIS — C25.9 PANCREATIC CARCINOMA METASTATIC TO INTRA-ABDOMINAL LYMPH NODE: ICD-10-CM

## 2020-10-07 DIAGNOSIS — C77.2 PANCREATIC CARCINOMA METASTATIC TO INTRA-ABDOMINAL LYMPH NODE: ICD-10-CM

## 2020-10-07 PROCEDURE — 99999 PR PBB SHADOW E&M-EST. PATIENT-LVL III: CPT | Mod: PBBFAC,,, | Performed by: INTERNAL MEDICINE

## 2020-10-07 PROCEDURE — 99213 OFFICE O/P EST LOW 20 MIN: CPT | Mod: PBBFAC | Performed by: INTERNAL MEDICINE

## 2020-10-07 PROCEDURE — 99215 PR OFFICE/OUTPT VISIT, EST, LEVL V, 40-54 MIN: ICD-10-PCS | Mod: S$PBB,,, | Performed by: INTERNAL MEDICINE

## 2020-10-07 PROCEDURE — 99999 PR PBB SHADOW E&M-EST. PATIENT-LVL III: ICD-10-PCS | Mod: PBBFAC,,, | Performed by: INTERNAL MEDICINE

## 2020-10-07 PROCEDURE — 99215 OFFICE O/P EST HI 40 MIN: CPT | Mod: S$PBB,,, | Performed by: INTERNAL MEDICINE

## 2020-10-07 NOTE — PROGRESS NOTES
Subjective:       Patient ID: Ave Bunch is a 75 y.o. female.    Chief Complaint: No primary diagnosis found.  HPI: We have an opportunity to see Ms. Ave Bunch in Hematology Oncology clinic at [facility] on 10/07/2020.  Ms. Ave Bunch is a 75 y.o. female with metastatic pancreatic cancer, currently on gemcitabine.    She has missed a couple treatments due to debility, low functional status and dehydration.    Today she complains of abdominal pain and distension, nausea vomiting, back pain and decreased appetite despite being on Marinol.  Denies fever, chills, diarrhea or dysuria.  Denies abnormal bleeding including hemoptysis or hematuria.      Oncology History   Pancreatic carcinoma metastatic to intra-abdominal lymph node   8/3/2020 Initial Diagnosis    Pancreatic carcinoma metastatic to intra-abdominal lymph node     8/18/2020 -  Chemotherapy    Treatment Summary   Plan Name: OP PANC GEMCITABINE QW  Treatment Goal: Palliative  Status: Active  Start Date: 8/18/2020  End Date: 2/10/2021 (Planned)  Provider: Justin Onofre MD  Chemotherapy: gemcitabine 1,340 mg in sodium chloride 0.9% 285.3 mL chemo infusion, 1,000 mg/m2 = 1,340 mg, Intravenous, Clinic/HOD 1 time, 1 of 6 cycles  Dose modification: 800 mg/m2 (80 % of original dose 1,000 mg/m2, Cycle 1, Reason: Dose Not Tolerated)  Administration: 1,340 mg (8/18/2020), 1,340 mg (8/25/2020), 1,070 mg (9/15/2020)       No past medical history on file.  No family history on file.  Social History     Socioeconomic History    Marital status: Single     Spouse name: Not on file    Number of children: Not on file    Years of education: Not on file    Highest education level: Not on file   Occupational History    Not on file   Social Needs    Financial resource strain: Not on file    Food insecurity     Worry: Not on file     Inability: Not on file    Transportation needs     Medical: Not on file     Non-medical: Not  on file   Tobacco Use    Smoking status: Never Smoker   Substance and Sexual Activity    Alcohol use: Never     Frequency: Never    Drug use: Never    Sexual activity: Not Currently   Lifestyle    Physical activity     Days per week: Not on file     Minutes per session: Not on file    Stress: Not on file   Relationships    Social connections     Talks on phone: Not on file     Gets together: Not on file     Attends Shinto service: Not on file     Active member of club or organization: Not on file     Attends meetings of clubs or organizations: Not on file     Relationship status: Not on file   Other Topics Concern    Not on file   Social History Narrative    Not on file     No past surgical history on file.  Current Outpatient Medications   Medication Sig Dispense Refill    amLODIPine (NORVASC) 5 MG tablet Take 5 mg by mouth 2 (two) times a day.      azithromycin (Z-JUICE) 250 MG tablet Take 1 tablet (250 mg total) by mouth once daily. 30 tablet 2    dronabinoL (MARINOL) 2.5 MG capsule Take 1 capsule (2.5 mg total) by mouth 2 (two) times daily before meals. 60 capsule 1    glipiZIDE (GLUCOTROL) 10 MG TR24 Take 10 mg by mouth 2 (two) times a day.      HYDROcodone-acetaminophen (NORCO)  mg per tablet Take 1 tablet by mouth every 8 (eight) hours as needed for Pain. 60 tablet 0    levothyroxine (SYNTHROID) 150 MCG tablet Take 150 mcg by mouth once daily.      levothyroxine 150 mcg Cap Take 150 mcg by mouth once daily.      lipase-protease-amylase 6,000-19,000-30,000 units (CREON) 6,000-19,000 -30,000 unit capsule Take 1 capsule by mouth 3 (three) times daily with meals. 90 capsule 11    megestroL (MEGACE) 400 mg/10 mL (40 mg/mL) Susp Take 20 mg by mouth once daily.      ondansetron (ZOFRAN) 8 MG tablet Take 8 mg by mouth every 8 (eight) hours as needed.      ondansetron (ZOFRAN-ODT) 8 MG TbDL Take 1 tablet (8 mg total) by mouth every 12 (twelve) hours as needed. 30 tablet 1    potassium  chloride SA (K-DUR,KLOR-CON) 20 MEQ tablet Take 1 tablet (20 mEq total) by mouth 2 (two) times daily. 30 tablet 1    promethazine (PHENERGAN) 25 MG tablet Take 25 mg by mouth every 6 (six) hours as needed.      simvastatin (ZOCOR) 20 MG tablet Take 20 mg by mouth every evening.       No current facility-administered medications for this visit.        Labs:  Lab Results   Component Value Date    WBC 8.80 10/02/2020    HGB 8.7 (L) 10/02/2020    HCT 25.9 (L) 10/02/2020    MCV 90 10/02/2020     10/02/2020     BMP  Lab Results   Component Value Date     10/02/2020    K 3.8 10/02/2020     10/02/2020    CO2 20 (L) 10/02/2020    BUN 11 10/02/2020    CREATININE 0.6 10/02/2020    CALCIUM 7.6 (L) 10/02/2020    ANIONGAP 11 10/02/2020    ESTGFRAFRICA >60 10/02/2020    EGFRNONAA >60 10/02/2020     Lab Results   Component Value Date    ALT 43 10/02/2020    AST 52 (H) 10/02/2020    ALKPHOS 247 (H) 10/02/2020    BILITOT 0.7 10/02/2020       No results found for: IRON, TIBC, FERRITIN, SATURATEDIRO  No results found for: QXAVPAJZ90  No results found for: FOLATE  No results found for: TSH    I have reviewed the radiology reports and examined the scan/xray images.    Review of Systems   Constitutional: Negative.    HENT: Negative.    Eyes: Negative.    Respiratory: Negative.    Cardiovascular: Negative.    Gastrointestinal: Negative.    Endocrine: Negative.    Genitourinary: Negative.    Musculoskeletal: Negative.    Skin: Negative.    Allergic/Immunologic: Negative.    Neurological: Negative.    Hematological: Negative.    Psychiatric/Behavioral: Negative.      ECOG SCORE              Objective:     Vitals:    10/07/20 1415   BP: 104/66   Pulse: 71   Resp: 16   Temp: 97.9 °F (36.6 °C)   Body mass index is 16.29 kg/m².  Physical Exam  Vitals signs and nursing note reviewed.   Constitutional:       Appearance: She is well-developed. She is cachectic. She is ill-appearing.   HENT:      Head: Normocephalic and  atraumatic.   Eyes:      Conjunctiva/sclera: Conjunctivae normal.   Neck:      Musculoskeletal: Normal range of motion and neck supple.   Cardiovascular:      Rate and Rhythm: Normal rate and regular rhythm.   Pulmonary:      Effort: Pulmonary effort is normal.      Breath sounds: Normal breath sounds.   Abdominal:      General: Bowel sounds are normal.      Palpations: Abdomen is soft.   Musculoskeletal: Normal range of motion.   Skin:     General: Skin is warm and dry.   Neurological:      Mental Status: She is alert and oriented to person, place, and time.   Psychiatric:         Behavior: Behavior normal.         Thought Content: Thought content normal.         Judgment: Judgment normal.       PET CT Impression:     1. Findings consistent with the given history of pancreatic adenocarcinoma with a large FDG avid mass noted in the region of the pancreatic head.  Small subcentimeter focal area of uptake noted within the right hepatic lobe suspicious for metastatic disease.  Somewhat more diffuse uptake noted within the karen hepatis/gallbladder fossa which may be related to postsurgical change.  Tumor involvement would be difficult to exclude.  2. Bilateral adrenal gland uptake possibly related to a paraneoplastic syndrome or possibly adrenal hyperplasia as there is no discrete adrenal gland nodule to account for the uptake within either adrenal gland.    Assessment:      1. Pancreatic carcinoma metastatic to intra-abdominal lymph node           Plan:     Pancreatic carcinoma metastatic to intra-abdominal lymph node  Metastatic pancreatic adenocarcinoma, on monotherapy with gemcitabine.  Recent PET-CT scan showed evidence of disease progression at the head of pancreas with intra-abdominal adenopathies.  Patient is currently ECOG 3, recommend hospice at this time.  Information regarding this were provided to patient and daughter.  They know to contact us if needed with any questions or concern.    Pancreatic  carcinoma metastatic to intra-abdominal lymph node  -     CBC auto differential; Future; Expected date: 10/07/2020  -     Comprehensive Metabolic Panel; Future; Expected date: 10/07/2020  -     CA19.9; Future; Expected date: 10/07/2020      Justin Onofre MD

## 2020-10-09 ENCOUNTER — PATIENT MESSAGE (OUTPATIENT)
Dept: PALLIATIVE MEDICINE | Facility: CLINIC | Age: 75
End: 2020-10-09

## 2020-10-09 ENCOUNTER — TELEPHONE (OUTPATIENT)
Dept: PALLIATIVE MEDICINE | Facility: CLINIC | Age: 75
End: 2020-10-09

## 2020-10-09 NOTE — TELEPHONE ENCOUNTER
Palliative Care:    Patient missed virtual consult appointment this morning.  Called to follow-up, no answer, left message on voicemail with my direct line to return call.    Mckayla Smith RN

## 2020-10-12 ENCOUNTER — PATIENT MESSAGE (OUTPATIENT)
Dept: HEMATOLOGY/ONCOLOGY | Facility: CLINIC | Age: 75
End: 2020-10-12

## 2020-10-13 ENCOUNTER — TELEPHONE (OUTPATIENT)
Dept: HEMATOLOGY/ONCOLOGY | Facility: CLINIC | Age: 75
End: 2020-10-13

## 2020-10-13 NOTE — TELEPHONE ENCOUNTER
Nurse spoke with patients daughter. She states driving to [place] for appts has been too much on them so they are looking for treatment at their local hospital. Nurse informed daughter if something changes don't hesitate to reach out to us. Verbalized understanding.

## 2020-10-27 ENCOUNTER — TELEPHONE (OUTPATIENT)
Dept: HEMATOLOGY/ONCOLOGY | Facility: CLINIC | Age: 75
End: 2020-10-27

## 2020-10-27 NOTE — TELEPHONE ENCOUNTER
Patient was referred to social work due to distress score of 5. Sw called patient's daughter on today to address score. Pt's daughter reports she is attempting to obtain oncology care from [facility]. Marichuy reports the drive was too long. Sw asked did she receive patient's wheelchair that was ordered. She reports she did. Sw encouraged Marichuy to call if she have any further needs until new care is obtained. Sw will f/u as needed.

## 2020-11-03 ENCOUNTER — PATIENT MESSAGE (OUTPATIENT)
Dept: HEMATOLOGY/ONCOLOGY | Facility: CLINIC | Age: 75
End: 2020-11-03

## 2020-11-10 ENCOUNTER — HISTORICAL (OUTPATIENT)
Dept: WOUND CARE | Facility: HOSPITAL | Age: 75
End: 2020-11-10

## 2020-11-20 ENCOUNTER — PATIENT MESSAGE (OUTPATIENT)
Dept: HEMATOLOGY/ONCOLOGY | Facility: CLINIC | Age: 75
End: 2020-11-20

## 2020-12-10 ENCOUNTER — PATIENT MESSAGE (OUTPATIENT)
Dept: HEMATOLOGY/ONCOLOGY | Facility: CLINIC | Age: 75
End: 2020-12-10

## 2021-01-19 ENCOUNTER — PATIENT MESSAGE (OUTPATIENT)
Dept: HEMATOLOGY/ONCOLOGY | Facility: CLINIC | Age: 76
End: 2021-01-19

## 2022-05-03 NOTE — HISTORICAL [facility] CERNER
This is a historical note converted from Lisa. Formatting and pictures may have been removed.  Please reference Lisa for original formatting and attached multimedia. Chief Complaint  R hip wound approximately 1 1/2 mths  History of Present Illness  74y/o with a hx of pancreatic cancer (HEAD). The patient have an exploratory laparatomy 7/15/2020 with cholecystectomy and palliative billiary bypass,choledochojejunostomy and palliative gastric bypass,loop gastrojejunostomy and wedge resection of some lesions in the liver, confirmed metastatic carcinoma. Billiary stent?was removed at the same time.? Pt is c/o diarrhea for some days now. Her ulcer in the hip is more open now. [facility] to go to [facility] for a [facility] opinion for her cancer. Plans is to continue collagen QOD, adaptic and tape.PMH DM,HTN,Hypothyroidism,Malignat cachexia, malnutrtion 2nd to the cancer, vitiligo.  Review of Systems  see nurse notes  Physical Exam  Vitals & Measurements  T:?36.7? ?C (Oral)? HR:?61(Peripheral)? RR:?18? BP:?132/69? SpO2:?98%?  HT:?155?cm? WT:?49.4?kg?  see nurse notes  Assessment/Plan  1.?Diabetes mellitus type 2, controlled, without complications?E11.9  2.?Decubitus ulcer of right hip, unstageable?L89.210  3.?Eschar of wound bed?T14.8XXA  4.?Malignant neoplasm of head of pancreas?C25.0  5.?Pressure ulcer of right buttock, stage 2?L89.312  6.?Primary pancreatic cancer?C25.9  ?Incision/Wounds  ?1. Hip Right Pressure ulcer?- last charted: 08/13/2020 13:40  ?? ??Assessment Done By?- Wound Care Team  ?? ??Pressure Point?- Bony prominence  ?? ??Dressing Type?- Foam  ?? ??Dressing Assessment?- Drainage present, Intact  ?? ??Dressing Activity?- Changed  ?? ??Cleansing?- Cleaned with normal saline  ?? ??Length?- 1 cm  ?? ??Width?- 2.8 cm  ?? ??Depth?- 0.1 cm  ?? ??Wound Bed Tissue Type?- Granulating, Hypergranular  ?? ??Percent Granulated?- 100 %  ?? ??Pressure Ulcer Stage?- III  ?? ??Exudate Amount?- Moderate  ?? ??Exudate Type?- Serous  ??  ??Exudate Odor?- None  ?? ??Edge?- Well defined  ?? ??Surrounding Tissue Color?- Erythema  ?? ??Surrounding Tissue Condition?- Intact  ?? ??Status?- Deteriorating  ?  ?2. Buttock Right Pressure ulcer?- last charted: 08/13/2020 13:40  ?? ??Assessment Done By?- Wound Care Team  ?? ??Dressing Type?- None  ?? ??Cleansing?- Cleaned with normal saline  ?? ??Wound Bed Tissue Type?- Epithelialized, Erythema  ?? ??Exudate Amount?- None  ?? ??Exudate Odor?- None  ?? ??Edge?- Poorly defined  ?? ??Surrounding Tissue Color?- Erythema  ?? ??Surrounding Tissue Condition?- Intact  ?? ??Status?- Healed  ?  ?3. Abdomen Transverse Surgical incision?- last charted: 08/13/2020 13:40  ?? ??Assessment Done By?- Wound Care Team  ?? ??Dressing Type?- None  ?? ??Exudate Amount?- None  ?? ??Edge?- Approximated  ?? ??Surrounding Tissue Color?- Normal  ?? ??Surrounding Tissue Condition?- Intact  ?? ??Status?- Healed  ?Pt examined and notes and records were review. Please see my note in HP. Plans is to continue collagen QOD, adaptic,tape. [facility] is going to [facility] for [facility] opinion of her cancer. RTC in 1 week. Continue HH. Pt oncologist is Dr Tanner, last appt 7/28 and he dont think he will proceed with chemotherapy in this patient , she is to frail, if she improve single agent therapy and if she declines clinically then hospice and supportive care woulb be the next step.  Orders:  Wound Care Outpatient, *Est. 08/27/20 3:00:00 CDT, *Est. Stop date 08/27/20 3:00:00 CDT, [facility], Return to Clinic 2 weeks  Wound Care Outpatient, *Est. 08/20/20 3:00:00 CDT, *Est. Stop date 08/20/20 3:00:00 CDT, [facility], FU with Physician in 1 week  Wound Care Team Treatment, 08/13/20 13:49:00 CDT, Stop date 08/13/20 13:49:00 CDT, continue collagen QOD, adaptic,tape. RTC in 1 week.  Referrals  Discharge Follow Up, Specialty: General Surgery, Reason: follow up bucky, Refer To: Olinda JEFFERSON, Shiva WING, [facility]. Medical Specialist: Marla Garza, [phone] [id number] Start: 07/18/20 14:14:00 CDT  Clinic Follow up, *Est. 08/20/20 9:40:00 CDT, [facility] team, Order for future visit, Malignant neoplasm of head of pancreas  Decubitus ulcer of right hip, unstageable  Malignant cachexia  Liver masses, [facility]  Infusion Visit, 06/11/20 12:00:00 CDT, Order for future visit  Lab Draw, 06/04/20 10:30:00 CDT  Lab Draw, *Est. 08/20/20 9:15:00 CDT, Order for future visit, Malignant neoplasm of head of pancreas, [facility]  Lab Draw, 06/10/20 15:30:00 CDT  Lab Draw, 06/04/20 8:00:00 CDT, Day 8, Pancreatic cancer  Agranulocytosis secondary to cancer chemotherapy, [facility]  Nutrition Request (in clinic), 06/04/20 10:45:00 CDT, [facility]  Referral to Gastroenterology, Elevated LFTs and bilirubin, Referred to Provider Dr. Youssef, Patient known to him., Malignant neoplasm of head of pancreas  Decubitus ulcer of right hip, unstageable  Agranulocytosis secondary to cancer chemotherapy  Anemia  Treatment/Doctor Visit, 06/04/20 11:00:00 CDT  Treatment/Doctor Visit, 07/28/20 10:30:00 CDT, Malignant neoplasm of head of pancreas  Decubitus ulcer of right hip, unstageable, [facility]  Treatment/Doctor Visit, 05/21/20 14:30:00 CDT, Pancreatic cancer  Agranulocytosis secondary to cancer chemotherapy, [facility]   Problem List/Past Medical History  Ongoing  Decubitus ulcer of right hip, unstageable  Diabetes mellitus  Diabetes mellitus type 2, controlled, without complications  Eschar of wound bed  High blood pressure  Hypercholesterolemia  Hypothyroidism(  Confirmed  )  Liver masses  Malignant cachexia  Malignant neoplasm of head of pancreas  Pressure ulcer of hip  Vitiligo  Historical  No qualifying data  Procedure/Surgical History  Bypass Common Bile Duct to Small Intestine, Open Approach (07/13/2020)  Bypass Stomach to Jejunum, Open Approach (07/13/2020)  Excision of Liver, Open Approach, Diagnostic  (07/13/2020)  Excision of Right Lobe Liver, Open Approach, Diagnostic (07/13/2020)  Resection of Gallbladder, Open Approach (07/13/2020)  Whipple Procedure (.) (07/13/2020)  Dilation of Common Bile Duct with Intraluminal Device, Via Natural or Artificial Opening Endoscopic (04/15/2020)  Endoscopic Retrograde Cholangiopancreatography (04/15/2020)  Extirpation of Matter from Common Bile Duct, Via Natural or Artificial Opening Endoscopic (04/15/2020)  Pancreatic Stent Placement (04/15/2020)  Catheter Insertion Mediport (.) (03/12/2020)  Fluoroscopy of Superior Vena Cava using Low Osmolar Contrast, Guidance (03/12/2020)  Insertion of Infusion Device into Superior Vena Cava, Percutaneous Approach (03/12/2020)  Insertion of Totally Implantable Vascular Access Device into Chest Subcutaneous Tissue and Fascia, Open Approach (03/12/2020)  Insertion of tunneled centrally inserted central venous access device, with subcutaneous port; age 5 years or older (03/12/2020)  Biliary Stent (Upper) (02/20/2020)  Dilation of Common Bile Duct with Intraluminal Device, Via Natural or Artificial Opening Endoscopic (02/20/2020)  EGD w/ Fine Needle Biopsy / Aspiration (Upper) (02/20/2020)  Endoscopic retrograde cholangiopancreatography (ERCP); with placement of endoscopic stent into biliary or pancreatic duct, including pre- and post-dilation and guide wire passage, when performed, including sphincterotomy, when performed, each stent (02/20/2020)  Endoscopic Retrograde Cholangiopancreatography (Upper) (02/20/2020)  Endoscopic Ultrasonography (Upper) (02/20/2020)  Biopsy Gastrointestinal (02/13/2020)  Endoscopic Retrograde Cholangiopancreatography (02/13/2020)  Esophagogastroduodenoscopy (02/13/2020)  Esophagogastroduodenoscopy, flexible, transoral; with biopsy, single or multiple (02/13/2020)  Excision of Duodenum, Via Natural or Artificial Opening Endoscopic, Diagnostic (02/13/2020)  bladder sx (2015)  Thyroidectomy (1970)  left arm surgery  to remove tumor   Medications  acetaminophen-hydrocodone 325 mg-10 mg oral tablet, Oral, q4-6hr  amlodipine 5 mg oral tablet, 5 mg= 1 tab(s), Oral, BID  amlodipine 5 mg oral tablet, 5 mg= 1 tab(s), Oral, BID  Creon 6000 units oral delayed release capsule, 1 cap(s), Oral, QID  glipiZIDE 10 mg oral tablet, extended release (XL tab), 10 mg= 1 tab(s), Oral, BID  levothyroxine 150 mcg (0.15 mg) oral capsule, 150 mcg= 1 cap(s), Oral, Daily  levothyroxine 150 mcg (0.15 mg) oral tablet, 150 mcg= 1 tab(s), Oral, Daily  megestrol 40 mg/mL oral suspension, 800 mg= 20 mL, Oral, Daily, 6 refills  promethazine 25 mg oral tablet, 25 mg= 1 tab(s), Oral, q6hr  Zofran 8 mg oral tablet, 8 mg= 1 tab(s), Oral, q8hr, PRN  Allergies  No Known Allergies  No Known Medication Allergies  Social History  Abuse/Neglect  No, 08/11/2020  Alcohol  Never, 03/05/2020  Employment/School  Unemployed, 03/05/2020  Exercise  Nutrition/Health  Regular, 03/05/2020  Spiritual/Cultural  Baptism, 07/06/2020  Substance Use  Never, 03/05/2020  Tobacco  Never (less than 100 in lifetime), N/A, 08/11/2020  Family History  Diabetes mellitus type 2: Mother.  Hypertension.: Mother.  Malignant neoplasm of cervix: Mother.  Health Maintenance  Health Maintenance  ???Pending?(in the next year)  ??? ??OverDue  ??? ? ? ?Diabetes Maintenance-Microalbumin due??and every?  ??? ? ? ?Alcohol Misuse Screening due??01/02/20??and every 1??year(s)  ??? ? ? ?Cognitive Screening due??01/02/20??and every 1??year(s)  ??? ? ? ?Diabetes Maintenance-HgbA1c due??06/20/20??and every 1??year(s)  ??? ??Due?  ??? ? ? ?Bone Density Screening due??08/13/20??Variable frequency  ??? ? ? ?Breast Cancer Screening due??08/13/20??and every?  ??? ? ? ?Colorectal Screening due??08/13/20??and every?  ??? ? ? ?Diabetes Maintenance-Fasting Lipid Profile due??08/13/20??Variable frequency  ??? ? ? ?Diabetes Maintenance-Eye Exam due??08/13/20??and every?  ??? ? ? ?Diabetes Maintenance-Foot Exam  due??08/13/20??and every?  ??? ? ? ?Influenza Vaccine due??08/13/20??and every?  ??? ? ? ?Medicare Annual Wellness Exam due??08/13/20??and every 1??year(s)  ??? ? ? ?Pneumococcal Vaccine due??08/13/20??and every?  ??? ? ? ?Tetanus Vaccine due??08/13/20??and every 10??year(s)  ??? ? ? ?Zoster Vaccine due??08/13/20??and every?  ??? ??Due In Future?  ??? ? ? ?Obesity Screening not due until??01/01/21??and every 1??year(s)  ??? ? ? ?Advance Directive not due until??01/02/21??and every 1??year(s)  ??? ? ? ?Fall Risk Assessment not due until??01/02/21??and every 1??year(s)  ??? ? ? ?Functional Assessment not due until??01/02/21??and every 1??year(s)  ??? ? ? ?Aspirin Therapy for CVD Prevention not due until??04/14/21??and every 1??year(s)  ??? ? ? ?ADL Screening not due until??04/15/21??and every 1??year(s)  ??? ? ? ?Depression Screening not due until??06/04/21??and every 1??year(s)  ??? ? ? ?Hypertension Management-BMP not due until??07/28/21??and every 1??year(s)  ??? ? ? ?Diabetes Maintenance-Serum Creatinine not due until??07/28/21??and every 1??year(s)  ??? ? ? ?Body Mass Index Check not due until??08/11/21??and every 1??year(s)  ???Satisfied?(in the past 1 year)  ??? ??Satisfied?  ??? ? ? ?ADL Screening on??04/15/20.??Satisfied by Helga Callejas RN  ??? ? ? ?Advance Directive on??08/11/20.??Satisfied by Lyly Batres RN  ??? ? ? ?Aspirin Therapy for CVD Prevention on??04/14/20.??Satisfied by Lester GUEVARA, Cecily Sorto  ??? ? ? ?Blood Pressure Screening on??08/13/20.??Satisfied by [facility] GUEVARA, Lottie Rivera  ??? ? ? ?Body Mass Index Check on??08/11/20.??Satisfied by Lyly Batres RN  ??? ? ? ?Depression Screening on??06/04/20.??Satisfied by Christopher Prakash LPN  ??? ? ? ?Diabetes Maintenance-Serum Creatinine on??07/28/20.??Satisfied by Jossie Calderón  ??? ? ? ?Diabetes Screening on??07/28/20.??Satisfied by Jossie Calderón  ??? ? ? ?Fall Risk Assessment on??07/28/20.??Satisfied by  Rachel Mathew  ??? ? ? ?Functional Assessment on??08/11/20.??Satisfied by Lyly Batres RN  ??? ? ? ?Hypertension Management-Blood Pressure on??08/13/20.??Satisfied by Lottie Gonzalez RN  ??? ? ? ?Obesity Screening on??08/11/20.??Satisfied by Lyly Batres RN  ?

## 2022-05-03 NOTE — HISTORICAL [facility] CERNER
This is a historical note converted from Lisa. Formatting and pictures may have been removed.  Please reference Lisa for original formatting and attached multimedia. Chief Complaint  R hip wound approximately 1 1/2 mths  History of Present Illness  The patient returns for continued management of her right hip?decubitus ulcer?which was?debrided 1 week ago.? She has been having pain at the wound site?but did not?fill her prescription for Norco as prescribed?on?May 12, 2020.? She also did not receive?chemo at her last oncology visit.  Physical Exam  Vitals & Measurements  T:?37.2? ?C (Oral)? HR:?62(Peripheral)? RR:?18? BP:?158/67? SpO2:?98%?  HT:?155?cm? WT:?49.4?kg?  On examination she has?moderate?yellow eschar?especially along the wound periphery?and centrally there are?multiple sites of granulation tissue at the wound base.  ?  Incision/Wounds  ?1. Hip Right Pressure ulcer?- last charted: 05/19/2020 14:01  ?? ??Assessment Done By?- Wound Care Team  ?? ??Pressure Point?- Bony prominence  ?? ??Dressing Type?- Absorptive, Medicated packing strips  ?? ??Dressing Assessment?- Drainage present, Intact  ?? ??Dressing Activity?- Removed  ?? ??Cleansing?- Cleaned with normal saline  ?? ??Length?- 2.5 cm  ?? ??Width?- 5.3 cm  ?? ??Depth?- 0.3 cm  ?? ??Wound Bed Tissue Type?- Erythema, Granulating, Necrotic tissue, slough, Pale Pink  ?? ??Percent Granulated?- 40 %  ?? ??Percent Necrotic Tissue Slough?- 60 %  ?? ??Pressure Ulcer Stage?- III  ?? ??Exudate Amount?- Moderate  ?? ??Exudate Type?- Serous  ?? ??Exudate Odor?- None  ?? ??Edge?- Well defined  ?? ??Surrounding Tissue Color?- Normal  ?? ??Surrounding Tissue Condition?- Intact  ?  Assessment/Plan  1.?Decubitus ulcer of right hip, unstageable?L89.210  ?Use Norco?for pain management as?prescribed.  2.?Eschar of wound bed?T14.8XXA  Additional?debridement of the?fibrous eschar of the right hip?was made under?4% local lidocaine. ?However,?the patient could not tolerate  the procedure due to pain and?was therefore?aborted.  3.?Malignant neoplasm of head of pancreas?C25.0  ?Follow-up with oncology.  4.?Diabetes mellitus type 2, controlled, without complications?E11.9  ?Continue current therapy.  5.?Malignant cachexia?R64  Orders:  Wound Care Outpatient, *Est. 05/26/20 3:00:00 CDT, *Est. Stop date 05/26/20 3:00:00 CDT, [facility], FU with Physician in 1 week  Wound Care Team Treatment, 05/19/20 14:16:00 CDT, Stop date 05/19/20 14:16:00 CDT, Apply Santyl to right hip wound daily with cover up dressing. Home health will provide surveillance and assistance with wound care. Use Norco as prescribed for pain.  Referrals  Lab Draw, *Est. 05/28/20 8:00:00 CDT, Day 8, Order for future visit, Pancreatic cancer  Agranulocytosis secondary to cancer chemotherapy, [facility]  Lab Draw, *Est. 06/03/20 8:00:00 CDT, Day 1 of Next Cycle, Order for future visit, Pancreatic cancer  Agranulocytosis secondary to cancer chemotherapy, [facility]   Problem List/Past Medical History  Ongoing  Decubitus ulcer of right hip, unstageable  Diabetes mellitus type 2, controlled, without complications  Eschar of wound bed  High blood pressure  Hypercholesterolemia  Hypothyroidism(  Confirmed  )  Malignant cachexia  Malignant neoplasm of head of pancreas  Pressure ulcer of hip  Vitiligo  Historical  No qualifying data  Procedure/Surgical History  Dilation of Common Bile Duct with Intraluminal Device, Via Natural or Artificial Opening Endoscopic (04/15/2020)  Endoscopic Retrograde Cholangiopancreatography (04/15/2020)  Extirpation of Matter from Common Bile Duct, Via Natural or Artificial Opening Endoscopic (04/15/2020)  Pancreatic Stent Placement (04/15/2020)  Catheter Insertion [facility] (.) (03/12/2020)  Fluoroscopy of Superior Vena Cava using Low Osmolar Contrast, Guidance (03/12/2020)  Insertion of Infusion Device into Superior Vena Cava, Percutaneous Approach (03/12/2020)  Insertion of  Totally Implantable Vascular Access Device into Chest Subcutaneous Tissue and Fascia, Open Approach (03/12/2020)  Insertion of tunneled centrally inserted central venous access device, with subcutaneous port; age 5 years or older (03/12/2020)  Biliary Stent (Upper) (02/20/2020)  Dilation of Common Bile Duct with Intraluminal Device, Via Natural or Artificial Opening Endoscopic (02/20/2020)  EGD w/ Fine Needle Biopsy / Aspiration (Upper) (02/20/2020)  Endoscopic retrograde cholangiopancreatography (ERCP); with placement of endoscopic stent into biliary or pancreatic duct, including pre- and post-dilation and guide wire passage, when performed, including sphincterotomy, when performed, each stent (02/20/2020)  Endoscopic Retrograde Cholangiopancreatography (Upper) (02/20/2020)  Endoscopic Ultrasonography (Upper) (02/20/2020)  Biopsy Gastrointestinal (02/13/2020)  Endoscopic Retrograde Cholangiopancreatography (02/13/2020)  Esophagogastroduodenoscopy (02/13/2020)  Esophagogastroduodenoscopy, flexible, transoral; with biopsy, single or multiple (02/13/2020)  Excision of Duodenum, Via Natural or Artificial Opening Endoscopic, Diagnostic (02/13/2020)  bladder sx (2015)  Thyroidectomy (1970)  left arm surgery to remove tumor   Medications  amLODIPine 5 mg oral tablet, 0.5 tab, Oral, Daily  glipiZIDE 10 mg oral tablet, extended release (XL tab), 10 mg= 1 tab(s), Oral, BID  levothyroxine 125 mcg (0.125 mg) oral tablet, 125 mcg= 1 tab(s), Oral, qAM  promethazine 25 mg oral tablet, 25 mg= 1 tab(s), Oral, q6hr  simvastatin 20 mg oral tablet, 20 mg= 1 tab(s), Oral, qPM  Zofran 8 mg oral tablet, 8 mg= 1 tab(s), Oral, q8hr, PRN,? ?Investigating: Not listed as active med at Dr. Lew Rey office and last filled on 3/11/20 (10 day supply) at [facility]  Allergies  No Known Allergies  No Known Medication Allergies  Social History  Abuse/Neglect  No, 05/12/2020  No, 05/07/2020  No, 04/14/2020  No,  04/09/2020  No, 04/01/2020  No, 03/26/2020  Alcohol  Never, 03/05/2020  Employment/School  Unemployed, 03/05/2020  Exercise  Nutrition/Health  Regular, 03/05/2020  Substance Use  Never, 03/05/2020  Tobacco  Never (less than 100 in lifetime), N/A, 05/12/2020  Never (less than 100 in lifetime), N/A, 05/07/2020  Never (less than 100 in lifetime), N/A, 04/09/2020  Never (less than 100 in lifetime), N/A, 04/01/2020  Family History  Diabetes mellitus type 2: Mother.  Hypertension.: Mother.  Malignant neoplasm of cervix: Mother.  Health Maintenance  Health Maintenance  ???Pending?(in the next year)  ??? ??OverDue  ??? ? ? ?Smoking Cessation (Diabetes) due??10/08/17??and every 2??year(s)  ??? ? ? ?Alcohol Misuse Screening due??01/01/20??and every 1??year(s)  ??? ??Due?  ??? ? ? ?Bone Density Screening due??05/19/20??Variable frequency  ??? ? ? ?Breast Cancer Screening (Senior Wellness) due??05/19/20??and every?  ??? ? ? ?Colorectal Screening (Senior Wellness) due??05/19/20??and every?  ??? ? ? ?Diabetes Maintenance-Eye Exam due??05/19/20??and every?  ??? ? ? ?Diabetes Maintenance-Foot Exam due??05/19/20??and every?  ??? ? ? ?Medicare Annual Wellness Exam due??05/19/20??and every 1??year(s)  ??? ? ? ?Pneumococcal Vaccine due??05/19/20??Variable frequency  ??? ? ? ?Pneumococcal Vaccine due??05/19/20??and every?  ??? ? ? ?Tetanus Vaccine due??05/19/20??and every 10??year(s)  ??? ? ? ?Zoster Vaccine due??05/19/20??and every 100??year(s)  ??? ??Due In Future?  ??? ? ? ?Diabetes Maintenance-HgbA1c not due until??06/20/20??and every 1??year(s)  ??? ? ? ?Diabetes Maintenance-Fasting Lipid Profile not due until??10/10/20??and every 1??year(s)  ??? ? ? ?Advance Directive not due until??01/01/21??and every 1??year(s)  ??? ? ? ?Cognitive Screening not due until??01/01/21??and every 1??year(s)  ??? ? ? ?Fall Risk Assessment not due until??01/01/21??and every 1??year(s)  ??? ? ? ?Functional Assessment not due until??01/01/21??and every  1??year(s)  ??? ? ? ?Obesity Screening not due until??01/01/21??and every 1??year(s)  ??? ? ? ?Aspirin Therapy for CVD Prevention not due until??04/14/21??and every 1??year(s)  ??? ? ? ?ADL Screening not due until??04/15/21??and every 1??year(s)  ??? ? ? ?Hypertension Management-Blood Pressure not due until??05/07/21??and every 1??year(s)  ??? ? ? ?Hypertension Management-BMP not due until??05/07/21??and every 1??year(s)  ???Satisfied?(in the past 1 year)  ??? ??Satisfied?  ??? ? ? ?ADL Screening on??04/15/20.??Satisfied by Helga Callejas RN  ??? ? ? ?Advance Directive on??03/27/20.??Satisfied by Lyly Batres RN.  ??? ? ? ?Aspirin Therapy for CVD Prevention on??04/14/20.??Satisfied by Cecily Batista RN  ??? ? ? ?Blood Pressure Screening on??05/19/20.??Satisfied by Lottie Gonzalez RN  ??? ? ? ?Body Mass Index Check on??05/07/20.??Satisfied by Elliott CHO,RD, , LDN, Danette ORTEGA.  ??? ? ? ?Depression Screening on??03/18/20.??Satisfied by Saranya Smith  ??? ? ? ?Diabetes Screening on??05/07/20.??Satisfied by Luh Sultana  ??? ? ? ?Fall Risk Assessment on??04/30/20.??Satisfied by Dressler RN, Paul  ??? ? ? ?Functional Assessment on??05/12/20.??Satisfied by Lottie Gonzalez RN  ??? ? ? ?Hypertension Management-Blood Pressure on??05/19/20.??Satisfied by Lottie Gonzalez RN  ??? ? ? ?Obesity Screening on??05/07/20.??Satisfied by Hill MS,RD, , LDN, Danette ORTEGA.  ?

## 2022-05-03 NOTE — HISTORICAL [facility]
This is a historical note converted from Lisa. Formatting and pictures may have been removed.  Please reference Lisa for original formatting and attached multimedia. Chief Complaint  R hip wound approximately 1 1/2 mths  History of Present Illness  This patient was diagnosed in February 2020?as having?pancreatic CA involving the head of the?pancreas.? She was?felt to be high risk for surgery?and is currently being managed?with chemotherapy utilizing Gemzar and?Abraxane?under the direction of Dr. Jazzy Tucker.? In April 2020?she was hospitalized?with a septic illness. ?She subsequently developed?a sore?which initially appeared to be abrasion overlying the right greater trochanter.? This has persisted and?currently she has a thick scab over that area?which she presents today for further evaluation and treatment.  Review of Systems  Significantly different than the history of present and past medical illnesses.  Physical Exam  Vitals & Measurements  HT:?155?cm? WT:?49.4?kg?  General:?Alert, quiet and appears cachectic; appropriately responsive to questions and commands and?in no acute distress.  Eye: PERRLA,?extraocular movements are full; ?clear conjunctivae.  HENT:?Normal?cephalic?and atraumatic:?The nose is unremarkable.? The oral pharyngeal examination is benign.  Neck:?Supple;?there is an anterior neck surgical scar?compatible with her history of?thyroid surgery. ?There is no evidence of thyromegaly?or abnormal masses.? There is no evidence of bruits.  Chest:?Symmetrical?and atraumatic.??  Respiratory:?Clear to auscultation bilaterally.  Cardiovascular:?Regular rhythm; without murmurs, gallops or ectopy.? There is trace edema involving the lower extremities.  Gastrointestinal:?Soft, non-tender ; non-distended with normal bowel sounds; without masses to palpation.  Genitourinary:?Without CVA tenderness to fist?percussion .? The bladder is not palpable.  Musculoskeletal:?Full range of motion of all  extremities/spine and?without limitation or discomfort  Neurologic:?Physiological.  Lymphatics: No evidence of lymphedema or lymph node enlargement  Psyche :?Quiet. ?There is no evidence of thought preoccupation?or abnormal behavior.  Skin :?There is an eschar over the?right greater trochanter. ?There is no evidence of associated fluctuance or erythema.  ?   There is evidence of extensive?vitiligo in his changes.  ?   Incision/Wounds  ?1. Hip Right Pressure ulcer?- last charted: 05/12/2020 13:00  ?? ??Assessment Done By?- Wound Care Team  ?? ??Pressure Point?- Bony prominence  ?? ??Dressing Type?- Absorptive  ?? ??Dressing Assessment?- Drainage present, Intact  ?? ??Dressing Activity?- Removed  ?? ??Cleansing?- Cleaned with normal saline  ?? ??Length?- 2.8 cm  ?? ??Width?- 4.5 cm  ?? ??Depth?- 0.1 cm  ?? ??Wound Bed Tissue Type?- Necrotic tissue, eschar, Necrotic tissue, slough, Pale Pink  ?? ??Percent Granulated?- 10 %  ?? ??Percent Necrotic Tissue Slough?- 10 %  ?? ??Percent Necrotic Tissue Eschar?- 80 %  ?? ??Pressure Ulcer Stage?- Unstageable  ?? ??Exudate Amount?- Scant  ?? ??Exudate Type?- Serous  ?? ??Exudate Odor?- None  ?? ??Edge?- Well defined  ?? ??Surrounding Tissue Color?- Erythema  ?? ??Surrounding Tissue Condition?- Denuded  ?  ?  ?  Assessment/Plan  1.?Decubitus ulcer of right hip, unstageable?L89.210  ?After obtaining informed consent?the patient underwent?surgical debridement?(escharectomy)?of her right?hip?decubitus ulcer?under infiltrated 1% lidocaine?and?using a #15 blade and pickups.??Debridement was carried down to subcutaneous tissue.??The postoperative measurement was 2.8 x 5.2 x 0.4 cm.? There was moderate?bleeding?which was controlled with pressure?and the application of Omnistat?and a dry pressure dressing.? She has been instructed to?allow the dressing to remain in place for 48 hours. ?Home health will be consulted to assist with local wound care. ?She will follow-up in?1  week.  ?  ?  2.?Malignant neoplasm of head of pancreas?C25.0  ?Follow-up with Dr. Tucker for ongoing?chemotherapy.  3.?Diabetes mellitus type 2, controlled, without complications?E11.9  ?Tight control of diabetes is recommended.  4.?Malignant cachexia?R64  5.?Vitiligo?L80  Orders:  Wound Care Outpatient, *Est. 05/19/20 3:00:00 CDT, *Est. Stop date 05/19/20 3:00:00 CDT, [facility], FU with Physician in 1 week  Wound Care Team Treatment, 05/12/20 14:48:00 CDT, Stop date 05/12/20 14:48:00 CDT, Allow current dressing to remain in place- currently consisting of Omnistat for hemostasis and a dry dressing overlay. Home health will visit in 48 hours and initiate use of Santyl dressing.   Problem List/Past Medical History  Ongoing  Decubitus ulcer of right hip, unstageable  Diabetes mellitus type 2, controlled, without complications  High blood pressure  Hypercholesterolemia  Hypothyroidism(  Confirmed  )  Malignant cachexia  Malignant neoplasm of head of pancreas  Pressure ulcer of hip  Vitiligo  Historical  No qualifying data  Procedure/Surgical History  Dilation of Common Bile Duct with Intraluminal Device, Via Natural or Artificial Opening Endoscopic (04/15/2020)  Endoscopic Retrograde Cholangiopancreatography (04/15/2020)  Extirpation of Matter from Common Bile Duct, Via Natural or Artificial Opening Endoscopic (04/15/2020)  Pancreatic Stent Placement (04/15/2020)  Catheter Insertion [facility] (.) (03/12/2020)  Fluoroscopy of Superior Vena Cava using Low Osmolar Contrast, Guidance (03/12/2020)  Insertion of Infusion Device into Superior Vena Cava, Percutaneous Approach (03/12/2020)  Insertion of Totally Implantable Vascular Access Device into Chest Subcutaneous Tissue and Fascia, Open Approach (03/12/2020)  Insertion of tunneled centrally inserted central venous access device, with subcutaneous port; age 5 years or older (03/12/2020)  Biliary Stent (Upper) (02/20/2020)  Dilation of Common Bile Duct with  Intraluminal Device, Via Natural or Artificial Opening Endoscopic (02/20/2020)  EGD w/ Fine Needle Biopsy / Aspiration (Upper) (02/20/2020)  Endoscopic retrograde cholangiopancreatography (ERCP); with placement of endoscopic stent into biliary or pancreatic duct, including pre- and post-dilation and guide wire passage, when performed, including sphincterotomy, when performed, each stent (02/20/2020)  Endoscopic Retrograde Cholangiopancreatography (Upper) (02/20/2020)  Endoscopic Ultrasonography (Upper) (02/20/2020)  Biopsy Gastrointestinal (02/13/2020)  Endoscopic Retrograde Cholangiopancreatography (02/13/2020)  Esophagogastroduodenoscopy (02/13/2020)  Esophagogastroduodenoscopy, flexible, transoral; with biopsy, single or multiple (02/13/2020)  Excision of Duodenum, Via Natural or Artificial Opening Endoscopic, Diagnostic (02/13/2020)  bladder sx (2015)  Thyroidectomy (1970)  left arm surgery to remove tumor   Medications  amLODIPine 5 mg oral tablet, 0.5 tab, Oral, Daily  glipiZIDE 10 mg oral tablet, extended release (XL tab), 10 mg= 1 tab(s), Oral, BID  levothyroxine 125 mcg (0.125 mg) oral tablet, 125 mcg= 1 tab(s), Oral, qAM  promethazine 25 mg oral tablet, 25 mg= 1 tab(s), Oral, q6hr  simvastatin 20 mg oral tablet, 20 mg= 1 tab(s), Oral, qPM  Zofran 8 mg oral tablet, 8 mg= 1 tab(s), Oral, q8hr, PRN,? ?Investigating: Not listed as active med at Dr. Lew Rey office and last filled on 3/11/20 (10 day supply) at [facility]  Allergies  No Known Allergies  No Known Medication Allergies  Social History  Abuse/Neglect  No, 05/12/2020  No, 05/07/2020  No, 04/14/2020  No, 04/09/2020  No, 04/01/2020  No, 03/26/2020  Alcohol  Never, 03/05/2020  Employment/School  Unemployed, 03/05/2020  Exercise  Nutrition/Health  Regular, 03/05/2020  Substance Use  Never, 03/05/2020  Tobacco  Never (less than 100 in lifetime), N/A, 05/12/2020  Never (less than 100 in lifetime), N/A, 05/07/2020  Never  (less than 100 in lifetime), N/A, 04/09/2020  Never (less than 100 in lifetime), N/A, 04/01/2020  Family History  Diabetes mellitus type 2: Mother.  Hypertension.: Mother.  Malignant neoplasm of cervix: Mother.  Health Maintenance  Health Maintenance  ???Pending?(in the next year)  ??? ??OverDue  ??? ? ? ?Smoking Cessation (Diabetes) due??10/08/17??and every 2??year(s)  ??? ? ? ?Alcohol Misuse Screening due??01/01/20??and every 1??year(s)  ??? ? ? ?Geriatric Depression Screening due??01/01/20??and every 1??year(s)  ??? ??Due?  ??? ? ? ?Bone Density Screening due??05/12/20??Variable frequency  ??? ? ? ?Breast Cancer Screening (Senior Wellness) due??05/12/20??and every?  ??? ? ? ?Colorectal Screening (Senior Wellness) due??05/12/20??and every?  ??? ? ? ?Diabetes Maintenance-Eye Exam due??05/12/20??and every?  ??? ? ? ?Diabetes Maintenance-Foot Exam due??05/12/20??and every?  ??? ? ? ?Medicare Annual Wellness Exam due??05/12/20??and every 1??year(s)  ??? ? ? ?Pneumococcal Vaccine due??05/12/20??Variable frequency  ??? ? ? ?Pneumococcal Vaccine due??05/12/20??and every?  ??? ? ? ?Tetanus Vaccine due??05/12/20??and every 10??year(s)  ??? ? ? ?Zoster Vaccine due??05/12/20??and every 100??year(s)  ??? ??Due In Future?  ??? ? ? ?Diabetes Maintenance-HgbA1c not due until??06/20/20??and every 1??year(s)  ??? ? ? ?Diabetes Maintenance-Fasting Lipid Profile not due until??10/10/20??and every 1??year(s)  ??? ? ? ?Advance Directive not due until??01/01/21??and every 1??year(s)  ??? ? ? ?Cognitive Screening not due until??01/01/21??and every 1??year(s)  ??? ? ? ?Fall Risk Assessment not due until??01/01/21??and every 1??year(s)  ??? ? ? ?Functional Assessment not due until??01/01/21??and every 1??year(s)  ??? ? ? ?Obesity Screening not due until??01/01/21??and every 1??year(s)  ??? ? ? ?Aspirin Therapy for CVD Prevention not due until??04/14/21??and every 1??year(s)  ??? ? ? ?ADL Screening not due until??04/15/21??and every  1??year(s)  ??? ? ? ?Hypertension Management-Blood Pressure not due until??05/07/21??and every 1??year(s)  ??? ? ? ?Hypertension Management-BMP not due until??05/07/21??and every 1??year(s)  ???Satisfied?(in the past 1 year)  ??? ??Satisfied?  ??? ? ? ?ADL Screening on??04/15/20.??Satisfied by Júnior GUEVARA, Helga Guo  ??? ? ? ?Advance Directive on??03/27/20.??Satisfied by Medardo GUEVARA, Lyly HAM  ??? ? ? ?Aspirin Therapy for CVD Prevention on??04/14/20.??Satisfied by Lester GUEVARA, Cecily Sorto  ??? ? ? ?Blood Pressure Screening on??05/07/20.??Satisfied by Christopher Prakahs LPN.  ??? ? ? ?Body Mass Index Check on??05/07/20.??Satisfied by Elliott CHO,JANEE, , ZAKIYA, Danette T.  ??? ? ? ?Depression Screening on??03/18/20.??Satisfied by Saranya Smith  ??? ? ? ?Diabetes Screening on??05/07/20.??Satisfied by Luh Sultana  ??? ? ? ?Fall Risk Assessment on??04/30/20.??Satisfied by Dressler RN, Paul  ??? ? ? ?Functional Assessment on??05/12/20.??Satisfied by [place] GUEVARA, Lottie Rivera  ??? ? ? ?Hypertension Management-Blood Pressure on??05/07/20.??Satisfied by Christopher Prakash LPN M.  ??? ? ? ?Obesity Screening on??05/07/20.??Satisfied by Elliott CHO,JANEE, , MARCELAN, Danette T.  ?

## 2022-05-03 NOTE — HISTORICAL [facility]
This is a historical note converted from Lisa. Formatting and pictures may have been removed.  Please reference Lisa for original formatting and attached multimedia. Chief Complaint  R hip wound approximately 1 1/2 mths  History of Present Illness  see nurse notes  Review of Systems  see nurse notes  Physical Exam  Vitals & Measurements  T:?36.4? ?C (Oral)? HR:?62(Peripheral)? RR:?20? BP:?133/77? SpO2:?100%?  HT:?155?cm? WT:?49.4?kg?  see nurse notes  Assessment/Plan  1.?Diabetes mellitus type 2, controlled, without complications?E11.9  2.?Decubitus ulcer of right hip, unstageable?L89.210  ?Incision/Wounds  ?1. Hip Right Pressure ulcer?- last charted: 08/20/2020 15:22  ?? ??Assessment Done By?- Wound Care Team  ?? ??Pressure Point?- Bony prominence  ?? ??Dressing Type?- Collagen, Foam  ?? ??Dressing Assessment?- Drainage present, Intact  ?? ??Dressing Activity?- Changed  ?? ??Cleansing?- Cleaned with normal saline  ?? ??Length?- 0.7 cm  ?? ??Width?- 2.0 cm  ?? ??Depth?- 0.1 cm  ?? ??Wound Bed Tissue Type?- Epithelialized, Granulating, Hypergranular, Scab  ?? ??Pressure Ulcer Stage?- III  ?? ??Exudate Amount?- Small  ?? ??Exudate Type?- Serous  ?? ??Exudate Odor?- None  ?? ??Edge?- Well defined  ?? ??Surrounding Tissue Color?- Erythema  ?? ??Surrounding Tissue Condition?- Intact  ?? ??Status?- Improving  ?Pt examined and notes review above. Wound is smaller and healling good. Plans is to do adaptive with foam QOD. RTC in 1 week  3.?Eschar of wound bed?T14.8XXA  4.?Malignant neoplasm of head of pancreas?C25.0  5.?Pressure ulcer of right buttock, stage 2?L89.312  6.?Primary pancreatic cancer?C25.9  Orders:  Wound Care Outpatient, *Est. 08/27/20 14:00:00 CDT, *Est. Stop date 08/27/20 14:00:00 CDT, [facility], Return to Clinic 2 weeks  Wound Care Outpatient, *Est. 08/27/20 3:00:00 CDT, *Est. Stop date 08/27/20 3:00:00 CDT, [facility], FU with Physician in 1 week  Wound Care Team Treatment, 08/20/20  15:38:00 CDT, Stop date 08/20/20 15:38:00 CDT, CONTINUE ADAPTIVE AND FOAM QOD. RTC in 1 week  Referrals  Discharge Follow Up, Specialty: General Surgery, Reason: follow up bucky, Refer To: Olinda JEFFERSON, Shiva WIGN, [facility] S[facility]. Medical Specialist: Marla Garza, [address], [phone]., Start: 07/18/20 14:14:00 CDT  Infusion Visit, 06/11/20 12:00:00 CDT, Order for future visit  Lab Draw, 06/04/20 10:30:00 CDT  Lab Draw, 06/10/20 15:30:00 CDT  Lab Draw, 06/04/20 8:00:00 CDT, Day 8, Pancreatic cancer  Agranulocytosis secondary to cancer chemotherapy, [facility]  Nutrition Request (in clinic), 06/04/20 10:45:00 CDT, [facility]  Referral to Gastroenterology, Elevated LFTs and bilirubin, Referred to Provider Dr. Youssef, Patient known to him., Malignant neoplasm of head of pancreas  Decubitus ulcer of right hip, unstageable  Agranulocytosis secondary to cancer chemotherapy  Anemia  Treatment/Doctor Visit, 06/04/20 11:00:00 CDT  Treatment/Doctor Visit, 07/28/20 10:30:00 CDT, Malignant neoplasm of head of pancreas  Decubitus ulcer of right hip, unstageable, [facility]  Treatment/Doctor Visit, 05/21/20 14:30:00 CDT, Pancreatic cancer  Agranulocytosis secondary to cancer chemotherapy, [facility]   Problem List/Past Medical History  Ongoing  Decubitus ulcer of right hip, unstageable  Diabetes mellitus  Diabetes mellitus type 2, controlled, without complications  Eschar of wound bed  High blood pressure  Hypercholesterolemia  Hypothyroidism(  Confirmed  )  Liver masses  Malignant cachexia  Malignant neoplasm of head of pancreas  Pressure ulcer of hip  Vitiligo  Historical  No qualifying data  Procedure/Surgical History  Bypass Common Bile Duct to Small Intestine, Open Approach (07/13/2020)  Bypass Stomach to Jejunum, Open Approach (07/13/2020)  Excision of Liver, Open Approach, Diagnostic (07/13/2020)  Excision of Right Lobe Liver, Open Approach, Diagnostic (07/13/2020)  Resection of  Gallbladder, Open Approach (07/13/2020)  Whipple Procedure (.) (07/13/2020)  Dilation of Common Bile Duct with Intraluminal Device, Via Natural or Artificial Opening Endoscopic (04/15/2020)  Endoscopic Retrograde Cholangiopancreatography (04/15/2020)  Extirpation of Matter from Common Bile Duct, Via Natural or Artificial Opening Endoscopic (04/15/2020)  Pancreatic Stent Placement (04/15/2020)  Catheter Insertion Mediport (.) (03/12/2020)  Fluoroscopy of Superior Vena Cava using Low Osmolar Contrast, Guidance (03/12/2020)  Insertion of Infusion Device into Superior Vena Cava, Percutaneous Approach (03/12/2020)  Insertion of Totally Implantable Vascular Access Device into Chest Subcutaneous Tissue and Fascia, Open Approach (03/12/2020)  Insertion of tunneled centrally inserted central venous access device, with subcutaneous port; age 5 years or older (03/12/2020)  Biliary Stent (Upper) (02/20/2020)  Dilation of Common Bile Duct with Intraluminal Device, Via Natural or Artificial Opening Endoscopic (02/20/2020)  EGD w/ Fine Needle Biopsy / Aspiration (Upper) (02/20/2020)  Endoscopic retrograde cholangiopancreatography (ERCP); with placement of endoscopic stent into biliary or pancreatic duct, including pre- and post-dilation and guide wire passage, when performed, including sphincterotomy, when performed, each stent (02/20/2020)  Endoscopic Retrograde Cholangiopancreatography (Upper) (02/20/2020)  Endoscopic Ultrasonography (Upper) (02/20/2020)  Biopsy Gastrointestinal (02/13/2020)  Endoscopic Retrograde Cholangiopancreatography (02/13/2020)  Esophagogastroduodenoscopy (02/13/2020)  Esophagogastroduodenoscopy, flexible, transoral; with biopsy, single or multiple (02/13/2020)  Excision of Duodenum, Via Natural or Artificial Opening Endoscopic, Diagnostic (02/13/2020)  bladder sx (2015)  Thyroidectomy (1970)  left arm surgery to remove tumor   Medications  acetaminophen-hydrocodone 325 mg-10 mg oral tablet, Oral,  q4-6hr  amlodipine 5 mg oral tablet, 5 mg= 1 tab(s), Oral, BID  amlodipine 5 mg oral tablet, 5 mg= 1 tab(s), Oral, BID  Creon 6000 units oral delayed release capsule, 1 cap(s), Oral, QID  glipiZIDE 10 mg oral tablet, extended release (XL tab), 10 mg= 1 tab(s), Oral, BID  levothyroxine 150 mcg (0.15 mg) oral capsule, 150 mcg= 1 cap(s), Oral, Daily  levothyroxine 150 mcg (0.15 mg) oral tablet, 150 mcg= 1 tab(s), Oral, Daily  megestrol 40 mg/mL oral suspension, 800 mg= 20 mL, Oral, Daily, 6 refills  promethazine 25 mg oral tablet, 25 mg= 1 tab(s), Oral, q6hr  Zofran 8 mg oral tablet, 8 mg= 1 tab(s), Oral, q8hr, PRN  Allergies  No Known Allergies  No Known Medication Allergies  Social History  Abuse/Neglect  No, 08/11/2020  Alcohol  Never, 03/05/2020  Employment/School  Unemployed, 03/05/2020  Exercise  Nutrition/Health  Regular, 03/05/2020  Spiritual/Cultural  [place], 07/06/2020  Substance Use  Never, 03/05/2020  Tobacco  Never (less than 100 in lifetime), N/A, 08/11/2020  Family History  Diabetes mellitus type 2: Mother.  Hypertension.: Mother.  Malignant neoplasm of cervix: Mother.  Health Maintenance  Health Maintenance  ???Pending?(in the next year)  ??? ??OverDue  ??? ? ? ?Diabetes Maintenance-Microalbumin due??and every?  ??? ? ? ?Alcohol Misuse Screening due??01/02/20??and every 1??year(s)  ??? ? ? ?Cognitive Screening due??01/02/20??and every 1??year(s)  ??? ? ? ?Diabetes Maintenance-HgbA1c due??06/20/20??and every 1??year(s)  ??? ??Due?  ??? ? ? ?Bone Density Screening due??08/20/20??Variable frequency  ??? ? ? ?Breast Cancer Screening due??08/20/20??and every?  ??? ? ? ?Colorectal Screening due??08/20/20??and every?  ??? ? ? ?Diabetes Maintenance-Fasting Lipid Profile due??08/20/20??Variable frequency  ??? ? ? ?Diabetes Maintenance-Eye Exam due??08/20/20??and every?  ??? ? ? ?Diabetes Maintenance-Foot Exam due??08/20/20??and every?  ??? ? ? ?Influenza Vaccine due??08/20/20??and every?  ??? ? ? ?Medicare  Annual Wellness Exam due??08/20/20??and every 1??year(s)  ??? ? ? ?Pneumococcal Vaccine due??08/20/20??and every?  ??? ? ? ?Tetanus Vaccine due??08/20/20??and every 10??year(s)  ??? ? ? ?Zoster Vaccine due??08/20/20??and every?  ??? ??Due In Future?  ??? ? ? ?Obesity Screening not due until??01/01/21??and every 1??year(s)  ??? ? ? ?Advance Directive not due until??01/02/21??and every 1??year(s)  ??? ? ? ?Fall Risk Assessment not due until??01/02/21??and every 1??year(s)  ??? ? ? ?Functional Assessment not due until??01/02/21??and every 1??year(s)  ??? ? ? ?Aspirin Therapy for CVD Prevention not due until??04/14/21??and every 1??year(s)  ??? ? ? ?ADL Screening not due until??04/15/21??and every 1??year(s)  ??? ? ? ?Depression Screening not due until??06/04/21??and every 1??year(s)  ??? ? ? ?Hypertension Management-BMP not due until??07/28/21??and every 1??year(s)  ??? ? ? ?Diabetes Maintenance-Serum Creatinine not due until??07/28/21??and every 1??year(s)  ??? ? ? ?Body Mass Index Check not due until??08/11/21??and every 1??year(s)  ???Satisfied?(in the past 1 year)  ??? ??Satisfied?  ??? ? ? ?ADL Screening on??04/15/20.??Satisfied by Helga Callejas RN  ??? ? ? ?Advance Directive on??08/11/20.??Satisfied by Lyly Batres RN  ??? ? ? ?Aspirin Therapy for CVD Prevention on??04/14/20.??Satisfied by Lester GUEVARA, Cecily Sorto  ??? ? ? ?Blood Pressure Screening on??08/20/20.??Satisfied by [place] GUEVARA, Lottie Rivera  ??? ? ? ?Body Mass Index Check on??08/11/20.??Satisfied by Lyly Batres RN  ??? ? ? ?Depression Screening on??06/04/20.??Satisfied by Christopher Prakash LPN  ??? ? ? ?Diabetes Maintenance-Serum Creatinine on??07/28/20.??Satisfied by Jossie Calderón  ??? ? ? ?Diabetes Screening on??07/28/20.??Satisfied by Jossie Calderón  ??? ? ? ?Fall Risk Assessment on??07/28/20.??Satisfied by Rachel Mathew  ??? ? ? ?Functional Assessment on??08/11/20.??Satisfied by Lyly Batres RN  ???  ? ? ?Hypertension Management-Blood Pressure on??08/20/20.??Satisfied by [place] GUEVARA, Lottie Rivera  ??? ? ? ?Obesity Screening on??08/11/20.??Satisfied by Medardo GUEVARA, Lyly HAM  ?

## 2022-05-03 NOTE — HISTORICAL [facility]
This is a historical note converted from Lisa. Formatting and pictures may have been removed.  Please reference Lisa for original formatting and attached multimedia. Chief Complaint  R hip wound approximately 1 1/2 mths  History of Present Illness  see nurse notes  Review of Systems  see nurse notes  Physical Exam  Vitals & Measurements  T:?37.1? ?C (Oral)? HR:?78(Peripheral)? RR:?20? BP:?116/70? SpO2:?100%?  HT:?155?cm? WT:?49.4?kg?  Assessment/Plan  1.?Diabetes mellitus type 2, controlled, without complications?E11.9  2.?Decubitus ulcer of right hip, unstageable?L89.210  ?Incision/Wounds  ?1. Hip Right Pressure ulcer?- last charted: 07/23/2020 13:16  ?? ??Assessment Done By?- Wound Care Team  ?? ??Pressure Point?- Bony prominence  ?? ??Dressing Type?- Foam  ?? ??Dressing Assessment?- Drainage present, Intact  ?? ??Dressing Activity?- Changed  ?? ??Cleansing?- Cleaned with normal saline  ?? ??Length?- 0.3 cm  ?? ??Width?- 0.5 cm  ?? ??Depth?- 0.1 cm  ?? ??Wound Bed Tissue Type?- Granulating, Pale Pink  ?? ??Percent Granulated?- 100 %  ?? ??Pressure Ulcer Stage?- III  ?? ??Exudate Amount?- Scant  ?? ??Exudate Type?- Serous  ?? ??Exudate Odor?- None  ?? ??Edge?- Well defined  ?? ??Surrounding Tissue Color?- Erythema  ?? ??Surrounding Tissue Condition?- Intact  ?? ??Status?- Improving  ?  ?2. Buttock Right Pressure ulcer?- last charted: 07/23/2020 13:16  ?? ??Assessment Done By?- Wound Care Team  ?? ??Dressing Type?- None  ?? ??Cleansing?- Cleaned with normal saline  ?? ??Wound Bed Tissue Type?- Epithelialized, Erythema, Erythema, nonblanchable  ?? ??Pressure Ulcer Stage?- II  ?? ??Exudate Amount?- None  ?? ??Exudate Odor?- None  ?? ??Edge?- Poorly defined  ?? ??Surrounding Tissue Color?- Erythema  ?? ??Surrounding Tissue Condition?- Intact  ?  ?3. Abdomen Transverse Surgical incision?- last charted: 07/23/2020 13:16  ?? ??Assessment Done By?- Wound Care Team  ?? ??Dressing Type?- None  ?? ??Exudate Amount?-  None  ?? ??Edge?- Approximated  ?? ??Surrounding Tissue Color?- Normal  ?? ??Surrounding Tissue Condition?- Intact  ?Pt examined and review notes.? Wound healed . RTC in 2 weeks. Use foam to cover.  3.?Eschar of wound bed?T14.8XXA  4.?Malignant neoplasm of head of pancreas?C25.0  5.?Pressure ulcer of right buttock, stage 2?L89.312  Orders:  Wound Care Outpatient, *Est. 08/06/20 3:00:00 CDT, *Est. Stop date 08/06/20 3:00:00 CDT, [facility], Return to Clinic 2 weeks  Wound Care Team Treatment, 07/23/20 13:42:00 CDT, Stop date 07/23/20 13:42:00 CDT, cover wound with dressing foam right hip. RTC in 2 weeks. Wound healed.  Referrals  Discharge Follow Up, Specialty: General Surgery, Reason: follow up bucky, Refer To: Olinda JEFFERSON, Shiva WING, [facility]. Medical Specialist: Marla Garza, [address], [id number]., Start: 07/18/20 14:14:00 CDT  Infusion Visit, 06/11/20 12:00:00 CDT, Order for future visit  Lab Draw, 06/04/20 10:30:00 CDT  Lab Draw, 06/10/20 15:30:00 CDT  Lab Draw, 06/04/20 8:00:00 CDT, Day 8, Pancreatic cancer  Agranulocytosis secondary to cancer chemotherapy, [facility]  Nutrition Request (in clinic), 06/04/20 10:45:00 CDT, [facility]  Referral to Gastroenterology, Elevated LFTs and bilirubin, Referred to Provider Dr. Youssef, Patient known to him., Malignant neoplasm of head of pancreas  Decubitus ulcer of right hip, unstageable  Agranulocytosis secondary to cancer chemotherapy  Anemia  Treatment/Doctor Visit, 06/04/20 11:00:00 CDT  Treatment/Doctor Visit, 05/21/20 14:30:00 CDT, Pancreatic cancer  Agranulocytosis secondary to cancer chemotherapy, [facility]   Problem List/Past Medical History  Ongoing  Decubitus ulcer of right hip, unstageable  Diabetes mellitus  Diabetes mellitus type 2, controlled, without complications  Eschar of wound bed  High blood pressure  Hypercholesterolemia  Hypothyroidism(  Confirmed  )  Malignant cachexia  Malignant neoplasm of head  of pancreas  Pressure ulcer of hip  Vitiligo  Historical  No qualifying data  Procedure/Surgical History  Bypass Common Bile Duct to Small Intestine, Open Approach (07/13/2020)  Bypass Stomach to Jejunum, Open Approach (07/13/2020)  Excision of Liver, Open Approach, Diagnostic (07/13/2020)  Excision of Right Lobe Liver, Open Approach, Diagnostic (07/13/2020)  Resection of Gallbladder, Open Approach (07/13/2020)  Whipple Procedure (.) (07/13/2020)  Dilation of Common Bile Duct with Intraluminal Device, Via Natural or Artificial Opening Endoscopic (04/15/2020)  Endoscopic Retrograde Cholangiopancreatography (04/15/2020)  Extirpation of Matter from Common Bile Duct, Via Natural or Artificial Opening Endoscopic (04/15/2020)  Pancreatic Stent Placement (04/15/2020)  Catheter Insertion Mediport (.) (03/12/2020)  Fluoroscopy of Superior Vena Cava using Low Osmolar Contrast, Guidance (03/12/2020)  Insertion of Infusion Device into Superior Vena Cava, Percutaneous Approach (03/12/2020)  Insertion of Totally Implantable Vascular Access Device into Chest Subcutaneous Tissue and Fascia, Open Approach (03/12/2020)  Insertion of tunneled centrally inserted central venous access device, with subcutaneous port; age 5 years or older (03/12/2020)  Biliary Stent (Upper) (02/20/2020)  Dilation of Common Bile Duct with Intraluminal Device, Via Natural or Artificial Opening Endoscopic (02/20/2020)  EGD w/ Fine Needle Biopsy / Aspiration (Upper) (02/20/2020)  Endoscopic retrograde cholangiopancreatography (ERCP); with placement of endoscopic stent into biliary or pancreatic duct, including pre- and post-dilation and guide wire passage, when performed, including sphincterotomy, when performed, each stent (02/20/2020)  Endoscopic Retrograde Cholangiopancreatography (Upper) (02/20/2020)  Endoscopic Ultrasonography (Upper) (02/20/2020)  Biopsy Gastrointestinal (02/13/2020)  Endoscopic Retrograde Cholangiopancreatography  (02/13/2020)  Esophagogastroduodenoscopy (02/13/2020)  Esophagogastroduodenoscopy, flexible, transoral; with biopsy, single or multiple (02/13/2020)  Excision of Duodenum, Via Natural or Artificial Opening Endoscopic, Diagnostic (02/13/2020)  bladder sx (2015)  Thyroidectomy (1970)  left arm surgery to remove tumor   Medications  acetaminophen-hydrocodone 325 mg-10 mg oral tablet, Oral, q4-6hr  amlodipine 5 mg oral tablet, 5 mg= 1 tab(s), Oral, BID  amlodipine 5 mg oral tablet, 5 mg= 1 tab(s), Oral, BID  glipiZIDE 10 mg oral tablet, extended release (XL tab), 10 mg= 1 tab(s), Oral, BID  glipiZIDE 5 mg oral tablet, extended release (XL tab), 10 mg= 2 tab(s), Oral, BID  levothyroxine 150 mcg (0.15 mg) oral capsule, 150 mcg= 1 cap(s), Oral, Daily  levothyroxine 150 mcg (0.15 mg) oral tablet, 150 mcg= 1 tab(s), Oral, Daily  megestrol 40 mg/mL oral suspension, 800 mg= 20 mL, Oral, Daily, 6 refills  promethazine 25 mg oral tablet, 25 mg= 1 tab(s), Oral, q6hr  simvastatin 20 mg oral tablet, 20 mg= 1 tab(s), Oral, qPM  Zofran 8 mg oral tablet, 8 mg= 1 tab(s), Oral, q8hr, PRN  Allergies  No Known Allergies  No Known Medication Allergies  Social History  Abuse/Neglect  No, No, Yes, 07/13/2020  No, No, 07/06/2020  No, 06/17/2020  No, No, Yes, 06/16/2020  No, 06/04/2020  No, 04/14/2020  No, 04/09/2020  No, 04/01/2020  No, 03/26/2020  Alcohol  Never, 03/05/2020  Employment/School  Unemployed, 03/05/2020  Exercise  Nutrition/Health  Regular, 03/05/2020  Spiritual/Cultural  Scientology, 07/06/2020  Substance Use  Never, 03/05/2020  Tobacco  Never (less than 100 in lifetime), No, 07/13/2020  Never (less than 100 in lifetime), N/A, 07/06/2020  Never (less than 100 in lifetime), No, 06/16/2020  Never (less than 100 in lifetime), N/A, 06/04/2020  Never (less than 100 in lifetime), N/A, 05/12/2020  Never (less than 100 in lifetime), N/A, 04/09/2020  Never (less than 100 in lifetime), N/A, 04/01/2020  Family History  Diabetes mellitus type  2: Mother.  Hypertension.: Mother.  Malignant neoplasm of cervix: Mother.  Health Maintenance  Health Maintenance  ???Pending?(in the next year)  ??? ??OverDue  ??? ? ? ?Diabetes Maintenance-Microalbumin due??and every?  ??? ? ? ?Alcohol Misuse Screening due??01/02/20??and every 1??year(s)  ??? ? ? ?Cognitive Screening due??01/02/20??and every 1??year(s)  ??? ? ? ?Diabetes Maintenance-HgbA1c due??06/20/20??and every 1??year(s)  ??? ??Due?  ??? ? ? ?Bone Density Screening due??07/23/20??Variable frequency  ??? ? ? ?Breast Cancer Screening due??07/23/20??and every?  ??? ? ? ?Colorectal Screening due??07/23/20??and every?  ??? ? ? ?Diabetes Maintenance-Fasting Lipid Profile due??07/23/20??Variable frequency  ??? ? ? ?Diabetes Maintenance-Eye Exam due??07/23/20??and every?  ??? ? ? ?Diabetes Maintenance-Foot Exam due??07/23/20??and every?  ??? ? ? ?Influenza Vaccine due??07/23/20??and every?  ??? ? ? ?Medicare Annual Wellness Exam due??07/23/20??and every 1??year(s)  ??? ? ? ?Pneumococcal Vaccine due??07/23/20??and every?  ??? ? ? ?Tetanus Vaccine due??07/23/20??and every 10??year(s)  ??? ? ? ?Zoster Vaccine due??07/23/20??and every?  ??? ??Due In Future?  ??? ? ? ?Obesity Screening not due until??01/01/21??and every 1??year(s)  ??? ? ? ?Advance Directive not due until??01/02/21??and every 1??year(s)  ??? ? ? ?Fall Risk Assessment not due until??01/02/21??and every 1??year(s)  ??? ? ? ?Functional Assessment not due until??01/02/21??and every 1??year(s)  ??? ? ? ?Aspirin Therapy for CVD Prevention not due until??04/14/21??and every 1??year(s)  ??? ? ? ?ADL Screening not due until??04/15/21??and every 1??year(s)  ??? ? ? ?Body Mass Index Check not due until??06/04/21??and every 1??year(s)  ??? ? ? ?Depression Screening not due until??06/04/21??and every 1??year(s)  ??? ? ? ?Blood Pressure Screening not due until??07/07/21??and every 1??year(s)  ??? ? ? ?Hypertension Management-Blood Pressure not due until??07/07/21??and  every 1??year(s)  ??? ? ? ?Hypertension Management-BMP not due until??07/18/21??and every 1??year(s)  ??? ? ? ?Diabetes Maintenance-Serum Creatinine not due until??07/18/21??and every 1??year(s)  ???Satisfied?(in the past 1 year)  ??? ??Satisfied?  ??? ? ? ?ADL Screening on??04/15/20.??Satisfied by Helga Callejas RN  ??? ? ? ?Advance Directive on??03/27/20.??Satisfied by Medardo GUEVARA, Lyly HAM  ??? ? ? ?Aspirin Therapy for CVD Prevention on??04/14/20.??Satisfied by Cecily Batista RN  ??? ? ? ?Blood Pressure Screening on??07/23/20.??Satisfied by Lottie Gonzalez RN  ??? ? ? ?Body Mass Index Check on??07/13/20.??Satisfied by Norma Adame RN  ??? ? ? ?Depression Screening on??06/04/20.??Satisfied by Christopher Prakash LPN  ??? ? ? ?Diabetes Maintenance-Serum Creatinine on??07/18/20.??Satisfied by Jazlyn Scanlon  ??? ? ? ?Diabetes Screening on??07/18/20.??Satisfied by Jazlyn Scanlon  ??? ? ? ?Fall Risk Assessment on??06/04/20.??Satisfied by Christopher Prakash LPN  ??? ? ? ?Functional Assessment on??07/16/20.??Satisfied by Denae Wesley PT  ??? ? ? ?Hypertension Management-Blood Pressure on??07/23/20.??Satisfied by Lottie Gonzalez RN  ??? ? ? ?Obesity Screening on??07/13/20.??Satisfied by Norma Adame RN  ?

## 2022-05-03 NOTE — HISTORICAL [facility]
This is a historical note converted from Lisa. Formatting and pictures may have been removed.  Please reference Lisa for original formatting and attached multimedia. Chief Complaint  R hip wound approximately 1 1/2 mths  History of Present Illness  The patient returns today for continued management of her?right greater trochanteric decubitus ulcer. ?She is currently using?Santyl?for debridement?and?for preparation of her wound bed?in anticipation of?a Cellutome graft application.  Review of Systems  Significantly different than the history of present and past medical illnesses.  Physical Exam  Vitals & Measurements  T:?36.8? ?C (Oral)? HR:?67(Peripheral)? RR:?18? BP:?140/64? SpO2:?97%?  HT:?155?cm? WT:?49.4?kg?  Patient has?excellent granulation tissue deposit overlying her?right greater trochanteric?decubitus ulcer.? A scant serosanguineous drainage is present. ?There is no evidence of infection. ?The periwound area is clean.  ?  Incision/Wounds  ?1. Hip Right Pressure ulcer?- last charted: 06/02/2020 15:22  ?? ??Assessment Done By?- Wound Care Team  ?? ??Pressure Point?- Bony prominence  ?? ??Dressing Type?- Moist saline gauze, Tape  ?? ??Dressing Assessment?- Drainage present, Intact  ?? ??Dressing Activity?- Removed  ?? ??Cleansing?- Cleaned with normal saline  ?? ??Length?- 1.9 cm  ?? ??Width?- 4.6 cm  ?? ??Depth?- 0.1 cm  ?? ??Wound Bed Tissue Type?- Fibrotic, Granulating  ?? ??Percent Granulated?- 95 %  ?? ??Percent Necrotic Tissue Slough?- 5 %  ?? ??Pressure Ulcer Stage?- III  ?? ??Exudate Amount?- Moderate  ?? ??Exudate Type?- Serous  ?? ??Exudate Odor?- None  ?? ??Edge?- Well defined  ?? ??Surrounding Tissue Color?- Normal  ?? ??Surrounding Tissue Condition?- Intact  ?? ??Status?- Improving  ?  Assessment/Plan  1.?Diabetes mellitus type 2, controlled, without complications?E11.9  ?Continue current therapy  2.?Decubitus ulcer of right hip, unstageable?L89.210  ?There has been excellent debridement of  her wound?and?the wound bed?is favorable for?a Cellutome graft application.? Following?an explanation of the?benefits?of grafting?in view of the need for resumption of her chemotherapy?ASAP for?of her pancreatic CA?she is in agreement with?proceeding and consented?to?a?Cellutome graft application.  ?  The?harvester was applied to her left anterior thigh. ?After approximately 30 minutes?epithelial blebs?were?observed?and?transected using the?harvester.? They were transferred on?a Mepilex foam?to the?right?greater trochanteric?wound?and stabilized with Mepitel,?4 x 4s and?Mefix?tape.? The patient was advised to allow the graft to remain in place for 7 days at which time she will return?for?reevaluation.  3.?Eschar of wound bed?T14.8XXA  ?Resolved.  4.?Malignant neoplasm of head of pancreas?C25.0  ?As per oncology.  Orders:  Wound Care Outpatient, *Est. 06/09/20 3:00:00 CDT, *Est. Stop date 06/09/20 3:00:00 CDT, [facility], FU with Physician in 1 week  Wound Care Team Treatment, 06/02/20 16:10:00 CDT, Stop date 06/02/20 16:10:00 CDT, Leave right hip dressing in place for 1 week. Call if there is any significant increase in pain or evidence of increased drainage at the dressing site.  Referrals  Lab Draw, 06/04/20 10:30:00 CDT, Order for future visit  Lab Draw, 06/04/20 8:00:00 CDT, Day 8, Pancreatic cancer  Agranulocytosis secondary to cancer chemotherapy, [facility]  Nutrition Request (in clinic), 06/04/20 10:45:00 CDT, Order for future visit, [facility]  Treatment/Doctor Visit, 06/04/20 11:00:00 CDT, Order for future visit  Treatment/Doctor Visit, 05/21/20 14:30:00 CDT, Pancreatic cancer  Agranulocytosis secondary to cancer chemotherapy, [facility]   Problem List/Past Medical History  Ongoing  Decubitus ulcer of right hip, unstageable  Diabetes mellitus type 2, controlled, without complications  Eschar of wound bed  High blood pressure  Hypercholesterolemia  Hypothyroidism(  Confirmed   )  Malignant cachexia  Malignant neoplasm of head of pancreas  Pressure ulcer of hip  Vitiligo  Historical  No qualifying data  Procedure/Surgical History  Dilation of Common Bile Duct with Intraluminal Device, Via Natural or Artificial Opening Endoscopic (04/15/2020)  Endoscopic Retrograde Cholangiopancreatography (04/15/2020)  Extirpation of Matter from Common Bile Duct, Via Natural or Artificial Opening Endoscopic (04/15/2020)  Pancreatic Stent Placement (04/15/2020)  Catheter Insertion Mediport (.) (03/12/2020)  Fluoroscopy of Superior Vena Cava using Low Osmolar Contrast, Guidance (03/12/2020)  Insertion of Infusion Device into Superior Vena Cava, Percutaneous Approach (03/12/2020)  Insertion of Totally Implantable Vascular Access Device into Chest Subcutaneous Tissue and Fascia, Open Approach (03/12/2020)  Insertion of tunneled centrally inserted central venous access device, with subcutaneous port; age 5 years or older (03/12/2020)  Biliary Stent (Upper) (02/20/2020)  Dilation of Common Bile Duct with Intraluminal Device, Via Natural or Artificial Opening Endoscopic (02/20/2020)  EGD w/ Fine Needle Biopsy / Aspiration (Upper) (02/20/2020)  Endoscopic retrograde cholangiopancreatography (ERCP); with placement of endoscopic stent into biliary or pancreatic duct, including pre- and post-dilation and guide wire passage, when performed, including sphincterotomy, when performed, each stent (02/20/2020)  Endoscopic Retrograde Cholangiopancreatography (Upper) (02/20/2020)  Endoscopic Ultrasonography (Upper) (02/20/2020)  Biopsy Gastrointestinal (02/13/2020)  Endoscopic Retrograde Cholangiopancreatography (02/13/2020)  Esophagogastroduodenoscopy (02/13/2020)  Esophagogastroduodenoscopy, flexible, transoral; with biopsy, single or multiple (02/13/2020)  Excision of Duodenum, Via Natural or Artificial Opening Endoscopic, Diagnostic (02/13/2020)  bladder sx (2015)  Thyroidectomy (1970)  left arm surgery to remove tumor    Medications  amLODIPine 5 mg oral tablet, 0.5 tab, Oral, Daily  glipiZIDE 10 mg oral tablet, extended release (XL tab), 10 mg= 1 tab(s), Oral, BID  levothyroxine 125 mcg (0.125 mg) oral tablet, 125 mcg= 1 tab(s), Oral, qAM  promethazine 25 mg oral tablet, 25 mg= 1 tab(s), Oral, q6hr  simvastatin 20 mg oral tablet, 20 mg= 1 tab(s), Oral, qPM  Zofran 8 mg oral tablet, 8 mg= 1 tab(s), Oral, q8hr, PRN,? ?Investigating: Not listed as active med at Dr. Lew Rey office and last filled on 3/11/20 (10 day supply) at [facility]  Allergies  No Known Allergies  No Known Medication Allergies  Social History  Abuse/Neglect  No, 05/21/2020  No, 05/12/2020  No, 04/14/2020  No, 04/09/2020  No, 04/01/2020  No, 03/26/2020  Alcohol  Never, 03/05/2020  Employment/School  Unemployed, 03/05/2020  Exercise  Nutrition/Health  Regular, 03/05/2020  Substance Use  Never, 03/05/2020  Tobacco  Never (less than 100 in lifetime), N/A, 05/21/2020  Never (less than 100 in lifetime), N/A, 05/12/2020  Never (less than 100 in lifetime), N/A, 04/09/2020  Never (less than 100 in lifetime), N/A, 04/01/2020  Family History  Diabetes mellitus type 2: Mother.  Hypertension.: Mother.  Malignant neoplasm of cervix: Mother.  Health Maintenance  Health Maintenance  ???Pending?(in the next year)  ??? ??OverDue  ??? ? ? ?Smoking Cessation (Diabetes) due??10/08/17??and every 2??year(s)  ??? ? ? ?Alcohol Misuse Screening due??01/01/20??and every 1??year(s)  ??? ??Due?  ??? ? ? ?Bone Density Screening due??06/02/20??Variable frequency  ??? ? ? ?Breast Cancer Screening (Senior Wellness) due??06/02/20??and every?  ??? ? ? ?Colorectal Screening (Senior Wellness) due??06/02/20??and every?  ??? ? ? ?Diabetes Maintenance-Eye Exam due??06/02/20??and every?  ??? ? ? ?Diabetes Maintenance-Foot Exam due??06/02/20??and every?  ??? ? ? ?Medicare Annual Wellness Exam due??06/02/20??and every 1??year(s)  ??? ? ? ?Pneumococcal Vaccine  due??06/02/20??Variable frequency  ??? ? ? ?Pneumococcal Vaccine due??06/02/20??and every?  ??? ? ? ?Tetanus Vaccine due??06/02/20??and every 10??year(s)  ??? ? ? ?Zoster Vaccine due??06/02/20??and every 100??year(s)  ??? ??Due In Future?  ??? ? ? ?Diabetes Maintenance-HgbA1c not due until??06/20/20??and every 1??year(s)  ??? ? ? ?Diabetes Maintenance-Fasting Lipid Profile not due until??10/10/20??and every 1??year(s)  ??? ? ? ?Advance Directive not due until??01/01/21??and every 1??year(s)  ??? ? ? ?Cognitive Screening not due until??01/01/21??and every 1??year(s)  ??? ? ? ?Fall Risk Assessment not due until??01/01/21??and every 1??year(s)  ??? ? ? ?Functional Assessment not due until??01/01/21??and every 1??year(s)  ??? ? ? ?Obesity Screening not due until??01/01/21??and every 1??year(s)  ??? ? ? ?Aspirin Therapy for CVD Prevention not due until??04/14/21??and every 1??year(s)  ??? ? ? ?ADL Screening not due until??04/15/21??and every 1??year(s)  ??? ? ? ?Hypertension Management-BMP not due until??05/21/21??and every 1??year(s)  ??? ? ? ?Hypertension Management-Blood Pressure not due until??05/26/21??and every 1??year(s)  ???Satisfied?(in the past 1 year)  ??? ??Satisfied?  ??? ? ? ?ADL Screening on??04/15/20.??Satisfied by Helga Callejas RN  ??? ? ? ?Advance Directive on??03/27/20.??Satisfied by Lyly Batres RN  ??? ? ? ?Aspirin Therapy for CVD Prevention on??04/14/20.??Satisfied by Lester GUEVARA, Cecily Sorto  ??? ? ? ?Blood Pressure Screening on??06/02/20.??Satisfied by Lottie Gonzalez RN  ??? ? ? ?Body Mass Index Check on??05/21/20.??Satisfied by Christopher Prakash LPN  ??? ? ? ?Depression Screening on??03/18/20.??Satisfied by Saranya Smith  ??? ? ? ?Diabetes Screening on??05/07/20.??Satisfied by Luh Sultana  ??? ? ? ?Fall Risk Assessment on??04/30/20.??Satisfied by Dressler RN, Paul  ??? ? ? ?Functional Assessment on??05/12/20.??Satisfied by Lottie Gonzalez RN  ??? ? ?  ?Hypertension Management-Blood Pressure on??06/02/20.??Satisfied by [place] GUEVARA, Lottie Rivera  ??? ? ? ?Obesity Screening on??05/21/20.??Satisfied by Olinda DAMON, Christopher VILLANUEVA  ?

## 2022-05-03 NOTE — HISTORICAL [facility]
This is a historical note converted from Lisa. Formatting and pictures may have been removed.  Please reference Lisa for original formatting and attached multimedia. Chief Complaint  buttocks / sacral ulcer  History of Present Illness  The patient returns for continued management of a coccyx decubitus ulcer.? She offers no new complaints today.? Her left lower rib?cage?is less painful. ?She denies any difficulty with breathing.  Physical Exam  Vitals & Measurements  T:?36.3? ?C (Oral)? HR:?102(Peripheral)? RR:?18? BP:?91/64? SpO2:?100%?  HT:?154?cm? WT:?39.5?kg?  On examination she has a granulating?stage II?decubitus ulcer over the coccyx.  ?  Incision/Wounds  ?1. Coccyx Other: sacrum/coccyx Pressure ulcer?- last charted: 12/08/2020 13:11  ?? ??Assessment Done By?- Wound Care Team  ?? ??Pressure Point?- Bony prominence  ?? ??Dressing Type?- Foam  ?? ??Dressing Assessment?- Drainage present, Intact  ?? ??Dressing Activity?- Changed  ?? ??Cleansing?- Cleaned with normal saline  ?? ??Length?- 0.7 cm  ?? ??Width?- 0.4 cm  ?? ??Depth?- 0.1 cm  ?? ??Wound Bed Tissue Type?- Granulating, Pale Pink  ?? ??Pressure Ulcer Stage?- II  ?? ??Exudate Amount?- Scant  ?? ??Exudate Type?- Serous  ?? ??Exudate Odor?- None  ?? ??Edge?- Well defined  ?? ??Surrounding Tissue Color?- Erythema  ?? ??Surrounding Tissue Condition?- Intact  ?? ??Status?- Improving  ?  Assessment/Plan  1.?Decubital ulcer?L89.90  ?Apply collagen matrix dressing to wound?and?foam overlay.  Ordered:  Wound Care Outpatient, *Est. 01/05/21 3:00:00 CST, *Est. Stop date 01/05/21 3:00:00 CST, [facility], Return to Clinic 4 weeks  Wound Care Team Treatment, 12/08/20 13:23:00 CST, Stop date 12/08/20 13:23:00 CST, Apply collagen matrix dressing to coccyx wound with foam overlay and change every other day as needed.  ?  2.?Malignant neoplasm of head of pancreas?C25.0  ?No significant change.  Ordered:  Wound Care Outpatient, *Est. 01/05/21 3:00:00 CST, *Est.  Stop date 01/05/21 3:00:00 CST, [facility], Return to Clinic 4 weeks  Wound Care Team Treatment, 12/08/20 13:23:00 CST, Stop date 12/08/20 13:23:00 CST, Apply collagen matrix dressing to coccyx wound with foam overlay and change every other day as needed.  ?  3.?Rib contusion?S20.219A  ?Improved.  Ordered:  Wound Care Outpatient, *Est. 01/05/21 3:00:00 CST, *Est. Stop date 01/05/21 3:00:00 CST, [facility], Return to Clinic 4 weeks  Wound Care Team Treatment, 12/08/20 13:23:00 CST, Stop date 12/08/20 13:23:00 CST, Apply collagen matrix dressing to coccyx wound with foam overlay and change every other day as needed.  ?   Problem List/Past Medical History  Ongoing  Decubital ulcer  Decubitus ulcer of right hip, unstageable  Diabetes mellitus  Diabetes mellitus type 2, controlled, without complications  Eschar of wound bed  Fall  High blood pressure  Hypercholesterolemia  Hypothyroidism(  Confirmed  )  Liver masses  Malignant cachexia  Malignant neoplasm of head of pancreas  Pressure ulcer of hip  Rib contusion  Vitiligo  Historical  No qualifying data  Procedure/Surgical History  Bypass Common Bile Duct to Small Intestine, Open Approach (07/13/2020)  Bypass Stomach to Jejunum, Open Approach (07/13/2020)  Excision of Liver, Open Approach, Diagnostic (07/13/2020)  Excision of Right Lobe Liver, Open Approach, Diagnostic (07/13/2020)  Resection of Gallbladder, Open Approach (07/13/2020)  Whipple Procedure (.) (07/13/2020)  Dilation of Common Bile Duct with Intraluminal Device, Via Natural or Artificial Opening Endoscopic (04/15/2020)  Endoscopic Retrograde Cholangiopancreatography (04/15/2020)  Extirpation of Matter from Common Bile Duct, Via Natural or Artificial Opening Endoscopic (04/15/2020)  Pancreatic Stent Placement (04/15/2020)  Catheter Insertion [facility] (.) (03/12/2020)  Fluoroscopy of Superior Vena Cava using Low Osmolar Contrast, Guidance (03/12/2020)  Insertion of Infusion Device into  Superior Vena Cava, Percutaneous Approach (03/12/2020)  Insertion of Totally Implantable Vascular Access Device into Chest Subcutaneous Tissue and Fascia, Open Approach (03/12/2020)  Insertion of tunneled centrally inserted central venous access device, with subcutaneous port; age 5 years or older (03/12/2020)  Biliary Stent (Upper) (02/20/2020)  Dilation of Common Bile Duct with Intraluminal Device, Via Natural or Artificial Opening Endoscopic (02/20/2020)  EGD w/ Fine Needle Biopsy / Aspiration (Upper) (02/20/2020)  Endoscopic retrograde cholangiopancreatography (ERCP); with placement of endoscopic stent into biliary or pancreatic duct, including pre- and post-dilation and guide wire passage, when performed, including sphincterotomy, when performed, each stent (02/20/2020)  Endoscopic Retrograde Cholangiopancreatography (Upper) (02/20/2020)  Endoscopic Ultrasonography (Upper) (02/20/2020)  Biopsy Gastrointestinal (02/13/2020)  Endoscopic Retrograde Cholangiopancreatography (02/13/2020)  Esophagogastroduodenoscopy (02/13/2020)  Esophagogastroduodenoscopy, flexible, transoral; with biopsy, single or multiple (02/13/2020)  Excision of Duodenum, Via Natural or Artificial Opening Endoscopic, Diagnostic (02/13/2020)  bladder sx (2015)  Thyroidectomy (1970)  left arm surgery to remove tumor   Medications  acetaminophen-hydrocodone 325 mg-10 mg oral tablet, Oral, q4-6hr  amlodipine 5 mg oral tablet, 5 mg= 1 tab(s), Oral, BID  amlodipine 5 mg oral tablet, 5 mg= 1 tab(s), Oral, BID  Creon 6000 units oral delayed release capsule, 1 cap(s), Oral, QID  glipiZIDE 10 mg oral tablet, extended release (XL tab), 10 mg= 1 tab(s), Oral, BID  levothyroxine 150 mcg (0.15 mg) oral capsule, 150 mcg= 1 cap(s), Oral, Daily  levothyroxine 150 mcg (0.15 mg) oral tablet, 150 mcg= 1 tab(s), Oral, Daily  megestrol 40 mg/mL oral suspension, 800 mg= 20 mL, Oral, Daily, 6 refills  promethazine 25 mg oral tablet, 25 mg= 1 tab(s), Oral, q6hr  Zofran  8 mg oral tablet, 8 mg= 1 tab(s), Oral, q8hr, PRN  Allergies  No Known Allergies  No Known Medication Allergies  Social History  Abuse/Neglect  No, 11/10/2020  Alcohol  Never, 03/05/2020  Employment/School  Unemployed, 03/05/2020  Exercise  Nutrition/Health  Regular, 03/05/2020  Spiritual/Cultural  M[employer]on, 07/06/2020  Substance Use  Never, 03/05/2020  Tobacco  Never (less than 100 in lifetime), N/A, 11/10/2020  Family History  Diabetes mellitus type 2: Mother.  Hypertension.: Mother.  Malignant neoplasm of cervix: Mother.  Health Maintenance  Health Maintenance  ???Pending?(in the next year)  ??? ??OverDue  ??? ? ? ?Alcohol Misuse Screening due??01/02/20??and every 1??year(s)  ??? ? ? ?Cognitive Screening due??01/02/20??and every 1??year(s)  ??? ? ? ?Diabetes Maintenance-HgbA1c due??06/20/20??and every 1??year(s)  ??? ? ? ?Influenza Vaccine due??10/01/20??and every 1??day(s)  ??? ??Due?  ??? ? ? ?Bone Density Screening due??12/08/20??Variable frequency  ??? ? ? ?Breast Cancer Screening due??12/08/20??Unknown Frequency  ??? ? ? ?Colorectal Screening due??12/08/20??Unknown Frequency  ??? ? ? ?Diabetes Maintenance-Fasting Lipid Profile due??12/08/20??Variable frequency  ??? ? ? ?Diabetes Maintenance-Eye Exam due??12/08/20??Unknown Frequency  ??? ? ? ?Diabetes Maintenance-Foot Exam due??12/08/20??Unknown Frequency  ??? ? ? ?Medicare Annual Wellness Exam due??12/08/20??and every 1??year(s)  ??? ? ? ?Pneumococcal Vaccine due??12/08/20??Unknown Frequency  ??? ? ? ?Tetanus Vaccine due??12/08/20??and every 10??year(s)  ??? ? ? ?Zoster Vaccine due??12/08/20??Unknown Frequency  ??? ??Due In Future?  ??? ? ? ?Obesity Screening not due until??01/01/21??and every 1??year(s)  ??? ? ? ?Advance Directive not due until??01/02/21??and every 1??year(s)  ??? ? ? ?Fall Risk Assessment not due until??01/02/21??and every 1??year(s)  ??? ? ? ?Functional Assessment not due until??01/02/21??and every 1??year(s)  ??? ? ? ?Aspirin Therapy for  CVD Prevention not due until??04/14/21??and every 1??year(s)  ??? ? ? ?ADL Screening not due until??04/15/21??and every 1??year(s)  ??? ? ? ?Depression Screening not due until??06/04/21??and every 1??year(s)  ??? ? ? ?Hypertension Management-BMP not due until??07/28/21??and every 1??year(s)  ??? ? ? ?Diabetes Maintenance-Serum Creatinine not due until??07/28/21??and every 1??year(s)  ??? ? ? ?Body Mass Index Check not due until??08/11/21??and every 1??year(s)  ??? ? ? ?Blood Pressure Screening not due until??11/24/21??and every 1??year(s)  ??? ? ? ?Hypertension Management-Blood Pressure not due until??11/24/21??and every 1??year(s)  ???Satisfied?(in the past 1 year)  ??? ??Satisfied?  ??? ? ? ?ADL Screening on??04/15/20.??Satisfied by Helga Callejas RN  ??? ? ? ?Advance Directive on??08/11/20.??Satisfied by Lyly Batres RN  ??? ? ? ?Aspirin Therapy for CVD Prevention on??04/14/20.??Satisfied by Cecily Batista RN  ??? ? ? ?Blood Pressure Screening on??12/08/20.??Satisfied by Debbie Connell  ??? ? ? ?Body Mass Index Check on??08/11/20.??Satisfied by Lyly Batres RN  ??? ? ? ?Depression Screening on??06/04/20.??Satisfied by Christopher Prakash LPN  ??? ? ? ?Diabetes Maintenance-Serum Creatinine on??07/28/20.??Satisfied by Jossie Calderón  ??? ? ? ?Diabetes Screening on??07/28/20.??Satisfied by Jossie Calderón  ??? ? ? ?Fall Risk Assessment on??07/28/20.??Satisfied by Rachel Mathew  ??? ? ? ?Functional Assessment on??11/10/20.??Satisfied by Lottie Gonzalez RN  ??? ? ? ?Hypertension Management-Blood Pressure on??12/08/20.??Satisfied by Debbie Connell  ??? ? ? ?Influenza Vaccine on??11/10/20.??Satisfied by Lottie Gonzalez RN  ??? ? ? ?Obesity Screening on??08/11/20.??Satisfied by Medardo GUEVARA, Lyly HAM  ?

## 2022-05-03 NOTE — HISTORICAL [facility]
This is a historical note converted from Lisa. Formatting and pictures may have been removed.  Please reference Lisa for original formatting and attached multimedia. Chief Complaint  buttocks / sacral ulcer  History of Present Illness  She returns for management of?sacral?ulcers. ?Reportedly she also sustained a fall?while getting out of the car today?for her?wound care appointment.? She is complaining of pain over the left costochondral area.? Her daughter states that her appetite has been?fair.  Physical Exam  Vitals & Measurements  T:?36.3? ?C (Axillary)? HR:?80(Peripheral)? RR:?22? BP:?116/76? SpO2:?100%?  HT:?154?cm? WT:?39.5?kg?  On examination the patient appears chronically ill. ?She is poorly responsive to?questions and commands.? She has generalized edema.? Her lung fields are clear. ?She is in sinus rhythm.? There is tenderness over the?left?lower costochondral?area.? There is no evidence of?discoloration.? Inspection of her sacral area reveals to?stage II?sacral decubiti without evidence of infection. ?There is a small amount of slough present.  ?  Incision/Wounds  ?1. Coccyx Other: sacrum/coccyx Pressure ulcer?- last charted: 11/10/2020 13:00  ?? ??Assessment Done By?- Wound Care Team  ?? ??Pressure Point?- Bony prominence  ?? ??Dressing Type?- Foam, Honey  ?? ??Dressing Activity?- Applied  ?? ??Cleansing?- Cleaned with soap and water  ?? ??Length?- 2.5 cm  ?? ??Width?- 0.7 cm  ?? ??Depth?- 0.1 cm  ?? ??Wound Bed Tissue Type?- Epithelialized, Erythema, Granulating, Pale Pink  ?? ??Exudate Amount?- Scant  ?? ??Exudate Type?- Serous  ?? ??Exudate Odor?- None  ?? ??Edge?- Well defined  ?? ??Surrounding Tissue Color?- Erythema  ?? ??Surrounding Tissue Condition?- Intact  ?  Assessment/Plan  1.?Decubital ulcer?L89.90  ?Local wound care modalities as ?recommended and documented below.  Ordered:  Wound Care Outpatient, *Est. 11/24/20 13:00:00 CST, *Est. Stop date 11/24/20 13:00:00 CST, [facility],  Return to Clinic 2 weeks  Wound Care Team Treatment, 11/10/20 13:54:00 CST, Stop date 11/10/20 13:54:00 CST, Apply medical honey and foam to wounds and change every other day. Report to ER if rib pain increase.  ?  2.?Malignant neoplasm of head of pancreas?C25.0  ?Follow-up with?surgery and oncology.  Ordered:  Wound Care Team Treatment, 11/10/20 13:54:00 CST, Stop date 11/10/20 13:54:00 CST, Apply medical honey and foam to wounds and change every other day. Report to ER if rib pain increase.  ?  3.?Rib contusion?S20.219A  ?Will observe expectantly.? Report to ER should there be any?difficulty with increase?rib pain?or other unusual?symptoms?suggestive of?intra-abdominal trauma?or perhaps rib fracture.  Ordered:  Wound Care Team Treatment, 11/10/20 13:54:00 CST, Stop date 11/10/20 13:54:00 CST, Apply medical honey and foam to wounds and change every other day. Report to ER if rib pain increase.  ?  4.?Fall?W19.XXXA  ?Noted.  Ordered:  Wound Care Team Treatment, 11/10/20 13:54:00 CST, Stop date 11/10/20 13:54:00 CST, Apply medical honey and foam to wounds and change every other day. Report to ER if rib pain increase.  ?   Problem List/Past Medical History  Ongoing  Decubital ulcer  Decubitus ulcer of right hip, unstageable  Diabetes mellitus  Diabetes mellitus type 2, controlled, without complications  Eschar of wound bed  Fall  High blood pressure  Hypercholesterolemia  Hypothyroidism(  Confirmed  )  Liver masses  Malignant cachexia  Malignant neoplasm of head of pancreas  Pressure ulcer of hip  Rib contusion  Vitiligo  Historical  No qualifying data  Procedure/Surgical History  Bypass Common Bile Duct to Small Intestine, Open Approach (07/13/2020)  Bypass Stomach to Jejunum, Open Approach (07/13/2020)  Excision of Liver, Open Approach, Diagnostic (07/13/2020)  Excision of Right Lobe Liver, Open Approach, Diagnostic (07/13/2020)  Resection of Gallbladder, Open Approach (07/13/2020)  Whipple Procedure (.)  (07/13/2020)  Dilation of Common Bile Duct with Intraluminal Device, Via Natural or Artificial Opening Endoscopic (04/15/2020)  Endoscopic Retrograde Cholangiopancreatography (04/15/2020)  Extirpation of Matter from Common Bile Duct, Via Natural or Artificial Opening Endoscopic (04/15/2020)  Pancreatic Stent Placement (04/15/2020)  Catheter Insertion [facility] (.) (03/12/2020)  Fluoroscopy of Superior Vena Cava using Low Osmolar Contrast, Guidance (03/12/2020)  Insertion of Infusion Device into Superior Vena Cava, Percutaneous Approach (03/12/2020)  Insertion of Totally Implantable Vascular Access Device into Chest Subcutaneous Tissue and Fascia, Open Approach (03/12/2020)  Insertion of tunneled centrally inserted central venous access device, with subcutaneous port; age 5 years or older (03/12/2020)  Biliary Stent (Upper) (02/20/2020)  Dilation of Common Bile Duct with Intraluminal Device, Via Natural or Artificial Opening Endoscopic (02/20/2020)  EGD w/ Fine Needle Biopsy / Aspiration (Upper) (02/20/2020)  Endoscopic retrograde cholangiopancreatography (ERCP); with placement of endoscopic stent into biliary or pancreatic duct, including pre- and post-dilation and guide wire passage, when performed, including sphincterotomy, when performed, each stent (02/20/2020)  Endoscopic Retrograde Cholangiopancreatography (Upper) (02/20/2020)  Endoscopic Ultrasonography (Upper) (02/20/2020)  Biopsy Gastrointestinal (02/13/2020)  Endoscopic Retrograde Cholangiopancreatography (02/13/2020)  Esophagogastroduodenoscopy (02/13/2020)  Esophagogastroduodenoscopy, flexible, transoral; with biopsy, single or multiple (02/13/2020)  Excision of Duodenum, Via Natural or Artificial Opening Endoscopic, Diagnostic (02/13/2020)  bladder sx (2015)  Thyroidectomy (1970)  left arm surgery to remove tumor   Medications  acetaminophen-hydrocodone 325 mg-10 mg oral tablet, Oral, q4-6hr  amlodipine 5 mg oral tablet, 5 mg= 1 tab(s), Oral,  BID  amlodipine 5 mg oral tablet, 5 mg= 1 tab(s), Oral, BID  Creon 6000 units oral delayed release capsule, 1 cap(s), Oral, QID  glipiZIDE 10 mg oral tablet, extended release (XL tab), 10 mg= 1 tab(s), Oral, BID  levothyroxine 150 mcg (0.15 mg) oral capsule, 150 mcg= 1 cap(s), Oral, Daily  levothyroxine 150 mcg (0.15 mg) oral tablet, 150 mcg= 1 tab(s), Oral, Daily  megestrol 40 mg/mL oral suspension, 800 mg= 20 mL, Oral, Daily, 6 refills  promethazine 25 mg oral tablet, 25 mg= 1 tab(s), Oral, q6hr  Zofran 8 mg oral tablet, 8 mg= 1 tab(s), Oral, q8hr, PRN  Allergies  No Known Allergies  No Known Medication Allergies  Social History  Abuse/Neglect  No, 11/10/2020  Alcohol  Never, 03/05/2020  Employment/School  Unemployed, 03/05/2020  Exercise  Nutrition/Health  Regular, 03/05/2020  Spiritual/Cultural  [place], 07/06/2020  Substance Use  Never, 03/05/2020  Tobacco  Never (less than 100 in lifetime), N/A, 11/10/2020  Family History  Diabetes mellitus type 2: Mother.  Hypertension.: Mother.  Malignant neoplasm of cervix: Mother.  Health Maintenance  Health Maintenance  ???Pending?(in the next year)  ??? ??OverDue  ??? ? ? ?Alcohol Misuse Screening due??01/02/20??and every 1??year(s)  ??? ? ? ?Cognitive Screening due??01/02/20??and every 1??year(s)  ??? ? ? ?Diabetes Maintenance-HgbA1c due??06/20/20??and every 1??year(s)  ??? ??Due?  ??? ? ? ?Influenza Vaccine due??10/01/20??and every 1??day(s)  ??? ? ? ?Bone Density Screening due??11/10/20??Variable frequency  ??? ? ? ?Breast Cancer Screening due??11/10/20??Unknown Frequency  ??? ? ? ?Colorectal Screening due??11/10/20??Unknown Frequency  ??? ? ? ?Diabetes Maintenance-Fasting Lipid Profile due??11/10/20??Variable frequency  ??? ? ? ?Diabetes Maintenance-Eye Exam due??11/10/20??Unknown Frequency  ??? ? ? ?Diabetes Maintenance-Foot Exam due??11/10/20??Unknown Frequency  ??? ? ? ?Medicare Annual Wellness Exam due??11/10/20??and every 1??year(s)  ??? ? ? ?Pneumococcal  Vaccine due??11/10/20??Unknown Frequency  ??? ? ? ?Tetanus Vaccine due??11/10/20??and every 10??year(s)  ??? ? ? ?Zoster Vaccine due??11/10/20??Unknown Frequency  ??? ??Due In Future?  ??? ? ? ?Obesity Screening not due until??01/01/21??and every 1??year(s)  ??? ? ? ?Advance Directive not due until??01/02/21??and every 1??year(s)  ??? ? ? ?Fall Risk Assessment not due until??01/02/21??and every 1??year(s)  ??? ? ? ?Functional Assessment not due until??01/02/21??and every 1??year(s)  ??? ? ? ?Aspirin Therapy for CVD Prevention not due until??04/14/21??and every 1??year(s)  ??? ? ? ?ADL Screening not due until??04/15/21??and every 1??year(s)  ??? ? ? ?Depression Screening not due until??06/04/21??and every 1??year(s)  ??? ? ? ?Hypertension Management-BMP not due until??07/28/21??and every 1??year(s)  ??? ? ? ?Diabetes Maintenance-Serum Creatinine not due until??07/28/21??and every 1??year(s)  ??? ? ? ?Body Mass Index Check not due until??08/11/21??and every 1??year(s)  ??? ? ? ?Blood Pressure Screening not due until??09/03/21??and every 1??year(s)  ??? ? ? ?Hypertension Management-Blood Pressure not due until??09/03/21??and every 1??year(s)  ???Satisfied?(in the past 1 year)  ??? ??Satisfied?  ??? ? ? ?ADL Screening on??04/15/20.??Satisfied by Helga Callejas RN  ??? ? ? ?Advance Directive on??08/11/20.??Satisfied by Lyly Batres RN  ??? ? ? ?Aspirin Therapy for CVD Prevention on??04/14/20.??Satisfied by Lester GUEVARA, Cecily Sorto  ??? ? ? ?Blood Pressure Screening on??11/10/20.??Satisfied by Carlos GUEVARA, Lottie Rivera  ??? ? ? ?Body Mass Index Check on??08/11/20.??Satisfied by Lyly Batres RN  ??? ? ? ?Depression Screening on??06/04/20.??Satisfied by Christopher Prakash LPN  ??? ? ? ?Diabetes Maintenance-Serum Creatinine on??07/28/20.??Satisfied by Jossie Calderón  ??? ? ? ?Diabetes Screening on??07/28/20.??Satisfied by Jossie Calderón  ??? ? ? ?Fall Risk Assessment  on??07/28/20.??Satisfied by Rachel Mathew  ??? ? ? ?Functional Assessment on??11/10/20.??Satisfied by Lottie Gonzalez RN  ??? ? ? ?Hypertension Management-Blood Pressure on??11/10/20.??Satisfied by Lottie Gonzalez RN  ??? ? ? ?Influenza Vaccine on??11/10/20.??Satisfied by Lottie Gonzalez RN  ??? ? ? ?Obesity Screening on??08/11/20.??Satisfied by Medardo GUEVARA, Lyly HAM  ?

## 2022-05-03 NOTE — HISTORICAL [facility]
This is a historical note converted from Lisa. Formatting and pictures may have been removed.  Please reference Lisa for original formatting and attached multimedia. Chief Complaint  R hip wound approximately 1 1/2 mths  Physical Exam  Vitals & Measurements  T:?36.8? ?C (Oral)? HR:?72(Peripheral)? RR:?18? BP:?118/72? SpO2:?100%?  HT:?155?cm? WT:?49.4?kg?  Assessment/Plan  1.?Diabetes mellitus type 2, controlled, without complications?E11.9  ?Incision/Wounds  ?1. Hip Right Pressure ulcer?- last charted: 09/03/2020 14:17  ?? ??Assessment Done By?- Wound Care Team  ?? ??Pressure Point?- Bony prominence  ?? ??Dressing Type?- Band-Aid  ?? ??Dressing Assessment?- Clean, Dry, Intact  ?? ??Dressing Activity?- Applied  ?? ??Cleansing?- Cleaned with normal saline  ?? ??Length?- 0 cm  ?? ??Width?- 0 cm  ?? ??Depth?- 0 cm  ?? ??Wound Bed Tissue Type?- Epithelialized, Erythema  ?? ??Pressure Ulcer Stage?- III  ?? ??Exudate Amount?- None  ?? ??Exudate Type?- Serous  ?? ??Exudate Odor?- None  ?? ??Edge?- Well defined  ?? ??Surrounding Tissue Color?- Normal  ?? ??Surrounding Tissue Condition?- Intact  ?? ??Status?- Healed  ?  ?2. Leg Right Circumferential, Lower Rash?- last charted: 09/03/2020 14:17  ?? ??Assessment Done By?- Wound Care Team  ?? ??Abnormality Pattern?- Flat  ?? ??Abnormality Color?- Purple, Red  ?? ??Rash Distribution?- Dense, Localized  ?? ??Rash Appearance?- Petechiae  ?? ??Dressing Type?- None  ?? ??Wound Bed Tissue Type?- Epithelialized, Erythema  ?? ??Exudate Amount?- None  ?? ??Exudate Odor?- None  ?? ??Edge?- Well defined  ?? ??Surrounding Tissue Condition?- Edematous  Alexanderandrei for follow-up her hip wound is completely healed?she does have some petechiae on the right lower extremity and?gaiter region this is most likely related to her ongoing chemotherapy for advanced pancreatic cancer?or could be secondary to repeated potassium infusions that she is receiving per family?and either way there is no evidence  of any kind of DVT calf is soft and supple nontender there is no?cellulitis no evidence of any infection?this is most likely drug-related this was explained to the patient she will be discharged from the clinic for follow-up with her oncology group  2.?Decubitus ulcer of right hip, unstageable?L89.210  3.?Eschar of wound bed?T14.8XXA  4.?Malignant neoplasm of head of pancreas?C25.0  5.?Pressure ulcer of right buttock, stage 2?L89.312  6.?Primary pancreatic cancer?C25.9  Orders:  Wound Care Team Treatment, 09/03/20 14:24:00 CDT, Stop date 09/03/20 14:24:00 CDT, dc pt  Referrals  Discharge Follow Up, Specialty: General Surgery, Reason: follow up bucky, Refer To: Olinda JEFFERSON, Shiva WING, [facility]. Medical Specialist: [address], [address], [place], [id number]., Start: 07/18/20 14:14:00 CDT  Infusion Visit, 06/11/20 12:00:00 CDT, Order for future visit  Lab Draw, 06/04/20 10:30:00 CDT  Lab Draw, 06/10/20 15:30:00 CDT  Lab Draw, 06/04/20 8:00:00 CDT, Day 8, Pancreatic cancer  Agranulocytosis secondary to cancer chemotherapy, [facility]  Nutrition Request (in clinic), 06/04/20 10:45:00 CDT, [facility]  Referral to Gastroenterology, Elevated LFTs and bilirubin, Referred to Provider Dr. Youssef, Patient known to him., Malignant neoplasm of head of pancreas  Decubitus ulcer of right hip, unstageable  Agranulocytosis secondary to cancer chemotherapy  Anemia  Treatment/Doctor Visit, 06/04/20 11:00:00 CDT  Treatment/Doctor Visit, 07/28/20 10:30:00 CDT, Malignant neoplasm of head of pancreas  Decubitus ulcer of right hip, unstageable, [facility]  Treatment/Doctor Visit, 05/21/20 14:30:00 CDT, Pancreatic cancer  Agranulocytosis secondary to cancer chemotherapy, [facility]   Problem List/Past Medical History  Ongoing  Decubitus ulcer of right hip, unstageable  Diabetes mellitus  Diabetes mellitus type 2, controlled, without complications  Eschar of wound bed  High blood  pressure  Hypercholesterolemia  Hypothyroidism(  Confirmed  )  Liver masses  Malignant cachexia  Malignant neoplasm of head of pancreas  Pressure ulcer of hip  Vitiligo  Historical  No qualifying data  Procedure/Surgical History  Bypass Common Bile Duct to Small Intestine, Open Approach (07/13/2020)  Bypass Stomach to Jejunum, Open Approach (07/13/2020)  Excision of Liver, Open Approach, Diagnostic (07/13/2020)  Excision of Right Lobe Liver, Open Approach, Diagnostic (07/13/2020)  Resection of Gallbladder, Open Approach (07/13/2020)  Whipple Procedure (.) (07/13/2020)  Dilation of Common Bile Duct with Intraluminal Device, Via Natural or Artificial Opening Endoscopic (04/15/2020)  Endoscopic Retrograde Cholangiopancreatography (04/15/2020)  Extirpation of Matter from Common Bile Duct, Via Natural or Artificial Opening Endoscopic (04/15/2020)  Pancreatic Stent Placement (04/15/2020)  Catheter Insertion Mediport (.) (03/12/2020)  Fluoroscopy of Superior Vena Cava using Low Osmolar Contrast, Guidance (03/12/2020)  Insertion of Infusion Device into Superior Vena Cava, Percutaneous Approach (03/12/2020)  Insertion of Totally Implantable Vascular Access Device into Chest Subcutaneous Tissue and Fascia, Open Approach (03/12/2020)  Insertion of tunneled centrally inserted central venous access device, with subcutaneous port; age 5 years or older (03/12/2020)  Biliary Stent (Upper) (02/20/2020)  Dilation of Common Bile Duct with Intraluminal Device, Via Natural or Artificial Opening Endoscopic (02/20/2020)  EGD w/ Fine Needle Biopsy / Aspiration (Upper) (02/20/2020)  Endoscopic retrograde cholangiopancreatography (ERCP); with placement of endoscopic stent into biliary or pancreatic duct, including pre- and post-dilation and guide wire passage, when performed, including sphincterotomy, when performed, each stent (02/20/2020)  Endoscopic Retrograde Cholangiopancreatography (Upper) (02/20/2020)  Endoscopic Ultrasonography  (Upper) (02/20/2020)  Biopsy Gastrointestinal (02/13/2020)  Endoscopic Retrograde Cholangiopancreatography (02/13/2020)  Esophagogastroduodenoscopy (02/13/2020)  Esophagogastroduodenoscopy, flexible, transoral; with biopsy, single or multiple (02/13/2020)  Excision of Duodenum, Via Natural or Artificial Opening Endoscopic, Diagnostic (02/13/2020)  bladder sx (2015)  Thyroidectomy (1970)  left arm surgery to remove tumor   Medications  acetaminophen-hydrocodone 325 mg-10 mg oral tablet, Oral, q4-6hr  amlodipine 5 mg oral tablet, 5 mg= 1 tab(s), Oral, BID  amlodipine 5 mg oral tablet, 5 mg= 1 tab(s), Oral, BID  Creon 6000 units oral delayed release capsule, 1 cap(s), Oral, QID  glipiZIDE 10 mg oral tablet, extended release (XL tab), 10 mg= 1 tab(s), Oral, BID  levothyroxine 150 mcg (0.15 mg) oral capsule, 150 mcg= 1 cap(s), Oral, Daily  levothyroxine 150 mcg (0.15 mg) oral tablet, 150 mcg= 1 tab(s), Oral, Daily  megestrol 40 mg/mL oral suspension, 800 mg= 20 mL, Oral, Daily, 6 refills  promethazine 25 mg oral tablet, 25 mg= 1 tab(s), Oral, q6hr  Zofran 8 mg oral tablet, 8 mg= 1 tab(s), Oral, q8hr, PRN  Allergies  No Known Allergies  No Known Medication Allergies  Social History  Abuse/Neglect  No, 08/11/2020  Alcohol  Never, 03/05/2020  Employment/School  Unemployed, 03/05/2020  Exercise  Nutrition/Health  Regular, 03/05/2020  Spiritual/Cultural  Taoism, 07/06/2020  Substance Use  Never, 03/05/2020  Tobacco  Never (less than 100 in lifetime), N/A, 08/11/2020  Family History  Diabetes mellitus type 2: Mother.  Hypertension.: Mother.  Malignant neoplasm of cervix: Mother.  Health Maintenance  Health Maintenance  ???Pending?(in the next year)  ??? ??OverDue  ??? ? ? ?Diabetes Maintenance-Microalbumin due??and every?  ??? ? ? ?Alcohol Misuse Screening due??01/02/20??and every 1??year(s)  ??? ? ? ?Cognitive Screening due??01/02/20??and every 1??year(s)  ??? ? ? ?Diabetes Maintenance-HgbA1c due??06/20/20??and every  1??year(s)  ??? ??Due?  ??? ? ? ?Bone Density Screening due??09/03/20??Variable frequency  ??? ? ? ?Breast Cancer Screening due??09/03/20??and every?  ??? ? ? ?Colorectal Screening due??09/03/20??and every?  ??? ? ? ?Diabetes Maintenance-Fasting Lipid Profile due??09/03/20??Variable frequency  ??? ? ? ?Diabetes Maintenance-Eye Exam due??09/03/20??and every?  ??? ? ? ?Diabetes Maintenance-Foot Exam due??09/03/20??and every?  ??? ? ? ?Influenza Vaccine due??09/03/20??and every?  ??? ? ? ?Medicare Annual Wellness Exam due??09/03/20??and every 1??year(s)  ??? ? ? ?Pneumococcal Vaccine due??09/03/20??and every?  ??? ? ? ?Tetanus Vaccine due??09/03/20??and every 10??year(s)  ??? ? ? ?Zoster Vaccine due??09/03/20??and every?  ??? ??Due In Future?  ??? ? ? ?Obesity Screening not due until??01/01/21??and every 1??year(s)  ??? ? ? ?Advance Directive not due until??01/02/21??and every 1??year(s)  ??? ? ? ?Fall Risk Assessment not due until??01/02/21??and every 1??year(s)  ??? ? ? ?Functional Assessment not due until??01/02/21??and every 1??year(s)  ??? ? ? ?Aspirin Therapy for CVD Prevention not due until??04/14/21??and every 1??year(s)  ??? ? ? ?ADL Screening not due until??04/15/21??and every 1??year(s)  ??? ? ? ?Depression Screening not due until??06/04/21??and every 1??year(s)  ??? ? ? ?Hypertension Management-BMP not due until??07/28/21??and every 1??year(s)  ??? ? ? ?Diabetes Maintenance-Serum Creatinine not due until??07/28/21??and every 1??year(s)  ??? ? ? ?Body Mass Index Check not due until??08/11/21??and every 1??year(s)  ???Satisfied?(in the past 1 year)  ??? ??Satisfied?  ??? ? ? ?ADL Screening on??04/15/20.??Satisfied by Júnior GUEVARA, Helga Guo  ??? ? ? ?Advance Directive on??08/11/20.??Satisfied by Lyly Batres RN  ??? ? ? ?Aspirin Therapy for CVD Prevention on??04/14/20.??Satisfied by Cecliy Batista RN  ??? ? ? ?Blood Pressure Screening on??09/03/20.??Satisfied by [place] GUEVARA, Lottie Rivera  ??? ? ?  ?Body Mass Index Check on??08/11/20.??Satisfied by Lyly Batres RN  ??? ? ? ?Depression Screening on??06/04/20.??Satisfied by Christopher Prakash LPN  ??? ? ? ?Diabetes Maintenance-Serum Creatinine on??07/28/20.??Satisfied by Jossie Calderón  ??? ? ? ?Diabetes Screening on??07/28/20.??Satisfied by Jossie Calderón  ??? ? ? ?Fall Risk Assessment on??07/28/20.??Satisfied by Rachel Mathew  ??? ? ? ?Functional Assessment on??08/11/20.??Satisfied by Lyly Batres RN.  ??? ? ? ?Hypertension Management-Blood Pressure on??09/03/20.??Satisfied by Lottie Gonzalez RN  ??? ? ? ?Obesity Screening on??08/11/20.??Satisfied by Lyly Batres RN  ?

## 2022-05-03 NOTE — HISTORICAL [facility]
This is a historical note converted from Lisa. Formatting and pictures may have been removed.  Please reference Lisa for original formatting and attached multimedia. Chief Complaint  buttocks / sacral ulcer  History of Present Illness  The patient returns for continued management of?a sacral?stage II decubitus ulcer.? She also continues to have pain?over her?left lower?rib cage?and?also reports?discoloration?in the same area following her fall?2 weeks ago.? She is currently using Norco 10 mg every 6 hours as needed pain.  Physical Exam  Vitals & Measurements  T:?36.4? ?C (Oral)? HR:?80(Peripheral)? RR:?20? BP:?92/64? SpO2:?99%?  HT:?154?cm? WT:?39.5?kg?  On examination the patient has a broad area of ecchymosis extensively involving her?left flank?and?lower rib?cage on the left.? She has slight tenderness.? She has a persistent?stage II?decubitus ulcer over the sacrum?without evidence of infection. ?There is a moderate amount of slough present. ?The periwound area is?characterized by hyperpigmentation changes.  ?  Incision/Wounds  ?1. Coccyx Other: sacrum/coccyx Pressure ulcer?- last charted: 11/24/2020 13:04  ?? ??Assessment Done By?- Wound Care Team  ?? ??Pressure Point?- Bony prominence  ?? ??Dressing Type?- Absorptive  ?? ??Dressing Assessment?- Drainage present, Intact  ?? ??Dressing Activity?- Changed  ?? ??Cleansing?- Cleaned with normal saline  ?? ??Length?- 0.8 cm  ?? ??Width?- 0.5 cm  ?? ??Depth?- 0.1 cm  ?? ??Wound Bed Tissue Type?- Granulating, Pale Pink  ?? ??Exudate Amount?- Scant  ?? ??Exudate Type?- Serous  ?? ??Exudate Odor?- None  ?? ??Edge?- Well defined  ?? ??Surrounding Tissue Color?- Erythema  ?? ??Surrounding Tissue Condition?- Intact  ?? ??Status?- Improving  ?  Assessment/Plan  1.?Decubital ulcer?L89.90  ?Wound care as recommended?and documented below.  Ordered:  Wound Care Outpatient, *Est. 12/08/20 3:00:00 CST, *Est. Stop date 12/08/20 3:00:00 CST, [facility], Return to Clinic  2 weeks  Wound Care Team Treatment, 11/24/20 13:20:00 CST, Stop date 11/24/20 13:20:00 CST, Apply collagen matrix dressing with foam overlay to sacral wound and change every other day. Use Norco as directed for pain.  ?  2.?Malignant neoplasm of head of pancreas?C25.0  ?Follow-up with oncology.  Ordered:  Wound Care Outpatient, *Est. 12/08/20 3:00:00 CST, *Est. Stop date 12/08/20 3:00:00 CST, [facility], Return to Clinic 2 weeks  Wound Care Team Treatment, 11/24/20 13:20:00 CST, Stop date 11/24/20 13:20:00 CST, Apply collagen matrix dressing with foam overlay to sacral wound and change every other day. Use Norco as directed for pain.  ?  3.?Rib contusion?S20.219A  ?Follow-up with PMD. ?Use Norco?as directed?as needed for pain.  Ordered:  Wound Care Outpatient, *Est. 12/08/20 3:00:00 CST, *Est. Stop date 12/08/20 3:00:00 CST, [facility], Return to Clinic 2 weeks  Wound Care Team Treatment, 11/24/20 13:20:00 CST, Stop date 11/24/20 13:20:00 CST, Apply collagen matrix dressing with foam overlay to sacral wound and change every other day. Use Norco as directed for pain.  ?  4.?Fall?W19.XXXA  ?No new falls?reported.  Ordered:  Wound Care Outpatient, *Est. 12/08/20 3:00:00 CST, *Est. Stop date 12/08/20 3:00:00 CST, [facility], Return to Clinic 2 weeks  Wound Care Team Treatment, 11/24/20 13:20:00 CST, Stop date 11/24/20 13:20:00 CST, Apply collagen matrix dressing with foam overlay to sacral wound and change every other day. Use Norco as directed for pain.  ?  Orders:  acetaminophen-HYDROcodone, 1 tab(s), Oral, q6hr, PRN PRN as needed for pain, X 7 day(s), # 12 tab(s), 0 Refill(s), Patient Verbalizes Understanding   Problem List/Past Medical History  Ongoing  Decubital ulcer  Decubitus ulcer of right hip, unstageable  Diabetes mellitus  Diabetes mellitus type 2, controlled, without complications  Eschar of wound bed  Fall  High blood pressure  Hypercholesterolemia  Hypothyroidism(  Confirmed   )  Liver masses  Malignant cachexia  Malignant neoplasm of head of pancreas  Pressure ulcer of hip  Rib contusion  Vitiligo  Historical  No qualifying data  Procedure/Surgical History  Bypass Common Bile Duct to Small Intestine, Open Approach (07/13/2020)  Bypass Stomach to Jejunum, Open Approach (07/13/2020)  Excision of Liver, Open Approach, Diagnostic (07/13/2020)  Excision of Right Lobe Liver, Open Approach, Diagnostic (07/13/2020)  Resection of Gallbladder, Open Approach (07/13/2020)  Whipple Procedure (.) (07/13/2020)  Dilation of Common Bile Duct with Intraluminal Device, Via Natural or Artificial Opening Endoscopic (04/15/2020)  Endoscopic Retrograde Cholangiopancreatography (04/15/2020)  Extirpation of Matter from Common Bile Duct, Via Natural or Artificial Opening Endoscopic (04/15/2020)  Pancreatic Stent Placement (04/15/2020)  Catheter Insertion Mediport (.) (03/12/2020)  Fluoroscopy of Superior Vena Cava using Low Osmolar Contrast, Guidance (03/12/2020)  Insertion of Infusion Device into Superior Vena Cava, Percutaneous Approach (03/12/2020)  Insertion of Totally Implantable Vascular Access Device into Chest Subcutaneous Tissue and Fascia, Open Approach (03/12/2020)  Insertion of tunneled centrally inserted central venous access device, with subcutaneous port; age 5 years or older (03/12/2020)  Biliary Stent (Upper) (02/20/2020)  Dilation of Common Bile Duct with Intraluminal Device, Via Natural or Artificial Opening Endoscopic (02/20/2020)  EGD w/ Fine Needle Biopsy / Aspiration (Upper) (02/20/2020)  Endoscopic retrograde cholangiopancreatography (ERCP); with placement of endoscopic stent into biliary or pancreatic duct, including pre- and post-dilation and guide wire passage, when performed, including sphincterotomy, when performed, each stent (02/20/2020)  Endoscopic Retrograde Cholangiopancreatography (Upper) (02/20/2020)  Endoscopic Ultrasonography (Upper) (02/20/2020)  Biopsy Gastrointestinal  (02/13/2020)  Endoscopic Retrograde Cholangiopancreatography (02/13/2020)  Esophagogastroduodenoscopy (02/13/2020)  Esophagogastroduodenoscopy, flexible, transoral; with biopsy, single or multiple (02/13/2020)  Excision of Duodenum, Via Natural or Artificial Opening Endoscopic, Diagnostic (02/13/2020)  bladder sx (2015)  Thyroidectomy (1970)  left arm surgery to remove tumor   Medications  acetaminophen-hydrocodone 325 mg-10 mg oral tablet, Oral, q4-6hr  amlodipine 5 mg oral tablet, 5 mg= 1 tab(s), Oral, BID  amlodipine 5 mg oral tablet, 5 mg= 1 tab(s), Oral, BID  Creon 6000 units oral delayed release capsule, 1 cap(s), Oral, QID  glipiZIDE 10 mg oral tablet, extended release (XL tab), 10 mg= 1 tab(s), Oral, BID  levothyroxine 150 mcg (0.15 mg) oral capsule, 150 mcg= 1 cap(s), Oral, Daily  levothyroxine 150 mcg (0.15 mg) oral tablet, 150 mcg= 1 tab(s), Oral, Daily  megestrol 40 mg/mL oral suspension, 800 mg= 20 mL, Oral, Daily, 6 refills  Norco 10 mg-325 mg oral tablet, 1 tab(s), Oral, q6hr, PRN  promethazine 25 mg oral tablet, 25 mg= 1 tab(s), Oral, q6hr  Zofran 8 mg oral tablet, 8 mg= 1 tab(s), Oral, q8hr, PRN  Allergies  No Known Allergies  No Known Medication Allergies  Social History  Abuse/Neglect  No, 11/10/2020  Alcohol  Never, 03/05/2020  Employment/School  Unemployed, 03/05/2020  Exercise  Nutrition/Health  Regular, 03/05/2020  Spiritual/Cultural  [employer], 07/06/2020  Substance Use  Never, 03/05/2020  Tobacco  Never (less than 100 in lifetime), N/A, 11/10/2020  Family History  Diabetes mellitus type 2: Mother.  Hypertension.: Mother.  Malignant neoplasm of cervix: Mother.  Health Maintenance  Health Maintenance  ???Pending?(in the next year)  ??? ??OverDue  ??? ? ? ?Alcohol Misuse Screening due??01/02/20??and every 1??year(s)  ??? ? ? ?Cognitive Screening due??01/02/20??and every 1??year(s)  ??? ? ? ?Diabetes Maintenance-HgbA1c due??06/20/20??and every 1??year(s)  ??? ??Due?  ??? ? ? ?Influenza Vaccine  due??10/01/20??and every 1??day(s)  ??? ? ? ?Bone Density Screening due??11/24/20??Variable frequency  ??? ? ? ?Breast Cancer Screening due??11/24/20??Unknown Frequency  ??? ? ? ?Colorectal Screening due??11/24/20??Unknown Frequency  ??? ? ? ?Diabetes Maintenance-Fasting Lipid Profile due??11/24/20??Variable frequency  ??? ? ? ?Diabetes Maintenance-Eye Exam due??11/24/20??Unknown Frequency  ??? ? ? ?Diabetes Maintenance-Foot Exam due??11/24/20??Unknown Frequency  ??? ? ? ?Medicare Annual Wellness Exam due??11/24/20??and every 1??year(s)  ??? ? ? ?Pneumococcal Vaccine due??11/24/20??Unknown Frequency  ??? ? ? ?Tetanus Vaccine due??11/24/20??and every 10??year(s)  ??? ? ? ?Zoster Vaccine due??11/24/20??Unknown Frequency  ??? ??Due In Future?  ??? ? ? ?Obesity Screening not due until??01/01/21??and every 1??year(s)  ??? ? ? ?Advance Directive not due until??01/02/21??and every 1??year(s)  ??? ? ? ?Fall Risk Assessment not due until??01/02/21??and every 1??year(s)  ??? ? ? ?Functional Assessment not due until??01/02/21??and every 1??year(s)  ??? ? ? ?Aspirin Therapy for CVD Prevention not due until??04/14/21??and every 1??year(s)  ??? ? ? ?ADL Screening not due until??04/15/21??and every 1??year(s)  ??? ? ? ?Depression Screening not due until??06/04/21??and every 1??year(s)  ??? ? ? ?Hypertension Management-BMP not due until??07/28/21??and every 1??year(s)  ??? ? ? ?Diabetes Maintenance-Serum Creatinine not due until??07/28/21??and every 1??year(s)  ??? ? ? ?Body Mass Index Check not due until??08/11/21??and every 1??year(s)  ??? ? ? ?Blood Pressure Screening not due until??11/10/21??and every 1??year(s)  ??? ? ? ?Hypertension Management-Blood Pressure not due until??11/10/21??and every 1??year(s)  ???Satisfied?(in the past 1 year)  ??? ??Satisfied?  ??? ? ? ?ADL Screening on??04/15/20.??Satisfied by Helga Callejas RN  ??? ? ? ?Advance Directive on??08/11/20.??Satisfied by Medardo GUEVARA, Lyly HAM  ??? ? ? ?Aspirin  Therapy for CVD Prevention on??04/14/20.??Satisfied by Lester GUEVARA, Cecily Sorto  ??? ? ? ?Blood Pressure Screening on??11/24/20.??Satisfied by Lottie Gonzalez RN  ??? ? ? ?Body Mass Index Check on??08/11/20.??Satisfied by Lyly Batres RN  ??? ? ? ?Depression Screening on??06/04/20.??Satisfied by Christopher Prakash LPN  ??? ? ? ?Diabetes Maintenance-Serum Creatinine on??07/28/20.??Satisfied by Jossie Calderón  ??? ? ? ?Diabetes Screening on??07/28/20.??Satisfied by Jossie Calderón  ??? ? ? ?Fall Risk Assessment on??07/28/20.??Satisfied by Rachel Mathew  ??? ? ? ?Functional Assessment on??11/10/20.??Satisfied by Lottie Gonzalez RN  ??? ? ? ?Hypertension Management-Blood Pressure on??11/24/20.??Satisfied by Lottie Gonzalez RN  ??? ? ? ?Influenza Vaccine on??11/10/20.??Satisfied by Lottie Gonzalez RN  ??? ? ? ?Obesity Screening on??08/11/20.??Satisfied by Lyly Batres RN  ?

## 2022-05-03 NOTE — HISTORICAL [facility]
This is a historical note converted from Lisa. Formatting and pictures may have been removed.  Please reference Lisa for original formatting and attached multimedia. Chief Complaint  R hip wound approximately 1 1/2 mths  Physical Exam  Vitals & Measurements  T:?36.9? ?C (Oral)? HR:?76(Peripheral)? RR:?18? BP:?164/75? SpO2:?100%?  HT:?155?cm? WT:?49.4?kg?  Assessment/Plan  1.?Diabetes mellitus type 2, controlled, without complications?E11.9  ?Incision/Wounds  ?1. Hip Right Pressure ulcer?- last charted: 07/07/2020 13:10  ?? ??Assessment Done By?- Wound Care Team  ?? ??Pressure Point?- Bony prominence  ?? ??Dressing Type?- Collagen, Foam  ?? ??Dressing Assessment?- Drainage present, Intact  ?? ??Dressing Activity?- Changed  ?? ??Cleansing?- Cleaned with normal saline  ?? ??Length?- 0.8 cm  ?? ??Width?- 2.5 cm  ?? ??Depth?- 0.1 cm  ?? ??Wound Bed Tissue Type?- Hypergranular  ?? ??Percent Granulated?- 100 %  ?? ??Pressure Ulcer Stage?- III  ?? ??Exudate Amount?- Small  ?? ??Exudate Type?- Serous  ?? ??Exudate Odor?- None  ?? ??Edge?- Well defined  ?? ??Surrounding Tissue Color?- Erythema  ?? ??Surrounding Tissue Condition?- Intact  ?? ??Status?- Improving  ?  ?2. Buttock Right Pressure ulcer?- last charted: 07/07/2020 13:10  ?? ??Assessment Done By?- Wound Care Team  ?? ??Dressing Type?- None  ?? ??Cleansing?- Cleaned with normal saline  ?? ??Length?- 0 cm  ?? ??Width?- 0 cm  ?? ??Depth?- 0 cm  ?? ??Wound Bed Tissue Type?- Epithelialized  ?? ??Pressure Ulcer Stage?- II  ?? ??Exudate Amount?- None  ?? ??Exudate Odor?- None  ?? ??Edge?- Poorly defined  ?? ??Surrounding Tissue Color?- Erythema  ?? ??Surrounding Tissue Condition?- Intact  ?? ??Status?- Healed  Presents for follow-up today she is responding very well to her current collagen matrix dressing protocol we will continue same follow-up in 2 weeks?assessment was performed she is doing very well with current treatment protocol continue same  2.?Decubitus ulcer  of right hip, unstageable?L89.210  3.?Eschar of wound bed?T14.8XXA  4.?Malignant neoplasm of head of pancreas?C25.0  5.?Pressure ulcer of right buttock, stage 2?L89.312  Orders:  Wound Care Outpatient, *Est. 07/21/20 13:00:00 CDT, *Est. Stop date 07/21/20 13:00:00 CDT, [facility], FU with Physician in 2 week  Wound Care Team Treatment, 07/07/20 13:17:00 CDT, Stop date 07/07/20 13:17:00 CDT, R hip - continue collagen matrix dressing protocol with foam to reduce hypergranular tissue, cover with cloth tape. Change every other day  Referrals  Infusion Visit, 06/11/20 12:00:00 CDT, Order for future visit  Lab Draw, 06/04/20 10:30:00 CDT  Lab Draw, 06/10/20 15:30:00 CDT  Lab Draw, 06/04/20 8:00:00 CDT, Day 8, Pancreatic cancer  Agranulocytosis secondary to cancer chemotherapy, [facility]  Nutrition Request (in clinic), 06/04/20 10:45:00 CDT, [facility]  Referral to Gastroenterology, Elevated LFTs and bilirubin, Referred to Provider Dr. Youssef, Patient known to him., Malignant neoplasm of head of pancreas  Decubitus ulcer of right hip, unstageable  Agranulocytosis secondary to cancer chemotherapy  Anemia  Treatment/Doctor Visit, 06/04/20 11:00:00 CDT  Treatment/Doctor Visit, 05/21/20 14:30:00 CDT, Pancreatic cancer  Agranulocytosis secondary to cancer chemotherapy, [facility]   Problem List/Past Medical History  Ongoing  Decubitus ulcer of right hip, unstageable  Diabetes mellitus  Diabetes mellitus type 2, controlled, without complications  Eschar of wound bed  High blood pressure  Hypercholesterolemia  Hypothyroidism(  Confirmed  )  Malignant cachexia  Malignant neoplasm of head of pancreas  Pressure ulcer of hip  Vitiligo  Historical  No qualifying data  Procedure/Surgical History  Dilation of Common Bile Duct with Intraluminal Device, Via Natural or Artificial Opening Endoscopic (04/15/2020)  Endoscopic Retrograde Cholangiopancreatography (04/15/2020)  Extirpation of Matter from  Common Bile Duct, Via Natural or Artificial Opening Endoscopic (04/15/2020)  Pancreatic Stent Placement (04/15/2020)  Catheter Insertion Mediport (.) (03/12/2020)  Fluoroscopy of Superior Vena Cava using Low Osmolar Contrast, Guidance (03/12/2020)  Insertion of Infusion Device into Superior Vena Cava, Percutaneous Approach (03/12/2020)  Insertion of Totally Implantable Vascular Access Device into Chest Subcutaneous Tissue and Fascia, Open Approach (03/12/2020)  Insertion of tunneled centrally inserted central venous access device, with subcutaneous port; age 5 years or older (03/12/2020)  Biliary Stent (Upper) (02/20/2020)  Dilation of Common Bile Duct with Intraluminal Device, Via Natural or Artificial Opening Endoscopic (02/20/2020)  EGD w/ Fine Needle Biopsy / Aspiration (Upper) (02/20/2020)  Endoscopic retrograde cholangiopancreatography (ERCP); with placement of endoscopic stent into biliary or pancreatic duct, including pre- and post-dilation and guide wire passage, when performed, including sphincterotomy, when performed, each stent (02/20/2020)  Endoscopic Retrograde Cholangiopancreatography (Upper) (02/20/2020)  Endoscopic Ultrasonography (Upper) (02/20/2020)  Biopsy Gastrointestinal (02/13/2020)  Endoscopic Retrograde Cholangiopancreatography (02/13/2020)  Esophagogastroduodenoscopy (02/13/2020)  Esophagogastroduodenoscopy, flexible, transoral; with biopsy, single or multiple (02/13/2020)  Excision of Duodenum, Via Natural or Artificial Opening Endoscopic, Diagnostic (02/13/2020)  bladder sx (2015)  Thyroidectomy (1970)  left arm surgery to remove tumor   Medications  acetaminophen-hydrocodone 325 mg-10 mg oral tablet, Oral, q4-6hr  amlodipine 5 mg oral tablet, 5 mg= 1 tab(s), Oral, BID  glipiZIDE 10 mg oral tablet, extended release (XL tab), 10 mg= 1 tab(s), Oral, BID  levothyroxine 150 mcg (0.15 mg) oral capsule, 150 mcg= 1 cap(s), Oral, Daily  levothyroxine 150 mcg (0.15 mg) oral tablet, 150 mcg= 1  tab(s), Oral, Daily  megestrol 40 mg/mL oral suspension, 800 mg= 20 mL, Oral, Daily, 6 refills  promethazine 25 mg oral tablet, 25 mg= 1 tab(s), Oral, q6hr  simvastatin 20 mg oral tablet, 20 mg= 1 tab(s), Oral, qPM  Zofran 8 mg oral tablet, 8 mg= 1 tab(s), Oral, q8hr, PRN  Allergies  No Known Allergies  No Known Medication Allergies  Social History  Abuse/Neglect  No, No, 07/06/2020  No, 06/17/2020  No, No, Yes, 06/16/2020  No, 06/04/2020  No, 04/14/2020  No, 04/09/2020  No, 04/01/2020  No, 03/26/2020  Alcohol  Never, 03/05/2020  Employment/School  Unemployed, 03/05/2020  Exercise  Nutrition/Health  Regular, 03/05/2020  Spiritual/Cultural  T[employer]ism, 07/06/2020  Substance Use  Never, 03/05/2020  Tobacco  Never (less than 100 in lifetime), N/A, 07/06/2020  Never (less than 100 in lifetime), No, 06/16/2020  Never (less than 100 in lifetime), N/A, 06/04/2020  Never (less than 100 in lifetime), N/A, 05/12/2020  Never (less than 100 in lifetime), N/A, 04/09/2020  Never (less than 100 in lifetime), N/A, 04/01/2020  Family History  Diabetes mellitus type 2: Mother.  Hypertension.: Mother.  Malignant neoplasm of cervix: Mother.  Health Maintenance  Health Maintenance  ???Pending?(in the next year)  ??? ??OverDue  ??? ? ? ?Diabetes Maintenance-Microalbumin due??and every?  ??? ? ? ?Alcohol Misuse Screening due??01/01/20??and every 1??year(s)  ??? ? ? ?Diabetes Maintenance-HgbA1c due??06/20/20??and every 1??year(s)  ??? ??Due?  ??? ? ? ?Bone Density Screening due??07/07/20??Variable frequency  ??? ? ? ?Diabetes Maintenance-Eye Exam due??07/07/20??and every?  ??? ? ? ?Medicare Annual Wellness Exam due??07/07/20??and every 1??year(s)  ??? ? ? ?Pneumococcal Vaccine due??07/07/20??Variable frequency  ??? ? ? ?Tetanus Vaccine due??07/07/20??and every 10??year(s)  ??? ? ? ?Zoster Vaccine due??07/07/20??and every 100??year(s)  ??? ??Due In Future?  ??? ? ? ?Advance Directive not due until??01/01/21??and every 1??year(s)  ??? ? ?  ?Cognitive Screening not due until??01/01/21??and every 1??year(s)  ??? ? ? ?Fall Risk Assessment not due until??01/01/21??and every 1??year(s)  ??? ? ? ?Functional Assessment not due until??01/01/21??and every 1??year(s)  ??? ? ? ?Obesity Screening not due until??01/01/21??and every 1??year(s)  ??? ? ? ?Aspirin Therapy for CVD Prevention not due until??04/14/21??and every 1??year(s)  ??? ? ? ?ADL Screening not due until??04/15/21??and every 1??year(s)  ??? ? ? ?Diabetes Maintenance-Foot Exam not due until??06/20/21??and every 1??year(s)  ??? ? ? ?Hypertension Management-BMP not due until??06/20/21??and every 1??year(s)  ??? ? ? ?Diabetes Maintenance-Serum Creatinine not due until??06/20/21??and every 1??year(s)  ???Satisfied?(in the past 1 year)  ??? ??Satisfied?  ??? ? ? ?ADL Screening on??04/15/20.??Satisfied by Helga Callejas RN  ??? ? ? ?Advance Directive on??03/27/20.??Satisfied by Medardo GUEVARA, Lyly HAM  ??? ? ? ?Aspirin Therapy for CVD Prevention on??04/14/20.??Satisfied by Cecily Batista RN  ??? ? ? ?Blood Pressure Screening on??07/07/20.??Satisfied by [facility] GUEVARA, Lottie Rivera  ??? ? ? ?Body Mass Index Check on??06/17/20.??Satisfied by Flavia Liu RN  ??? ? ? ?Depression Screening on??06/04/20.??Satisfied by Christopher Prakash LPN  ??? ? ? ?Diabetes Maintenance-Serum Creatinine on??06/20/20.??Satisfied by Kiser MT, Yi S.  ??? ? ? ?Diabetes Screening on??06/20/20.??Satisfied by Yi Kiser MT  ??? ? ? ?Fall Risk Assessment on??06/04/20.??Satisfied by Christopher Prakash LPN  ??? ? ? ?Functional Assessment on??06/18/20.??Satisfied by Emily Mancilla  ??? ? ? ?Hypertension Management-Blood Pressure on??07/07/20.??Satisfied by [facility] GUEVARA, Lottie Rivera  ??? ? ? ?Obesity Screening on??06/17/20.??Satisfied by Alexa GUEVARA, Flavia MUHAMMAD  ?

## 2022-05-03 NOTE — HISTORICAL [facility]
This is a historical note converted from Lisa. Formatting and pictures may have been removed.  Please reference Lisa for original formatting and attached multimedia. Chief Complaint  buttocks / sacral ulcer  History of Present Illness  The patient?arrived?at the wound center?having been transported by her granddaughter.? On arrival?she was poorly responsive?and this physician was asked to?emergently evaluate her?in the waiting area.  ?  She has advanced?pancreatic CA.? Reportedly she has been bedbound?and?has had very poor?nutritional and?fluid intake.  Review of Systems  As noted in the history of present and past medical illnesses.  Physical Exam  Vitals & Measurements  T:?36.3? ?C (Oral)? HR:?102(Peripheral)? RR:?18? BP:?91/64? SpO2:?100%?  HT:?154?cm? WT:?39.5?kg?  On examination her blood pressure?was 50/41. ?Pulse-89.? She was?lethargic?and poorly responsive to questions and commands.? She was seated in a wheelchair. ?Her lung fields are clear.? Normal sinus rhythm without ectopic activity.? 1-2+ edema?generalized.  ?  ?  Assessment/Plan  1.?Altered mental status?R41.82  ?The patient is?experiencing?an acute metabolic encephalopathy.? There is evidence of severe?protein malnutrition?and?it is felt that intravascular volume deficit is currently playing a role?in her presentation.  ?  The patient will be transferred to the?ER. ?Her?condition has been discussed with the ER personnel.  2.?Hypotension?I95.9  ?As per?the ER physician.  3.?Malignant neoplasm of head of pancreas?C25.0  Noted.  4.?Decubital ulcer?L89.90   Problem List/Past Medical History  Ongoing  Altered mental status  Decubital ulcer  Decubitus ulcer of right hip, unstageable  Diabetes mellitus  Diabetes mellitus type 2, controlled, without complications  Eschar of wound bed  Fall  High blood pressure  Hypercholesterolemia  Hypotension  Hypothyroidism(  Confirmed  )  Liver masses  Malignant cachexia  Malignant neoplasm of head of  pancreas  Pressure ulcer of hip  Rib contusion  Vitiligo  Historical  No qualifying data  Procedure/Surgical History  Bypass Common Bile Duct to Small Intestine, Open Approach (07/13/2020)  Bypass Stomach to Jejunum, Open Approach (07/13/2020)  Excision of Liver, Open Approach, Diagnostic (07/13/2020)  Excision of Right Lobe Liver, Open Approach, Diagnostic (07/13/2020)  Resection of Gallbladder, Open Approach (07/13/2020)  Whipple Procedure (.) (07/13/2020)  Dilation of Common Bile Duct with Intraluminal Device, Via Natural or Artificial Opening Endoscopic (04/15/2020)  Endoscopic Retrograde Cholangiopancreatography (04/15/2020)  Extirpation of Matter from Common Bile Duct, Via Natural or Artificial Opening Endoscopic (04/15/2020)  Pancreatic Stent Placement (04/15/2020)  Catheter Insertion Mediport (.) (03/12/2020)  Fluoroscopy of Superior Vena Cava using Low Osmolar Contrast, Guidance (03/12/2020)  Insertion of Infusion Device into Superior Vena Cava, Percutaneous Approach (03/12/2020)  Insertion of Totally Implantable Vascular Access Device into Chest Subcutaneous Tissue and Fascia, Open Approach (03/12/2020)  Insertion of tunneled centrally inserted central venous access device, with subcutaneous port; age 5 years or older (03/12/2020)  Biliary Stent (Upper) (02/20/2020)  Dilation of Common Bile Duct with Intraluminal Device, Via Natural or Artificial Opening Endoscopic (02/20/2020)  EGD w/ Fine Needle Biopsy / Aspiration (Upper) (02/20/2020)  Endoscopic retrograde cholangiopancreatography (ERCP); with placement of endoscopic stent into biliary or pancreatic duct, including pre- and post-dilation and guide wire passage, when performed, including sphincterotomy, when performed, each stent (02/20/2020)  Endoscopic Retrograde Cholangiopancreatography (Upper) (02/20/2020)  Endoscopic Ultrasonography (Upper) (02/20/2020)  Biopsy Gastrointestinal (02/13/2020)  Endoscopic Retrograde Cholangiopancreatography  (02/13/2020)  Esophagogastroduodenoscopy (02/13/2020)  Esophagogastroduodenoscopy, flexible, transoral; with biopsy, single or multiple (02/13/2020)  Excision of Duodenum, Via Natural or Artificial Opening Endoscopic, Diagnostic (02/13/2020)  bladder sx (2015)  Thyroidectomy (1970)  left arm surgery to remove tumor   Medications  acetaminophen-hydrocodone 325 mg-10 mg oral tablet, Oral, q4-6hr  amlodipine 5 mg oral tablet, 5 mg= 1 tab(s), Oral, BID  amlodipine 5 mg oral tablet, 5 mg= 1 tab(s), Oral, BID  Creon 6000 units oral delayed release capsule, 1 cap(s), Oral, QID  glipiZIDE 10 mg oral tablet, extended release (XL tab), 10 mg= 1 tab(s), Oral, BID  levothyroxine 150 mcg (0.15 mg) oral capsule, 150 mcg= 1 cap(s), Oral, Daily  levothyroxine 150 mcg (0.15 mg) oral tablet, 150 mcg= 1 tab(s), Oral, Daily  megestrol 40 mg/mL oral suspension, 800 mg= 20 mL, Oral, Daily, 6 refills  [id number] 1,000 mL, 1000 mL, IV  promethazine 25 mg oral tablet, 25 mg= 1 tab(s), Oral, q6hr  Zofran 8 mg oral tablet, 8 mg= 1 tab(s), Oral, q8hr, PRN  Allergies  No Known Allergies  No Known Medication Allergies  Social History  Abuse/Neglect  No, 11/10/2020  Alcohol  Never, 03/05/2020  Employment/School  Unemployed, 03/05/2020  Exercise  Nutrition/Health  Regular, 03/05/2020  Spiritual/Cultural  [employer], 07/06/2020  Substance Use  Never, 03/05/2020  Tobacco  Never (less than 100 in lifetime), N/A, 11/10/2020  Family History  Diabetes mellitus type 2: Mother.  Hypertension.: Mother.  Malignant neoplasm of cervix: Mother.  Health Maintenance  Health Maintenance  ???Pending?(in the next year)  ??? ??OverDue  ??? ? ? ?Diabetes Maintenance-HgbA1c due??06/20/20??and every 1??year(s)  ??? ? ? ?Influenza Vaccine due??10/01/20??and every 1??day(s)  ??? ? ? ?Advance Directive due??01/02/21??and every 1??year(s)  ??? ? ? ?Cognitive Screening due??01/02/21??and every 1??year(s)  ??? ? ? ?Fall Risk Assessment due??01/02/21??and every 1??year(s)  ??? ? ?  ?Functional Assessment due??01/02/21??and every 1??year(s)  ??? ??Due?  ??? ? ? ?Obesity Screening due??01/01/21??and every 1??year(s)  ??? ? ? ?Alcohol Misuse Screening due??01/02/21??and every 1??year(s)  ??? ? ? ?Bone Density Screening due??01/12/21??Variable frequency  ??? ? ? ?Breast Cancer Screening due??01/12/21??Unknown Frequency  ??? ? ? ?Colorectal Screening due??01/12/21??Unknown Frequency  ??? ? ? ?Diabetes Maintenance-Fasting Lipid Profile due??01/12/21??Variable frequency  ??? ? ? ?Diabetes Maintenance-Eye Exam due??01/12/21??Unknown Frequency  ??? ? ? ?Diabetes Maintenance-Foot Exam due??01/12/21??Unknown Frequency  ??? ? ? ?Medicare Annual Wellness Exam due??01/12/21??and every 1??year(s)  ??? ? ? ?Pneumococcal Vaccine due??01/12/21??Unknown Frequency  ??? ? ? ?Tetanus Vaccine due??01/12/21??and every 10??year(s)  ??? ? ? ?Zoster Vaccine due??01/12/21??Unknown Frequency  ??? ??Due In Future?  ??? ? ? ?Aspirin Therapy for CVD Prevention not due until??04/14/21??and every 1??year(s)  ??? ? ? ?ADL Screening not due until??04/15/21??and every 1??year(s)  ??? ? ? ?Depression Screening not due until??06/04/21??and every 1??year(s)  ??? ? ? ?Hypertension Management-BMP not due until??07/28/21??and every 1??year(s)  ??? ? ? ?Diabetes Maintenance-Serum Creatinine not due until??07/28/21??and every 1??year(s)  ??? ? ? ?Body Mass Index Check not due until??08/11/21??and every 1??year(s)  ??? ? ? ?Blood Pressure Screening not due until??12/08/21??and every 1??year(s)  ??? ? ? ?Hypertension Management-Blood Pressure not due until??12/08/21??and every 1??year(s)  ???Satisfied?(in the past 1 year)  ??? ??Satisfied?  ??? ? ? ?ADL Screening on??04/15/20.??Satisfied by Júnior GUEVARA, Helga Guo  ??? ? ? ?Advance Directive on??08/11/20.??Satisfied by Lyly Batres RN  ??? ? ? ?Aspirin Therapy for CVD Prevention on??04/14/20.??Satisfied by Lester GUEVARA, Cecily Sorto  ??? ? ? ?Blood Pressure Screening  on??12/08/20.??Satisfied by Debbie Connell  ??? ? ? ?Body Mass Index Check on??08/11/20.??Satisfied by Lyly Batres RN  ??? ? ? ?Depression Screening on??06/04/20.??Satisfied by Christopher Prakash LPN  ??? ? ? ?Diabetes Maintenance-Serum Creatinine on??07/28/20.??Satisfied by Jossie Calderón  ??? ? ? ?Diabetes Screening on??07/28/20.??Satisfied by Jossie Calderón  ??? ? ? ?Fall Risk Assessment on??07/28/20.??Satisfied by Rachel Mathew  ??? ? ? ?Functional Assessment on??11/10/20.??Satisfied by Lottie Gonzalez RN  ??? ? ? ?Hypertension Management-Blood Pressure on??12/08/20.??Satisfied by Debbie Connell  ??? ? ? ?Influenza Vaccine on??11/10/20.??Satisfied by Lottie Gonzalez RN  ??? ? ? ?Obesity Screening on??08/11/20.??Satisfied by Lyly Batres RN  ?

## 2022-05-03 NOTE — HISTORICAL [facility]
This is a historical note converted from Lisa. Formatting and pictures may have been removed.  Please reference Lisa for original formatting and attached multimedia. Chief Complaint  R hip wound approximately 1 1/2 mths  History of Present Illness  The patient returns today having undergone a Cellutome?graft 1 week ago to her right?hip wound.  Review of Systems  See past medical history.  Physical Exam  Vitals & Measurements  T:?37.6? ?C (Oral)? HR:?66(Peripheral)? RR:?18? BP:?134/79? SpO2:?99%?  HT:?155?cm? WT:?49.4?kg?  The patient has a granulating wound over the right greater trochanter.? There?has been further epithelialization along the wound periphery.? There is a?small amount of serosanguineous drainage.? There is no evidence of infection.  ?  Incision/Wounds  ?1. Hip Right Pressure ulcer?- last charted: 06/09/2020 15:38  ?? ??Assessment Done By?- Wound Care Team  ?? ??Pressure Point?- Bony prominence  ?? ??Dressing Type?- Moist saline gauze, Tape  ?? ??Dressing Assessment?- Drainage present, Intact  ?? ??Dressing Activity?- Removed  ?? ??Cleansing?- Cleaned with normal saline  ?? ??Length?- 1.8 cm  ?? ??Width?- 4.3 cm  ?? ??Depth?- 0.1 cm  ?? ??Wound Bed Tissue Type?- Epithelialized, Granulating, Hypergranular  ?? ??Percent Granulated?- 90 %  ?? ??Percent Epithelialized?- 10 %  ?? ??Pressure Ulcer Stage?- III  ?? ??Exudate Amount?- Moderate  ?? ??Exudate Type?- Serous  ?? ??Exudate Odor?- None  ?? ??Edge?- Well defined  ?? ??Surrounding Tissue Color?- Normal  ?? ??Surrounding Tissue Condition?- Intact  ?? ??Status?- Improving  ?  Assessment/Plan  1.?Diabetes mellitus type 2, controlled, without complications?E11.9  ?Continue current therapy.  2.?Decubitus ulcer of right hip, unstageable?L89.210  ?Continue?wound care?as recommended below.  3.?Eschar of wound bed?T14.8XXA  ?Resolved.  4.?Malignant neoplasm of head of pancreas?C25.0  ?Follow-up with GI and oncology.  Orders:  Wound Care Outpatient, *Est.  06/16/20 3:00:00 CDT, *Est. Stop date 06/16/20 3:00:00 CDT, [facility], FU with Physician in 1 week  Wound Care Team Treatment, 06/09/20 16:10:00 CDT, Stop date 06/09/20 16:10:00 CDT, Apply collagen matrix dressing to wound with a foam overlay. Change daily. Follow-up with GI and oncology.  Referrals  Infusion Visit, 06/11/20 12:30:00 CDT, Order for future visit  Lab Draw, 06/04/20 10:30:00 CDT  Lab Draw, *Est. 06/18/20 12:30:00 CDT, Order for future visit, Malignant neoplasm of head of pancreas  Decubitus ulcer of right hip, unstageable, [facility]  Lab Draw, 06/11/20 12:15:00 CDT, Order for future visit  Lab Draw, 06/04/20 8:00:00 CDT, Day 8, Pancreatic cancer  Agranulocytosis secondary to cancer chemotherapy, [facility]  Nutrition Request (in clinic), 06/04/20 10:45:00 CDT, [facility]  Referral to Gastroenterology, Elevated LFTs and bilirubin, Referred to Provider Dr. Youssef, Patient known to him., Malignant neoplasm of head of pancreas  Decubitus ulcer of right hip, unstageable  Agranulocytosis secondary to cancer chemotherapy  Anemia  Treatment/Doctor Visit, 06/04/20 11:00:00 CDT  Treatment/Doctor Visit, *Est. 06/18/20 13:00:00 CDT, Order for future visit, Malignant neoplasm of head of pancreas  Decubitus ulcer of right hip, unstageable, [facility]  Treatment/Doctor Visit, 05/21/20 14:30:00 CDT, Pancreatic cancer  Agranulocytosis secondary to cancer chemotherapy, [facility]   Problem List/Past Medical History  Ongoing  Decubitus ulcer of right hip, unstageable  Diabetes mellitus type 2, controlled, without complications  Eschar of wound bed  High blood pressure  Hypercholesterolemia  Hypothyroidism(  Confirmed  )  Malignant cachexia  Malignant neoplasm of head of pancreas  Pressure ulcer of hip  Vitiligo  Historical  No qualifying data  Procedure/Surgical History  Dilation of Common Bile Duct with Intraluminal Device, Via Natural or Artificial Opening  Endoscopic (04/15/2020)  Endoscopic Retrograde Cholangiopancreatography (04/15/2020)  Extirpation of Matter from Common Bile Duct, Via Natural or Artificial Opening Endoscopic (04/15/2020)  Pancreatic Stent Placement (04/15/2020)  Catheter Insertion [facility] (.) (03/12/2020)  Fluoroscopy of Superior Vena Cava using Low Osmolar Contrast, Guidance (03/12/2020)  Insertion of Infusion Device into Superior Vena Cava, Percutaneous Approach (03/12/2020)  Insertion of Totally Implantable Vascular Access Device into Chest Subcutaneous Tissue and Fascia, Open Approach (03/12/2020)  Insertion of tunneled centrally inserted central venous access device, with subcutaneous port; age 5 years or older (03/12/2020)  Biliary Stent (Upper) (02/20/2020)  Dilation of Common Bile Duct with Intraluminal Device, Via Natural or Artificial Opening Endoscopic (02/20/2020)  EGD w/ Fine Needle Biopsy / Aspiration (Upper) (02/20/2020)  Endoscopic retrograde cholangiopancreatography (ERCP); with placement of endoscopic stent into biliary or pancreatic duct, including pre- and post-dilation and guide wire passage, when performed, including sphincterotomy, when performed, each stent (02/20/2020)  Endoscopic Retrograde Cholangiopancreatography (Upper) (02/20/2020)  Endoscopic Ultrasonography (Upper) (02/20/2020)  Biopsy Gastrointestinal (02/13/2020)  Endoscopic Retrograde Cholangiopancreatography (02/13/2020)  Esophagogastroduodenoscopy (02/13/2020)  Esophagogastroduodenoscopy, flexible, transoral; with biopsy, single or multiple (02/13/2020)  Excision of Duodenum, Via Natural or Artificial Opening Endoscopic, Diagnostic (02/13/2020)  bladder sx (2015)  Thyroidectomy (1970)  left arm surgery to remove tumor   Medications  amLODIPine 5 mg oral tablet, 0.5 tab, Oral, Daily  glipiZIDE 10 mg oral tablet, extended release (XL tab), 10 mg= 1 tab(s), Oral, BID  levothyroxine 125 mcg (0.125 mg) oral tablet, 125 mcg= 1 tab(s), Oral, qAM  megestrol 40 mg/mL  oral suspension, 800 mg= 20 mL, Oral, Daily, 6 refills  promethazine 25 mg oral tablet, 25 mg= 1 tab(s), Oral, q6hr  simvastatin 20 mg oral tablet, 20 mg= 1 tab(s), Oral, qPM  Zofran 8 mg oral tablet, 8 mg= 1 tab(s), Oral, q8hr, PRN,? ?Investigating: Not listed as active med at Dr. Lew Rey office and last filled on 3/11/20 (10 day supply) at [facility]  Allergies  No Known Allergies  No Known Medication Allergies  Social History  Abuse/Neglect  No, 06/04/2020  No, 05/12/2020  No, 04/14/2020  No, 04/09/2020  No, 04/01/2020  No, 03/26/2020  Alcohol  Never, 03/05/2020  Employment/School  Unemployed, 03/05/2020  Exercise  Nutrition/Health  Regular, 03/05/2020  Substance Use  Never, 03/05/2020  Tobacco  Never (less than 100 in lifetime), N/A, 06/04/2020  Never (less than 100 in lifetime), N/A, 05/12/2020  Never (less than 100 in lifetime), N/A, 04/09/2020  Never (less than 100 in lifetime), N/A, 04/01/2020  Family History  Diabetes mellitus type 2: Mother.  Hypertension.: Mother.  Malignant neoplasm of cervix: Mother.  Health Maintenance  Health Maintenance  ???Pending?(in the next year)  ??? ??OverDue  ??? ? ? ?Smoking Cessation (Diabetes) due??10/08/17??and every 2??year(s)  ??? ? ? ?Alcohol Misuse Screening due??01/01/20??and every 1??year(s)  ??? ??Due?  ??? ? ? ?Bone Density Screening due??06/09/20??Variable frequency  ??? ? ? ?Breast Cancer Screening (Senior Wellness) due??06/09/20??and every?  ??? ? ? ?Colorectal Screening (Senior Wellness) due??06/09/20??and every?  ??? ? ? ?Diabetes Maintenance-Eye Exam due??06/09/20??and every?  ??? ? ? ?Diabetes Maintenance-Foot Exam due??06/09/20??and every?  ??? ? ? ?Medicare Annual Wellness Exam due??06/09/20??and every 1??year(s)  ??? ? ? ?Pneumococcal Vaccine due??06/09/20??Variable frequency  ??? ? ? ?Pneumococcal Vaccine due??06/09/20??and every?  ??? ? ? ?Tetanus Vaccine due??06/09/20??and every 10??year(s)  ??? ? ? ?Zoster Vaccine  due??06/09/20??and every 100??year(s)  ??? ??Due In Future?  ??? ? ? ?Diabetes Maintenance-HgbA1c not due until??06/20/20??and every 1??year(s)  ??? ? ? ?Diabetes Maintenance-Fasting Lipid Profile not due until??10/10/20??and every 1??year(s)  ??? ? ? ?Advance Directive not due until??01/01/21??and every 1??year(s)  ??? ? ? ?Cognitive Screening not due until??01/01/21??and every 1??year(s)  ??? ? ? ?Fall Risk Assessment not due until??01/01/21??and every 1??year(s)  ??? ? ? ?Functional Assessment not due until??01/01/21??and every 1??year(s)  ??? ? ? ?Obesity Screening not due until??01/01/21??and every 1??year(s)  ??? ? ? ?Aspirin Therapy for CVD Prevention not due until??04/14/21??and every 1??year(s)  ??? ? ? ?ADL Screening not due until??04/15/21??and every 1??year(s)  ??? ? ? ?Hypertension Management-Blood Pressure not due until??06/04/21??and every 1??year(s)  ??? ? ? ?Hypertension Management-BMP not due until??06/04/21??and every 1??year(s)  ???Satisfied?(in the past 1 year)  ??? ??Satisfied?  ??? ? ? ?ADL Screening on??04/15/20.??Satisfied by Helga Callejas RN  ??? ? ? ?Advance Directive on??03/27/20.??Satisfied by Lyly Batres RN  ??? ? ? ?Aspirin Therapy for CVD Prevention on??04/14/20.??Satisfied by Lester GUEVARA, Cecily Sorto  ??? ? ? ?Blood Pressure Screening on??06/09/20.??Satisfied by [facility] GUEVARALottie  ??? ? ? ?Body Mass Index Check on??06/04/20.??Satisfied by Christopher Prakash LPN  ??? ? ? ?Depression Screening on??06/04/20.??Satisfied by Christopher Prakash LPN  ??? ? ? ?Diabetes Screening on??06/04/20.??Satisfied by Juve Rose  ??? ? ? ?Fall Risk Assessment on??06/04/20.??Satisfied by Christopher Prakash LPN  ??? ? ? ?Functional Assessment on??05/12/20.??Satisfied by Lottie Gonzalez RN  ??? ? ? ?Hypertension Management-Blood Pressure on??06/09/20.??Satisfied by Lottie Gonzalez RN  ??? ? ? ?Obesity Screening on??06/04/20.??Satisfied by Christopher Prakash LPN  ?

## 2022-05-03 NOTE — HISTORICAL [facility]
This is a historical note converted from Lisa. Formatting and pictures may have been removed.  Please reference Lisa for original formatting and attached multimedia. Chief Complaint  R hip wound approximately 1 1/2 mths  Physical Exam  Vitals & Measurements  T:?37.3? ?C (Oral)? HR:?69(Peripheral)? RR:?18? BP:?163/73? SpO2:?100%?  HT:?155?cm? WT:?49.4?kg?  Assessment/Plan  1.?Diabetes mellitus type 2, controlled, without complications?E11.9  ?Incision/Wounds  ?1. Hip Right Pressure ulcer?- last charted: 06/30/2020 13:13  ?? ??Assessment Done By?- Wound Care Team  ?? ??Pressure Point?- Bony prominence  ?? ??Dressing Type?- Collagen, Foam  ?? ??Dressing Assessment?- Drainage present, Intact  ?? ??Dressing Activity?- Changed  ?? ??Cleansing?- Cleaned with normal saline  ?? ??Length?- 1.1 cm  ?? ??Width?- 3.7 cm  ?? ??Depth?- 0.1 cm  ?? ??Wound Bed Tissue Type?- Granulating, Hypergranular  ?? ??Percent Granulated?- 100 %  ?? ??Pressure Ulcer Stage?- III  ?? ??Exudate Amount?- Small  ?? ??Exudate Type?- Serous  ?? ??Exudate Odor?- None  ?? ??Edge?- Well defined  ?? ??Surrounding Tissue Color?- Erythema  ?? ??Surrounding Tissue Condition?- Intact  ?? ??Status?- Improving  ?  ?2. Buttock Right Pressure ulcer?- last charted: 06/30/2020 13:13  ?? ??Assessment Done By?- Wound Care Team  ?? ??Dressing Type?- Collagen, Foam  ?? ??Dressing Activity?- Applied  ?? ??Cleansing?- Cleaned with normal saline  ?? ??Length?- 0.5 cm  ?? ??Width?- 0.8 cm  ?? ??Depth?- 0.1 cm  ?? ??Wound Bed Tissue Type?- Pale Pink  ?? ??Pressure Ulcer Stage?- II  ?? ??Exudate Amount?- Small  ?? ??Exudate Type?- Serous  ?? ??Exudate Odor?- None  ?? ??Edge?- Poorly defined  ?? ??Surrounding Tissue Color?- Erythema  ?? ??Surrounding Tissue Condition?- Excoriated  Presents for follow-up care of pressure ulcers of the?right hip area and the right buttock area as described above?wounds are clean free of signs of infection with robust granulation tissue we  will continue collagen matrix dressing protocol on these wounds and follow-up in 1 week  2.?Decubitus ulcer of right hip, unstageable?L89.210  3.?Eschar of wound bed?T14.8XXA  4.?Malignant neoplasm of head of pancreas?C25.0  5.?Pressure ulcer of right buttock, stage 2?L89.312  Orders:  Wound Care Outpatient, *Est. 07/07/20 13:00:00 CDT, *Est. Stop date 07/07/20 13:00:00 CDT, [facility], FU with Physician in 1 week  Wound Care Team Treatment, 06/30/20 13:18:00 CDT, Stop date 06/30/20 13:18:00 CDT, continue collagen matrix dressing monday wednesday and friday  Referrals  Hospital Follow Up, 07/02/20 11:00:00 CDT, Order for future visit  Infusion Visit, 06/11/20 12:00:00 CDT, Order for future visit  Lab Draw, 06/04/20 10:30:00 CDT  Lab Draw, 06/10/20 15:30:00 CDT  Lab Draw, 06/04/20 8:00:00 CDT, Day 8, Pancreatic cancer  Agranulocytosis secondary to cancer chemotherapy, [facility]  Nutrition Request (in clinic), 06/04/20 10:45:00 CDT, [facility]  Referral to Gastroenterology, Elevated LFTs and bilirubin, Referred to Provider Dr. Youssef, Patient known to him., Malignant neoplasm of head of pancreas  Decubitus ulcer of right hip, unstageable  Agranulocytosis secondary to cancer chemotherapy  Anemia  Treatment/Doctor Visit, 06/04/20 11:00:00 CDT  Treatment/Doctor Visit, 05/21/20 14:30:00 CDT, Pancreatic cancer  Agranulocytosis secondary to cancer chemotherapy, [facility]   Problem List/Past Medical History  Ongoing  Decubitus ulcer of right hip, unstageable  Diabetes mellitus type 2, controlled, without complications  Eschar of wound bed  High blood pressure  Hypercholesterolemia  Hypothyroidism(  Confirmed  )  Malignant cachexia  Malignant neoplasm of head of pancreas  Pressure ulcer of hip  Vitiligo  Historical  No qualifying data  Procedure/Surgical History  Dilation of Common Bile Duct with Intraluminal Device, Via Natural or Artificial Opening Endoscopic (04/15/2020)  Endoscopic  Retrograde Cholangiopancreatography (04/15/2020)  Extirpation of Matter from Common Bile Duct, Via Natural or Artificial Opening Endoscopic (04/15/2020)  Pancreatic Stent Placement (04/15/2020)  Catheter Insertion [facility] (.) (03/12/2020)  Fluoroscopy of Superior Vena Cava using Low Osmolar Contrast, Guidance (03/12/2020)  Insertion of Infusion Device into Superior Vena Cava, Percutaneous Approach (03/12/2020)  Insertion of Totally Implantable Vascular Access Device into Chest Subcutaneous Tissue and Fascia, Open Approach (03/12/2020)  Insertion of tunneled centrally inserted central venous access device, with subcutaneous port; age 5 years or older (03/12/2020)  Biliary Stent (Upper) (02/20/2020)  Dilation of Common Bile Duct with Intraluminal Device, Via Natural or Artificial Opening Endoscopic (02/20/2020)  EGD w/ Fine Needle Biopsy / Aspiration (Upper) (02/20/2020)  Endoscopic retrograde cholangiopancreatography (ERCP); with placement of endoscopic stent into biliary or pancreatic duct, including pre- and post-dilation and guide wire passage, when performed, including sphincterotomy, when performed, each stent (02/20/2020)  Endoscopic Retrograde Cholangiopancreatography (Upper) (02/20/2020)  Endoscopic Ultrasonography (Upper) (02/20/2020)  Biopsy Gastrointestinal (02/13/2020)  Endoscopic Retrograde Cholangiopancreatography (02/13/2020)  Esophagogastroduodenoscopy (02/13/2020)  Esophagogastroduodenoscopy, flexible, transoral; with biopsy, single or multiple (02/13/2020)  Excision of Duodenum, Via Natural or Artificial Opening Endoscopic, Diagnostic (02/13/2020)  bladder sx (2015)  Thyroidectomy (1970)  left arm surgery to remove tumor   Medications  amlodipine 5 mg oral tablet, 5 mg= 1 tab(s), Oral, BID  glipiZIDE 10 mg oral tablet, extended release (XL tab), 10 mg= 1 tab(s), Oral, BID  levothyroxine 150 mcg (0.15 mg) oral capsule, 150 mcg= 1 cap(s), Oral, Daily  megestrol 40 mg/mL oral suspension, 800 mg= 20  mL, Oral, Daily, 6 refills  promethazine 25 mg oral tablet, 25 mg= 1 tab(s), Oral, q6hr  Zofran 8 mg oral tablet, 8 mg= 1 tab(s), Oral, q8hr, PRN  Allergies  No Known Allergies  No Known Medication Allergies  Social History  Abuse/Neglect  No, 06/17/2020  No, No, Yes, 06/16/2020  No, 06/04/2020  No, 05/12/2020  No, 04/14/2020  No, 04/09/2020  No, 04/01/2020  No, 03/26/2020  Alcohol  Never, 03/05/2020  Employment/School  Unemployed, 03/05/2020  Exercise  Nutrition/Health  Regular, 03/05/2020  Substance Use  Never, 03/05/2020  Tobacco  Never (less than 100 in lifetime), N/A, 06/17/2020  Never (less than 100 in lifetime), No, 06/16/2020  Never (less than 100 in lifetime), N/A, 06/04/2020  Never (less than 100 in lifetime), N/A, 05/12/2020  Never (less than 100 in lifetime), N/A, 04/09/2020  Never (less than 100 in lifetime), N/A, 04/01/2020  Family History  Diabetes mellitus type 2: Mother.  Hypertension.: Mother.  Malignant neoplasm of cervix: Mother.  Health Maintenance  Health Maintenance  ???Pending?(in the next year)  ??? ??OverDue  ??? ? ? ?Smoking Cessation (Diabetes) due??10/08/17??and every 2??year(s)  ??? ? ? ?Alcohol Misuse Screening due??01/01/20??and every 1??year(s)  ??? ? ? ?Diabetes Maintenance-HgbA1c due??06/20/20??and every 1??year(s)  ??? ??Due?  ??? ? ? ?Bone Density Screening due??06/30/20??Variable frequency  ??? ? ? ?Breast Cancer Screening (Senior Wellness) due??06/30/20??and every?  ??? ? ? ?Colorectal Screening (Senior Wellness) due??06/30/20??and every?  ??? ? ? ?Diabetes Maintenance-Eye Exam due??06/30/20??and every?  ??? ? ? ?Diabetes Maintenance-Foot Exam due??06/30/20??and every?  ??? ? ? ?Medicare Annual Wellness Exam due??06/30/20??and every 1??year(s)  ??? ? ? ?Pneumococcal Vaccine due??06/30/20??Variable frequency  ??? ? ? ?Pneumococcal Vaccine due??06/30/20??and every?  ??? ? ? ?Tetanus Vaccine due??06/30/20??and every 10??year(s)  ??? ? ? ?Zoster Vaccine due??06/30/20??and every  100??year(s)  ??? ??Due In Future?  ??? ? ? ?Diabetes Maintenance-Fasting Lipid Profile not due until??10/10/20??and every 1??year(s)  ??? ? ? ?Advance Directive not due until??01/01/21??and every 1??year(s)  ??? ? ? ?Cognitive Screening not due until??01/01/21??and every 1??year(s)  ??? ? ? ?Fall Risk Assessment not due until??01/01/21??and every 1??year(s)  ??? ? ? ?Functional Assessment not due until??01/01/21??and every 1??year(s)  ??? ? ? ?Obesity Screening not due until??01/01/21??and every 1??year(s)  ??? ? ? ?Aspirin Therapy for CVD Prevention not due until??04/14/21??and every 1??year(s)  ??? ? ? ?ADL Screening not due until??04/15/21??and every 1??year(s)  ??? ? ? ?Hypertension Management-Blood Pressure not due until??06/09/21??and every 1??year(s)  ??? ? ? ?Hypertension Management-BMP not due until??06/20/21??and every 1??year(s)  ???Satisfied?(in the past 1 year)  ??? ??Satisfied?  ??? ? ? ?ADL Screening on??04/15/20.??Satisfied by Helga Callejas RN  ??? ? ? ?Advance Directive on??03/27/20.??Satisfied by Lyly Batres RN  ??? ? ? ?Aspirin Therapy for CVD Prevention on??04/14/20.??Satisfied by Cecily Batista RN  ??? ? ? ?Blood Pressure Screening on??06/30/20.??Satisfied by [facility] GUEVARA, Lottie Rivera  ??? ? ? ?Body Mass Index Check on??06/17/20.??Satisfied by Alexa GUEVARA, Flavia MUHAMMAD  ??? ? ? ?Depression Screening on??06/04/20.??Satisfied by Christopher Prakash LPN  ??? ? ? ?Diabetes Screening on??06/20/20.??Satisfied by Yi Kiser MT  ??? ? ? ?Fall Risk Assessment on??06/04/20.??Satisfied by Christopher Prakash LPN.  ??? ? ? ?Functional Assessment on??06/18/20.??Satisfied by Emily Mancilla  ??? ? ? ?Hypertension Management-Blood Pressure on??06/30/20.??Satisfied by [facility] GUEVARA, Lottie Rivera  ??? ? ? ?Obesity Screening on??06/17/20.??Satisfied by Alexa GUEVARA, Flavia MUHAMMAD  ?

## 2022-05-03 NOTE — HISTORICAL [facility]ERNER
This is a historical note converted from Lisa. Formatting and pictures may have been removed.  Please reference Lisa for original formatting and attached multimedia. Chief Complaint  R hip wound approximately 1 1/2 mths  History of Present Illness  Patient returns today for continued management of her?right greater trochanter?decubitus ulcer. ?She is currently using send heel to assist with debridement of the eschar.? Her chemotherapy is on hold?due to her?wound.  Review of Systems  Not significantly different than the history of present and past medical illnesses.  Physical Exam  Vitals & Measurements  T:?37.0? ?C (Oral)? HR:?59(Peripheral)? RR:?18? BP:?148/69? SpO2:?99%?  HT:?155?cm? WT:?49.4?kg?  The patient has persistent?thin, yellow eschar?representing an approximately?30% of the?wound. ?The remainder of the wound has excellent granulation tissue?and a small amount of serosanguineous drainage. ?The periwound area is clean.  ?  Incision/Wounds  ?1. Hip Right Pressure ulcer?- last charted: 05/19/2020 14:01  ?? ??Assessment Done By?- Wound Care Team  ?? ??Pressure Point?- Bony prominence  ?? ??Dressing Type?- Absorptive, Medicated packing strips  ?? ??Dressing Assessment?- Drainage present, Intact  ?? ??Dressing Activity?- Removed  ?? ??Cleansing?- Cleaned with normal saline  ?? ??Length?- 2.5 cm  ?? ??Width?- 5.3 cm  ?? ??Depth?- 0.3 cm  ?? ??Wound Bed Tissue Type?- Erythema, Granulating, Necrotic tissue, slough, Pale Pink  ?? ??Percent Granulated?- 40 %  ?? ??Percent Necrotic Tissue Slough?- 60 %  ?? ??Pressure Ulcer Stage?- III  ?? ??Exudate Amount?- Moderate  ?? ??Exudate Type?- Serous  ?? ??Exudate Odor?- None  ?? ??Edge?- Well defined  ?? ??Surrounding Tissue Color?- Normal  ?? ??Surrounding Tissue Condition?- Intact  ?  Assessment/Plan  1.?Diabetes mellitus type 2, controlled, without complications?E11.9  2.?Decubitus ulcer of right hip, unstageable?L89.210  3.?Eschar of wound  bed?T14.8XXA  ?Continue?current therapy?using?Santyl for debridement.  4.?Malignant neoplasm of head of pancreas?C25.0  ?Follow-up with oncology.  Referrals  Lab Draw, 06/04/20 10:30:00 CDT, Order for future visit  Lab Draw, 06/04/20 8:00:00 CDT, Day 8, Pancreatic cancer  Agranulocytosis secondary to cancer chemotherapy, [facility]  Nutrition Request (in clinic), 06/04/20 10:45:00 CDT, Order for future visit, [facility]  Treatment/Doctor Visit, 06/04/20 11:00:00 CDT, Order for future visit  Treatment/Doctor Visit, 05/21/20 14:30:00 CDT, Pancreatic cancer  Agranulocytosis secondary to cancer chemotherapy, [facility]   Problem List/Past Medical History  Ongoing  Decubitus ulcer of right hip, unstageable  Diabetes mellitus type 2, controlled, without complications  Eschar of wound bed  High blood pressure  Hypercholesterolemia  Hypothyroidism(  Confirmed  )  Malignant cachexia  Malignant neoplasm of head of pancreas  Pressure ulcer of hip  Vitiligo  Historical  No qualifying data  Procedure/Surgical History  Dilation of Common Bile Duct with Intraluminal Device, Via Natural or Artificial Opening Endoscopic (04/15/2020)  Endoscopic Retrograde Cholangiopancreatography (04/15/2020)  Extirpation of Matter from Common Bile Duct, Via Natural or Artificial Opening Endoscopic (04/15/2020)  Pancreatic Stent Placement (04/15/2020)  Catheter Insertion [facility] ([facility] (03/12/2020)  Fluoroscopy of Superior Vena Cava using Low Osmolar Contrast, Guidance (03/12/2020)  Insertion of Infusion Device into Superior Vena Cava, Percutaneous Approach (03/12/2020)  Insertion of Totally Implantable Vascular Access Device into Chest Subcutaneous Tissue and Fascia, Open Approach (03/12/2020)  Insertion of tunneled centrally inserted central venous access device, with subcutaneous port; age 5 years or older (03/12/2020)  Biliary Stent (Upper) (02/20/2020)  Dilation of Common Bile Duct with Intraluminal Device, Via Natural or  Artificial Opening Endoscopic (02/20/2020)  EGD w/ Fine Needle Biopsy / Aspiration (Upper) (02/20/2020)  Endoscopic retrograde cholangiopancreatography (ERCP); with placement of endoscopic stent into biliary or pancreatic duct, including pre- and post-dilation and guide wire passage, when performed, including sphincterotomy, when performed, each stent (02/20/2020)  Endoscopic Retrograde Cholangiopancreatography (Upper) (02/20/2020)  Endoscopic Ultrasonography (Upper) (02/20/2020)  Biopsy Gastrointestinal (02/13/2020)  Endoscopic Retrograde Cholangiopancreatography (02/13/2020)  Esophagogastroduodenoscopy (02/13/2020)  Esophagogastroduodenoscopy, flexible, transoral; with biopsy, single or multiple (02/13/2020)  Excision of Duodenum, Via Natural or Artificial Opening Endoscopic, Diagnostic (02/13/2020)  bladder sx (2015)  Thyroidectomy (1970)  left arm surgery to remove tumor   Medications  amLODIPine 5 mg oral tablet, 0.5 tab, Oral, Daily  glipiZIDE 10 mg oral tablet, extended release (XL tab), 10 mg= 1 tab(s), Oral, BID  levothyroxine 125 mcg (0.125 mg) oral tablet, 125 mcg= 1 tab(s), Oral, qAM  promethazine 25 mg oral tablet, 25 mg= 1 tab(s), Oral, q6hr  simvastatin 20 mg oral tablet, 20 mg= 1 tab(s), Oral, qPM  Zofran 8 mg oral tablet, 8 mg= 1 tab(s), Oral, q8hr, PRN,? ?Investigating: Not listed as active med at Dr. Lew Rey office and last filled on 3/11/20 (10 day supply) at [facility]  Allergies  No Known Allergies  No Known Medication Allergies  Social History  Abuse/Neglect  No, 05/21/2020  No, 05/12/2020  No, 04/14/2020  No, 04/09/2020  No, 04/01/2020  No, 03/26/2020  Alcohol  Never, 03/05/2020  Employment/School  Unemployed, 03/05/2020  Exercise  Nutrition/Health  Regular, 03/05/2020  Substance Use  Never, 03/05/2020  Tobacco  Never (less than 100 in lifetime), N/A, 05/21/2020  Never (less than 100 in lifetime), N/A, 05/12/2020  Never (less than 100 in lifetime), N/A,  04/09/2020  Never (less than 100 in lifetime), N/A, 04/01/2020  Family History  Diabetes mellitus type 2: Mother.  Hypertension.: Mother.  Malignant neoplasm of cervix: Mother.  Health Maintenance  Health Maintenance  ???Pending?(in the next year)  ??? ??OverDue  ??? ? ? ?Smoking Cessation (Diabetes) due??10/08/17??and every 2??year(s)  ??? ? ? ?Alcohol Misuse Screening due??01/01/20??and every 1??year(s)  ??? ??Due?  ??? ? ? ?Bone Density Screening due??05/26/20??Variable frequency  ??? ? ? ?Breast Cancer Screening (Senior Wellness) due??05/26/20??and every?  ??? ? ? ?Colorectal Screening (Senior Wellness) due??05/26/20??and every?  ??? ? ? ?Diabetes Maintenance-Eye Exam due??05/26/20??and every?  ??? ? ? ?Diabetes Maintenance-Foot Exam due??05/26/20??and every?  ??? ? ? ?Medicare Annual Wellness Exam due??05/26/20??and every 1??year(s)  ??? ? ? ?Pneumococcal Vaccine due??05/26/20??Variable frequency  ??? ? ? ?Pneumococcal Vaccine due??05/26/20??and every?  ??? ? ? ?Tetanus Vaccine due??05/26/20??and every 10??year(s)  ??? ? ? ?Zoster Vaccine due??05/26/20??and every 100??year(s)  ??? ??Due In Future?  ??? ? ? ?Diabetes Maintenance-HgbA1c not due until??06/20/20??and every 1??year(s)  ??? ? ? ?Diabetes Maintenance-Fasting Lipid Profile not due until??10/10/20??and every 1??year(s)  ??? ? ? ?Advance Directive not due until??01/01/21??and every 1??year(s)  ??? ? ? ?Cognitive Screening not due until??01/01/21??and every 1??year(s)  ??? ? ? ?Fall Risk Assessment not due until??01/01/21??and every 1??year(s)  ??? ? ? ?Functional Assessment not due until??01/01/21??and every 1??year(s)  ??? ? ? ?Obesity Screening not due until??01/01/21??and every 1??year(s)  ??? ? ? ?Aspirin Therapy for CVD Prevention not due until??04/14/21??and every 1??year(s)  ??? ? ? ?ADL Screening not due until??04/15/21??and every 1??year(s)  ??? ? ? ?Hypertension Management-Blood Pressure not due until??05/21/21??and every 1??year(s)  ??? ? ?  ?Hypertension Management-BMP not due until??05/21/21??and every 1??year(s)  ???Satisfied?(in the past 1 year)  ??? ??Satisfied?  ??? ? ? ?ADL Screening on??04/15/20.??Satisfied by Júnior GUEVARA, Helga Guo  ??? ? ? ?Advance Directive on??03/27/20.??Satisfied by Lyly Batres RN  ??? ? ? ?Aspirin Therapy for CVD Prevention on??04/14/20.??Satisfied by Cecily Batista RN  ??? ? ? ?Blood Pressure Screening on??05/26/20.??Satisfied by Lottie Gonzalez RN  ??? ? ? ?Body Mass Index Check on??05/21/20.??Satisfied by Christopher Prakash LPN  ??? ? ? ?Depression Screening on??03/18/20.??Satisfied by Saranya Smith  ??? ? ? ?Diabetes Screening on??05/07/20.??Satisfied by Luh Sultana  ??? ? ? ?Fall Risk Assessment on??04/30/20.??Satisfied by Dressler RN, Paul  ??? ? ? ?Functional Assessment on??05/12/20.??Satisfied by Lottie Gonzalez RN  ??? ? ? ?Hypertension Management-Blood Pressure on??05/26/20.??Satisfied by Lottie Gonzalez RN  ??? ? ? ?Obesity Screening on??05/21/20.??Satisfied by Christopher Prakash LPN  ?